# Patient Record
Sex: FEMALE | Race: ASIAN | NOT HISPANIC OR LATINO | Employment: FULL TIME | URBAN - METROPOLITAN AREA
[De-identification: names, ages, dates, MRNs, and addresses within clinical notes are randomized per-mention and may not be internally consistent; named-entity substitution may affect disease eponyms.]

---

## 2017-08-19 ENCOUNTER — HOSPITAL ENCOUNTER (EMERGENCY)
Facility: HOSPITAL | Age: 42
Discharge: HOME | End: 2017-08-19
Attending: EMERGENCY MEDICINE
Payer: COMMERCIAL

## 2017-08-19 VITALS
DIASTOLIC BLOOD PRESSURE: 66 MMHG | HEIGHT: 62 IN | BODY MASS INDEX: 19.32 KG/M2 | HEART RATE: 88 BPM | TEMPERATURE: 97 F | RESPIRATION RATE: 15 BRPM | WEIGHT: 105 LBS | OXYGEN SATURATION: 99 % | SYSTOLIC BLOOD PRESSURE: 106 MMHG

## 2017-08-19 DIAGNOSIS — R45.1 AGITATION: ICD-10-CM

## 2017-08-19 DIAGNOSIS — F10.929 ALCOHOL INTOXICATION, WITH UNSPECIFIED COMPLICATION: Primary | ICD-10-CM

## 2017-08-19 PROCEDURE — 99283 EMERGENCY DEPT VISIT LOW MDM: CPT

## 2017-08-19 RX ORDER — ROSUVASTATIN CALCIUM 10 MG/1
10 TABLET, COATED ORAL DAILY
Status: ON HOLD | COMMUNITY

## 2017-08-20 NOTE — ED PROVIDER NOTES
"Encounter Date: 8/19/2017       History     Chief Complaint   Patient presents with    Fall     EMS reports that was sitting on a bar stool and fell off hitting face on floor. has hematoma to forehead and cut to nose. pt currently has c-collar applied and on spine board      Patient is a 42-year-old female brought in by [place] [facility] after she reportedly fell off of a bar stool striking her face on the floor.  She had a brief loss of consciousness according to bystanders.  She admits to EtOH consumption tonight.  She presents to the ED belligerent and cursing at staff, refusing to give any further pertinent information.      The history is provided by the patient and the EMS personnel.     Review of patient's allergies indicates:  No Known Allergies  No past medical history on file.  No past surgical history on file.  No family history on file.  Social History   Substance Use Topics    Smoking status: Not on file    Smokeless tobacco: Not on file    Alcohol use Not on file     Review of Systems   Unable to perform ROS: Other (Patient refuses to give me any information.)       Physical Exam     Initial Vitals [08/19/17 2151]   BP Pulse Resp Temp SpO2   106/66 88 15 96.9 °F (36.1 °C) 99 %      MAP       79.33         Physical Exam    Nursing note and vitals reviewed.  Constitutional:   PATIENT NOT COOPERATIVE WITH PHYSICAL EXAM.  (PATIENT SCREAMS "DO NOT TOUCH ME".)         ED Course   Procedures  Labs Reviewed - No data to display          Medical Decision Making:   ED Management:  42-year-old female brought in from [place] after she fell from a barstool fall drinking tonight.  Patient was belligerent with EMS personnel as well as the emergency department nurses and myself.  She would not let me examine her and refused all treatment here in the ED.  She called her  to pick her up and left the ED ambulatory in stable condition.                   ED Course     Clinical Impression:   The primary " encounter diagnosis was Alcohol intoxication, with unspecified complication. A diagnosis of Agitation was also pertinent to this visit.                           Christoph Landeros MD  08/20/17 0020

## 2017-08-20 NOTE — ED NOTES
Pt presents to ED via EMS. EMS reports that pt was drinking at a bar, fell off of a barstool and hit head. Pt had hematoma to left forehead and cut to nose. Dry blood noted to nose. Pt requests to leave. Pt is unable to steadily walk.  called to  pt. Pt refuses to give any further information or have a physical assessment complete.

## 2018-03-19 ENCOUNTER — HOSPITAL ENCOUNTER (EMERGENCY)
Facility: HOSPITAL | Age: 43
Discharge: PSYCHIATRIC OR SUBSTANCE USE FACILITY | End: 2018-03-20
Attending: EMERGENCY MEDICINE
Payer: COMMERCIAL

## 2018-03-19 DIAGNOSIS — Z00.8 MEDICAL CLEARANCE FOR PSYCHIATRIC ADMISSION: ICD-10-CM

## 2018-03-19 DIAGNOSIS — R07.81 RIB PAIN ON LEFT SIDE: ICD-10-CM

## 2018-03-19 PROBLEM — F10.20 ALCOHOL USE DISORDER, SEVERE, DEPENDENCE: Status: ACTIVE | Noted: 2018-03-19

## 2018-03-19 PROBLEM — F33.2 MAJOR DEPRESSIVE DISORDER, RECURRENT SEVERE WITHOUT PSYCHOTIC FEATURES: Status: ACTIVE | Noted: 2018-03-19

## 2018-03-19 LAB
ALBUMIN SERPL BCP-MCNC: 3.8 G/DL
ALP SERPL-CCNC: 76 U/L
ALT SERPL W/O P-5'-P-CCNC: 15 U/L
AMPHET+METHAMPHET UR QL: NEGATIVE
ANION GAP SERPL CALC-SCNC: 13 MMOL/L
AST SERPL-CCNC: 36 U/L
B-HCG UR QL: NEGATIVE
BACTERIA #/AREA URNS HPF: NORMAL /HPF
BARBITURATES UR QL SCN>200 NG/ML: NEGATIVE
BASOPHILS # BLD AUTO: 0.02 K/UL
BASOPHILS NFR BLD: 0.4 %
BENZODIAZ UR QL SCN>200 NG/ML: NORMAL
BILIRUB SERPL-MCNC: 0.3 MG/DL
BILIRUB UR QL STRIP: NEGATIVE
BUN SERPL-MCNC: 13 MG/DL
BZE UR QL SCN: NEGATIVE
CALCIUM SERPL-MCNC: 9 MG/DL
CANNABINOIDS UR QL SCN: NEGATIVE
CHLORIDE SERPL-SCNC: 106 MMOL/L
CLARITY UR: CLEAR
CO2 SERPL-SCNC: 26 MMOL/L
COLOR UR: YELLOW
CREAT SERPL-MCNC: 0.7 MG/DL
CREAT UR-MCNC: 46.4 MG/DL
CTP QC/QA: YES
DIFFERENTIAL METHOD: ABNORMAL
EOSINOPHIL # BLD AUTO: 0 K/UL
EOSINOPHIL NFR BLD: 0.4 %
ERYTHROCYTE [DISTWIDTH] IN BLOOD BY AUTOMATED COUNT: 13.6 %
EST. GFR  (AFRICAN AMERICAN): >60 ML/MIN/1.73 M^2
EST. GFR  (NON AFRICAN AMERICAN): >60 ML/MIN/1.73 M^2
ETHANOL SERPL-MCNC: 120 MG/DL
ETHANOL SERPL-MCNC: 333 MG/DL
GLUCOSE SERPL-MCNC: 96 MG/DL
GLUCOSE UR QL STRIP: NEGATIVE
HCT VFR BLD AUTO: 39.3 %
HGB BLD-MCNC: 14 G/DL
HGB UR QL STRIP: ABNORMAL
KETONES UR QL STRIP: NEGATIVE
LEUKOCYTE ESTERASE UR QL STRIP: NEGATIVE
LYMPHOCYTES # BLD AUTO: 3 K/UL
LYMPHOCYTES NFR BLD: 53.3 %
MCH RBC QN AUTO: 34.1 PG
MCHC RBC AUTO-ENTMCNC: 35.6 G/DL
MCV RBC AUTO: 96 FL
METHADONE UR QL SCN>300 NG/ML: NEGATIVE
MICROSCOPIC COMMENT: NORMAL
MONOCYTES # BLD AUTO: 0.3 K/UL
MONOCYTES NFR BLD: 5.9 %
NEUTROPHILS # BLD AUTO: 2.3 K/UL
NEUTROPHILS NFR BLD: 40 %
NITRITE UR QL STRIP: NEGATIVE
OPIATES UR QL SCN: NEGATIVE
PCP UR QL SCN>25 NG/ML: NEGATIVE
PH UR STRIP: 7 [PH] (ref 5–8)
PLATELET # BLD AUTO: 225 K/UL
PMV BLD AUTO: 10 FL
POTASSIUM SERPL-SCNC: 4.1 MMOL/L
PROT SERPL-MCNC: 7.7 G/DL
PROT UR QL STRIP: NEGATIVE
RBC # BLD AUTO: 4.11 M/UL
RBC #/AREA URNS HPF: 1 /HPF (ref 0–4)
SODIUM SERPL-SCNC: 145 MMOL/L
SP GR UR STRIP: 1.01 (ref 1–1.03)
TOXICOLOGY INFORMATION: NORMAL
TSH SERPL DL<=0.005 MIU/L-ACNC: 0.54 UIU/ML
URN SPEC COLLECT METH UR: ABNORMAL
UROBILINOGEN UR STRIP-ACNC: NEGATIVE EU/DL
WBC # BLD AUTO: 5.61 K/UL

## 2018-03-19 PROCEDURE — 96376 TX/PRO/DX INJ SAME DRUG ADON: CPT

## 2018-03-19 PROCEDURE — 96374 THER/PROPH/DIAG INJ IV PUSH: CPT

## 2018-03-19 PROCEDURE — 81025 URINE PREGNANCY TEST: CPT | Performed by: EMERGENCY MEDICINE

## 2018-03-19 PROCEDURE — 90792 PSYCH DIAG EVAL W/MED SRVCS: CPT | Mod: ,,, | Performed by: PSYCHIATRY & NEUROLOGY

## 2018-03-19 PROCEDURE — 63600175 PHARM REV CODE 636 W HCPCS: Performed by: EMERGENCY MEDICINE

## 2018-03-19 PROCEDURE — 93005 ELECTROCARDIOGRAM TRACING: CPT

## 2018-03-19 PROCEDURE — 80307 DRUG TEST PRSMV CHEM ANLYZR: CPT

## 2018-03-19 PROCEDURE — 81000 URINALYSIS NONAUTO W/SCOPE: CPT | Mod: 59

## 2018-03-19 PROCEDURE — 96375 TX/PRO/DX INJ NEW DRUG ADDON: CPT

## 2018-03-19 PROCEDURE — 25000003 PHARM REV CODE 250: Performed by: EMERGENCY MEDICINE

## 2018-03-19 PROCEDURE — 99285 EMERGENCY DEPT VISIT HI MDM: CPT | Mod: 25

## 2018-03-19 PROCEDURE — 80320 DRUG SCREEN QUANTALCOHOLS: CPT

## 2018-03-19 PROCEDURE — 96361 HYDRATE IV INFUSION ADD-ON: CPT

## 2018-03-19 PROCEDURE — 84443 ASSAY THYROID STIM HORMONE: CPT

## 2018-03-19 PROCEDURE — 82962 GLUCOSE BLOOD TEST: CPT

## 2018-03-19 PROCEDURE — 93010 ELECTROCARDIOGRAM REPORT: CPT | Mod: ,,, | Performed by: INTERNAL MEDICINE

## 2018-03-19 PROCEDURE — 85025 COMPLETE CBC W/AUTO DIFF WBC: CPT

## 2018-03-19 PROCEDURE — 80053 COMPREHEN METABOLIC PANEL: CPT

## 2018-03-19 RX ORDER — CITALOPRAM 20 MG/1
40 TABLET, FILM COATED ORAL DAILY
Status: DISCONTINUED | OUTPATIENT
Start: 2018-03-19 | End: 2018-03-20 | Stop reason: HOSPADM

## 2018-03-19 RX ORDER — ROSUVASTATIN CALCIUM 10 MG/1
10 TABLET, COATED ORAL NIGHTLY
Status: DISCONTINUED | OUTPATIENT
Start: 2018-03-19 | End: 2018-03-20 | Stop reason: HOSPADM

## 2018-03-19 RX ORDER — ONDANSETRON 2 MG/ML
8 INJECTION INTRAMUSCULAR; INTRAVENOUS
Status: COMPLETED | OUTPATIENT
Start: 2018-03-19 | End: 2018-03-19

## 2018-03-19 RX ORDER — DIAZEPAM 5 MG/1
10 TABLET ORAL
Status: COMPLETED | OUTPATIENT
Start: 2018-03-19 | End: 2018-03-19

## 2018-03-19 RX ORDER — KETOROLAC TROMETHAMINE 30 MG/ML
10 INJECTION, SOLUTION INTRAMUSCULAR; INTRAVENOUS
Status: COMPLETED | OUTPATIENT
Start: 2018-03-19 | End: 2018-03-19

## 2018-03-19 RX ORDER — LORAZEPAM 2 MG/ML
2 INJECTION INTRAMUSCULAR EVERY 4 HOURS PRN
Status: DISCONTINUED | OUTPATIENT
Start: 2018-03-19 | End: 2018-03-20 | Stop reason: HOSPADM

## 2018-03-19 RX ORDER — DIAZEPAM 10 MG/1
10 TABLET ORAL
COMMUNITY
End: 2018-05-17

## 2018-03-19 RX ORDER — MORPHINE SULFATE 4 MG/ML
4 INJECTION, SOLUTION INTRAMUSCULAR; INTRAVENOUS
Status: COMPLETED | OUTPATIENT
Start: 2018-03-19 | End: 2018-03-19

## 2018-03-19 RX ORDER — CITALOPRAM 40 MG/1
40 TABLET, FILM COATED ORAL DAILY
COMMUNITY
End: 2018-05-01

## 2018-03-19 RX ADMIN — KETOROLAC TROMETHAMINE 10 MG: 30 INJECTION, SOLUTION INTRAMUSCULAR at 09:03

## 2018-03-19 RX ADMIN — ROSUVASTATIN CALCIUM 10 MG: 10 TABLET, FILM COATED ORAL at 09:03

## 2018-03-19 RX ADMIN — DIAZEPAM 10 MG: 5 TABLET ORAL at 07:03

## 2018-03-19 RX ADMIN — CITALOPRAM HYDROBROMIDE 40 MG: 20 TABLET ORAL at 09:03

## 2018-03-19 RX ADMIN — ONDANSETRON HYDROCHLORIDE 8 MG: 2 INJECTION INTRAMUSCULAR; INTRAVENOUS at 10:03

## 2018-03-19 RX ADMIN — SODIUM CHLORIDE 1000 ML: 0.9 INJECTION, SOLUTION INTRAVENOUS at 09:03

## 2018-03-19 RX ADMIN — LORAZEPAM 2 MG: 2 INJECTION INTRAMUSCULAR; INTRAVENOUS at 07:03

## 2018-03-19 RX ADMIN — SODIUM CHLORIDE 1000 ML: 0.9 INJECTION, SOLUTION INTRAVENOUS at 07:03

## 2018-03-19 RX ADMIN — MORPHINE SULFATE 4 MG: 4 INJECTION INTRAVENOUS at 10:03

## 2018-03-19 NOTE — ASSESSMENT & PLAN NOTE
Patient has a severe alcohol problem with some insight.  A lot of time is spent with education and counseling to help patient but she is currently intoxicated.  Monitor for alcohol withdrawals and utilize lorazepam PRN.  No history of withdrawals.  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  She has a poor diet because of drinking.  May also benefit from replenishing MVI, thiamine, folic acid.

## 2018-03-19 NOTE — ED PROVIDER NOTES
"Encounter Date: 3/19/2018       History     Chief Complaint   Patient presents with    Psychiatric Evaluation     via ems pt intoxicated admitted to drinking "whole bottle of fireball" , Pt's  called ems because she was threatening to kill herself, had not been taking her meds for depression     42-year-old female with a history of hyperlipidemia as well as depression presents the emergency department by EMS.  Patient states that she has been drinking excessively since the death of her mother last year.  Her  called EMS as the patient was threatening to kill herself with alcohol intoxication.  Patient states she feels "like no one cares about me".  She denies past suicide attempts.  She denies homicidal ideation.  She states that she does not feel unsafe at home.  She admits to drinking over a bottle of Fireball every day.  She has been missing work frequently.  She is unsure when she last shower or ate a meal.          Review of patient's allergies indicates:  No Known Allergies  No past medical history on file.  No past surgical history on file.  No family history on file.  Social History   Substance Use Topics    Smoking status: Not on file    Smokeless tobacco: Not on file    Alcohol use Not on file     Review of Systems   Constitutional: Negative for activity change, appetite change, chills and fever.   HENT: Negative for congestion and drooling.    Eyes: Negative for pain.   Respiratory: Negative for cough, chest tightness, shortness of breath and stridor.    Cardiovascular: Negative for leg swelling.   Gastrointestinal: Negative for abdominal pain, nausea and vomiting.   Genitourinary: Negative for dysuria.   Musculoskeletal: Negative for neck pain and neck stiffness.   Skin: Negative for color change and wound.   Neurological: Negative for dizziness and weakness.   Hematological: Negative for adenopathy.   Psychiatric/Behavioral: Positive for behavioral problems, sleep disturbance and " suicidal ideas. Negative for agitation, confusion, decreased concentration, dysphoric mood, hallucinations and self-injury. The patient is not nervous/anxious.        Physical Exam     Initial Vitals [18 1420]   BP Pulse Resp Temp SpO2   114/62 72 18 98.7 °F (37.1 °C) 98 %      MAP       79.33         Physical Exam    Nursing note and vitals reviewed.  Constitutional: She appears well-developed and well-nourished. She is not diaphoretic. No distress.   HENT:   Head: Normocephalic and atraumatic.   Right Ear: External ear normal.   Left Ear: External ear normal.   Nose: Nose normal.   Mouth/Throat: Oropharynx is clear and moist.   Eyes: Conjunctivae and EOM are normal. Pupils are equal, round, and reactive to light. Right eye exhibits no discharge. Left eye exhibits no discharge.   Neck: Normal range of motion. Neck supple. No tracheal deviation present.   Cardiovascular: Normal rate, regular rhythm, normal heart sounds and intact distal pulses. Exam reveals no friction rub.    No murmur heard.  Pulmonary/Chest: Breath sounds normal. No respiratory distress. She has no rales. She exhibits no tenderness.   Abdominal: Soft. Bowel sounds are normal. She exhibits no distension and no mass. There is no tenderness. There is no rebound.   Musculoskeletal: Normal range of motion. She exhibits no edema or tenderness.   Neurological: She is alert and oriented to person, place, and time. She has normal strength. No cranial nerve deficit.   Skin: Skin is warm and dry. Capillary refill takes less than 2 seconds.   Psychiatric:   Tearful.  Depressed.  Expresses passive suicidality         ED Course   Procedures  Labs Reviewed   CBC W/ AUTO DIFFERENTIAL   COMPREHENSIVE METABOLIC PANEL   TSH   URINALYSIS   ALCOHOL,MEDICAL (ETHANOL)   DRUG SCREEN PANEL, URINE EMERGENCY   POCT URINE PREGNANCY             Medical Decision Makin-year-old female with a history of depression as well as hyperlipidemia presents the emergency  department brought in by EMS secondary to depression and suicidal thoughts.  On exam she is afebrile with stable vital signs.  She is nontoxic appearing.  She does appear depressed, tearful and expresses passive suicidal thoughts.  She admits drinking alcohol daily for months to deal with the death of her mother.  At this time, the patient is suicidal as well as gravely disabled.  Will PEC and seek inpatient psychiatric mental health care.  Screening labs are pending at this time.  The patient is aware of the PEC agrees with the plan.     Dr. Caro, psychiatrist, at bedside and has evaluated the patient. Agrees with PEC. PRN benzos for agitation.     Patient and her  were counseled were instructed to contact [place] [employer] to report her substance abuse. As a treating physician, I am not under mandate to report the patient but have urged her to self-report.     Given home medications of valium, celexa, statin as well as IVF. CBC and CMP are unremarkable. Ethanol is elevated. Urine studies are pending. Once medically clear, will seek inpatient hospitalizaton. She is c/o pain to R upper arm where she had blood drawn. On exam there is mild erythema to R biceps without warmth, induration, fluctuance or drainage. Compartments are soft. There is no bleeding. I suspect this is related to the tourniquet that was recently placed and blood drawn.    No signs of alcohol withdrawal at this time. Ativan PRN for agitation and seizures. Patient calm and cooperative at this time.     Patient reassessed and c/o atraumatic L flank pain and L lower abdominal pain for undetermined amount of time. Denies n/v/d/constipation/urinary symptoms, rash, cough, fall. On exam she has mild L CVA tenderness. Abd soft NTND +BS no r/g. No midline ttp/step off or deformity to neck or back. Will give morphine, zofran for pain control. Obtain CT to r/o nephrolithiasis or diverticulitis. I doubt acute appendicitis,  colitis, perforated viscous, sepsis, epidural abscess at this time. Care turned over to Dr. Asencio at shift change.  Hoda Wagoner M.D.  10:37 PM 3/19/2018                        Clinical Impression:   The encounter diagnosis was Medical clearance for psychiatric admission.                           Hoda Wagoner MD  03/19/18 2133       Hoda Wagoner MD  03/19/18 3921

## 2018-03-19 NOTE — ED TRIAGE NOTES
42 y.o female presents to the ED via EMS for psychiatric evaluation. Per EMS her  called to report that the patient had been drinking today and reported SI. The patient denies SI/HI but reports she has been drinking today. The patient reports drinking daily since her mom passed away last year. She denies any medical hx or use of daily medications. There is a sitter at the bedside for 1:1 observation.

## 2018-03-19 NOTE — CONSULTS
"[facility]  Psychiatry  Consult Note    Patient Name: Brooke Schwartz  MRN: [id number]   Code Status: No Order  Admission Date: 3/19/2018  Hospital Length of Stay: 0 days  Attending Physician: Hoda Wagoner MD  Primary Care Provider: Lefty Solis MD    Current Legal Status: [facility]    Patient information was obtained from patient, spouse/SO and ER records.   Inpatient consult to Psychiatry  Consult performed by: MORENITA GONZALEZ  Consult ordered by: HODA WAGONER        Subjective:     Principal Problem:<principal problem not specified>    Chief Complaint:  Depression, alcohol use     HPI: Patient Brooke Schwartz was brought to the hospital by her  Turner after he has had concerns about her self care and alcohol use.  She asks that he leave the room so that we can speak privately at first.  Of note, she is clinically intoxicated and her alcohol level is 333mg/dL.  She is a limited historian but able to get most information.  She tells me that she has been very depressed for years since the death of her father and then her mother  last year.  She says that she starting to increase her drinking around that time and has been drinking a half pint to a pint of Fireball whiskey or vodka daily.  She denies any DWI or withdrawal history but reluctantly admits that her drinking is affecting her life.  She is skipping work and has poor hygiene.  She is not sure of the last time she has showered or bathed.  She has no energy, no motivation, loss of interest in things, and losing weight.  She flores not eat much.  Says that she sleeps more than usual.  Tearful "every day", including during our interview.  Denies any active suicidal thoughts but admits that she is drinking to "harm" herself.  "I am causing self harm because I am depressed."  Admits to being an anxious person and worries about things that she should not worry about.  Denies any panic attacks, manic history, or psychosis.  Denies " access to guns at home.    She admits to being depressed in her 20s and getting started on Celexa which helped.  She had stopped it for years but was recently prescribed this again and feeling that it is not helping.  Says that she wants help for her drinking and her depression.  When educated about psychiatric hospitalization, she is not wanting at first but with education and counseling, she is agreeable.    She asks that  come back for discussion.  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  He states that he will assist her with this after she hoa up and completes psychiatric hospitalization.    Chart review shows that she was seen in the ED in 08/2017 for falling off a stool while intoxicated and landing on her face.    Hospital Course: No notes on file         Patient History           Medical as of 3/19/2018     Past Medical History     Diagnosis Date Comments Source    Hx of psychiatric care -- Celexa in her 20s for depression Provider    Psychiatric problem -- -- Provider    Therapy -- -- Provider          Pertinent Negatives     Diagnosis Date Noted Comments Source    History of psychiatric hospitalization 3/19/2018 -- Provider    Suicide attempt 3/19/2018 -- Provider                  Surgical as of 3/19/2018    Past Surgical History: Patient provided no pertinent surgical history.           Family as of 3/19/2018    **None**           Tobacco Use as of 3/19/2018     Smoking Status Smoking Start Date Smoking Quit Date Packs/day Years Used    Never Smoker -- -- -- --    Types Comments Smokeless Tobacco Status Smokeless Tobacco Quit Date Source    -- -- Never Used -- Provider            Alcohol Use as of 3/19/2018     Alcohol Use Drinks/Week Alcohol/Week Comments Source    Yes -- -- drinking half pint to a pint of Fireball whiskey or vodka daily  "Provider            Drug Use as of 3/19/2018     Drug Use Types Frequency Comments Source    No -- -- -- Provider            Sexual Activity as of 3/19/2018     Sexually Active Birth Control Partners Comments Source    -- -- -- -- Provider            Activities of Daily Living as of 3/19/2018     Activities of Daily Living Question Response Comments Source    Patient feels they ought to cut down on drinking/drug use Not Asked -- Provider    Patient annoyed by others criticizing their drinking/drug use Not Asked -- Provider    Patient has felt bad or guilty about drinking/drug use Not Asked -- Provider    Patient has had a drink/used drugs as an eye opener in the AM Not Asked -- Provider            Social Documentation as of 3/19/2018    **None**           Occupational as of 3/19/2018    **None**           Socioeconomic as of 3/19/2018     Marital Status Spouse Name Number of Children Years Education Preferred Language Ethnicity Race Source     -- 0 -- English /Frisian  Provider         Pertinent History Q A Comments    as of 3/19/2018 Lives with spouse     Place in Birth Order      Lives in home     Number of Siblings      Raised by biological parents     Legal Involvement none     Childhood Trauma      Criminal History of none     Financial Status employed ED physician at the [employer]    Highest Level of Education Master's, PhD DO    Does patient have access to a firearm? No      Service No     Primary Leisure Activity other "work"    Spirituality non-practicing      Past Medical History:   Diagnosis Date    Hx of psychiatric care     Selena in her 20s for depression    Psychiatric problem     Therapy      No past surgical history on file.  Family History     None        Social History Main Topics    Smoking status: Never Smoker    Smokeless tobacco: Never Used    Alcohol use Yes      Comment: drinking half pint to a pint of Fireball whiskey or vodka daily    Drug use: No    " "Sexual activity: Not on file     Review of patient's allergies indicates:  No Known Allergies    No current facility-administered medications on file prior to encounter.      Current Outpatient Prescriptions on File Prior to Encounter   Medication Sig    rosuvastatin (CRESTOR) 10 MG tablet Take 10 mg by mouth once daily.     Psychotherapeutics     None        Review of Systems   Constitutional: Positive for activity change, appetite change, fatigue and unexpected weight change.   Respiratory: Negative for shortness of breath.    Cardiovascular: Negative for chest pain.   Musculoskeletal: Negative for myalgias.   Psychiatric/Behavioral: Positive for dysphoric mood. Negative for sleep disturbance and suicidal ideas. The patient is nervous/anxious.      Strengths and Liabilities: Strength: Patient is intelligent., Liability: Patient has poor judgment, Liability: Patient lacks coping skills.    Objective:     Vital Signs (Most Recent):  Temp: 98.7 °F (37.1 °C) (03/19/18 1420)  Pulse: 72 (03/19/18 1420)  Resp: 18 (03/19/18 1420)  BP: 114/62 (03/19/18 1420)  SpO2: 98 % (03/19/18 1420) Vital Signs (24h Range):  Temp:  [98.7 °F (37.1 °C)] 98.7 °F (37.1 °C)  Pulse:  [72] 72  Resp:  [18] 18  SpO2:  [98 %] 98 %  BP: (114)/(62) 114/62           There is no height or weight on file to calculate BMI.    No intake or output data in the 24 hours ending 03/19/18 1748    Physical Exam   Psychiatric:   EXAMINATION    CONSTITUTIONAL  General Appearance: blue scrubs, sitting in bed    MUSCULOSKELETAL  Muscle Strength and Tone: normal  Abnormal Involuntary Movements: none noted  Gait and Station: not observed    PSYCHIATRIC MENTAL STATUS EXAM   Level of Consciousness: awake and alert  Orientation: name, place, date, situation  Grooming: poor hygiene, dirty nails and hair, slightly malodorous  Psychomotor Behavior: normal  Speech: slurred, normal volume  Language: no abnormalities   Mood: "sad"  Affect: blunted and intoxicated  Thought " Process: linear  Associations: intact  Thought Content: denies suicidal/homicidal/psychosis  Memory: intact to recent and remote  Attention: decreased  Fund of Knowledge: intact for conversation  Insight: poor into alcohol use but fair into depression  Judgment: limited towards help for alcohol use and depression         Significant Labs:   Last 24 Hours:   Recent Lab Results       03/19/18  1520      Albumin 3.8     Alcohol, Medical, Serum 333  Comment:  MEDICAL ALCOHOL critical result(s) called and verbal readback   obtained from DAYANARA NICOLE. , 03/19/2018 16:20  (HH)     Alkaline Phosphatase 76     ALT 15     Anion Gap 13     AST 36     Baso # 0.02     Basophil% 0.4     Total Bilirubin 0.3  Comment:  For infants and newborns, interpretation of results should be based  on gestational age, weight and in agreement with clinical  observations.  Premature Infant recommended reference ranges:  Up to 24 hours.............<8.0 mg/dL  Up to 48 hours............<12.0 mg/dL  3-5 days..................<15.0 mg/dL  6-29 days.................<15.0 mg/dL       BUN, Bld 13     Calcium 9.0     Chloride 106     CO2 26     Creatinine 0.7     Differential Method Automated     eGFR if  >60     eGFR if non  >60  Comment:  Calculation used to obtain the estimated glomerular filtration  rate (eGFR) is the CKD-EPI equation.        Eos # 0.0     Eosinophil% 0.4     Glucose 96     Gran # (ANC) 2.3     Gran% 40.0     Hematocrit 39.3     Hemoglobin 14.0     Lymph # 3.0     Lymph% 53.3(H)     MCH 34.1(H)     MCHC 35.6     MCV 96     Mono # 0.3     Mono% 5.9     MPV 10.0     Platelets 225     Potassium 4.1     Total Protein 7.7     RBC 4.11     RDW 13.6     Sodium 145     TSH 0.539     WBC 5.61         All pertinent labs within the past 24 hours have been reviewed.    Significant Imaging: I have reviewed all pertinent imaging results/findings within the past 24 hours.    Assessment/Plan:     Major depressive  disorder, recurrent severe without psychotic features    Patient has a long history of depression dating back to her 20s.  Because of recent stressors of loss of parents, patient's depression has worsened significantly.  She is currently gravely disabled and self medicating with the alcohol.  Agree with PEC and 1:1 sitter for grave disability.  Notify  of commitment process and seek acute inpatient psychiatric treatment for further monitoring and care.  Once sober, reassessment of her condition is warranted.  Likely that she can be maximized on her dose of citalopram and augmented with bupropion.  She will need to get established with outpatient care with a psychiatrist and therapist.        Alcohol use disorder, severe, dependence    Patient has a severe alcohol problem with some insight.  A lot of time is spent with education and counseling to help patient but she is currently intoxicated.  Monitor for alcohol withdrawals and utilize lorazepam PRN.  No history of withdrawals.  As a treating physician, I am not a mandatory  but patient and her  are educated that it would be in her best interest to notify the Healthcare Clinician's [facility] in [place] to get some help.  She has a poor diet because of drinking.  May also benefit from replenishing MVI, thiamine, folic acid.             Total Time:  60 minutes      Renato Davenport MD   Psychiatry  [facility] [place], [place]

## 2018-03-19 NOTE — ASSESSMENT & PLAN NOTE
Patient has a long history of depression dating back to her 20s.  Because of recent stressors of loss of parents, patient's depression has worsened significantly.  She is currently gravely disabled and self medicating with the alcohol.  Agree with PEC and 1:1 sitter for grave disability.  Notify  of commitment process and seek acute inpatient psychiatric treatment for further monitoring and care.  Once sober, reassessment of her condition is warranted.  Likely that she can be maximized on her dose of citalopram and augmented with bupropion.  She will need to get established with outpatient care with a psychiatrist and therapist.

## 2018-03-19 NOTE — HPI
"Patient Brooke Schwartz was brought to the hospital by her  Turner after he has had concerns about her self care and alcohol use.  She asks that he leave the room so that we can speak privately at first.  Of note, she is clinically intoxicated and her alcohol level is 333mg/dL.  She is a limited historian but able to get most information.  She tells me that she has been very depressed for years since the death of her father and then her mother  last year.  She says that she starting to increase her drinking around that time and has been drinking a half pint to a pint of Fireball whiskey or vodka daily.  She denies any DWI or withdrawal history but reluctantly admits that her drinking is affecting her life.  She is skipping work and has poor hygiene.  She is not sure of the last time she has showered or bathed.  She has no energy, no motivation, loss of interest in things, and losing weight.  She flores not eat much.  Says that she sleeps more than usual.  Tearful "every day", including during our interview.  Denies any active suicidal thoughts but admits that she is drinking to "harm" herself.  "I am causing self harm because I am depressed."  Admits to being an anxious person and worries about things that she should not worry about.  Denies any panic attacks, manic history, or psychosis.  Denies access to guns at home.    She admits to being depressed in her 20s and getting started on Celexa which helped.  She had stopped it for years but was recently prescribed this again and feeling that it is not helping.  Says that she wants help for her drinking and her depression.  When educated about psychiatric hospitalization, she is not wanting at first but with education and counseling, she is agreeable.    She asks that  come back for discussion.  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  He states that he will assist her with this after she hoa up and completes psychiatric hospitalization.    Chart review shows that she was seen in the ED in 08/2017 for falling off a stool while intoxicated and landing on her face.

## 2018-03-19 NOTE — SUBJECTIVE & OBJECTIVE
Patient History           Medical as of 3/19/2018     Past Medical History     Diagnosis Date Comments Source    Hx of psychiatric care -- Celexa in her 20s for depression Provider    Psychiatric problem -- -- Provider    Therapy -- -- Provider          Pertinent Negatives     Diagnosis Date Noted Comments Source    History of psychiatric hospitalization 3/19/2018 -- Provider    Suicide attempt 3/19/2018 -- Provider                  Surgical as of 3/19/2018    Past Surgical History: Patient provided no pertinent surgical history.           Family as of 3/19/2018    **None**           Tobacco Use as of 3/19/2018     Smoking Status Smoking Start Date Smoking Quit Date Packs/day Years Used    Never Smoker -- -- -- --    Types Comments Smokeless Tobacco Status Smokeless Tobacco Quit Date Source    -- -- Never Used -- Provider            Alcohol Use as of 3/19/2018     Alcohol Use Drinks/Week Alcohol/Week Comments Source    Yes -- -- drinking half pint to a pint of Fireball whiskey or vodka daily Provider            Drug Use as of 3/19/2018     Drug Use Types Frequency Comments Source    No -- -- -- Provider            Sexual Activity as of 3/19/2018     Sexually Active Birth Control Partners Comments Source    -- -- -- -- Provider            Activities of Daily Living as of 3/19/2018     Activities of Daily Living Question Response Comments Source    Patient feels they ought to cut down on drinking/drug use Not Asked -- Provider    Patient annoyed by others criticizing their drinking/drug use Not Asked -- Provider    Patient has felt bad or guilty about drinking/drug use Not Asked -- Provider    Patient has had a drink/used drugs as an eye opener in the AM Not Asked -- Provider            Social Documentation as of 3/19/2018    **None**           Occupational as of 3/19/2018    **None**           Socioeconomic as of 3/19/2018     Marital Status Spouse Name Number of Children Years Education Preferred Language  "Ethnicity Race Source     -- 0 -- English /Slovenian  Provider         Pertinent History Q A Comments    as of 3/19/2018 Lives with spouse     Place in Birth Order      Lives in home     Number of Siblings      Raised by biological parents     Legal Involvement none     Childhood Trauma      Criminal History of none     Financial Status employed ED physician at the [employer]    Highest Level of Education Master's, PhD DO    Does patient have access to a firearm? No      Service No     Primary Leisure Activity other "work"    Spirituality non-practicing      Past Medical History:   Diagnosis Date    Hx of psychiatric care     Selena in her 20s for depression    Psychiatric problem     Therapy      No past surgical history on file.  Family History     None        Social History Main Topics    Smoking status: Never Smoker    Smokeless tobacco: Never Used    Alcohol use Yes      Comment: drinking half pint to a pint of Fireball whiskey or vodka daily    Drug use: No    Sexual activity: Not on file     Review of patient's allergies indicates:  No Known Allergies    No current facility-administered medications on file prior to encounter.      Current Outpatient Prescriptions on File Prior to Encounter   Medication Sig    rosuvastatin (CRESTOR) 10 MG tablet Take 10 mg by mouth once daily.     Psychotherapeutics     None        Review of Systems   Constitutional: Positive for activity change, appetite change, fatigue and unexpected weight change.   Respiratory: Negative for shortness of breath.    Cardiovascular: Negative for chest pain.   Musculoskeletal: Negative for myalgias.   Psychiatric/Behavioral: Positive for dysphoric mood. Negative for sleep disturbance and suicidal ideas. The patient is nervous/anxious.      Strengths and Liabilities: Strength: Patient is intelligent., Liability: Patient has poor judgment, Liability: Patient lacks coping skills.    Objective:     Vital Signs (Most " "Recent):  Temp: 98.7 °F (37.1 °C) (03/19/18 1420)  Pulse: 72 (03/19/18 1420)  Resp: 18 (03/19/18 1420)  BP: 114/62 (03/19/18 1420)  SpO2: 98 % (03/19/18 1420) Vital Signs (24h Range):  Temp:  [98.7 °F (37.1 °C)] 98.7 °F (37.1 °C)  Pulse:  [72] 72  Resp:  [18] 18  SpO2:  [98 %] 98 %  BP: (114)/(62) 114/62           There is no height or weight on file to calculate BMI.    No intake or output data in the 24 hours ending 03/19/18 1748    Physical Exam   Psychiatric:   EXAMINATION    CONSTITUTIONAL  General Appearance: blue scrubs, sitting in bed    MUSCULOSKELETAL  Muscle Strength and Tone: normal  Abnormal Involuntary Movements: none noted  Gait and Station: not observed    PSYCHIATRIC MENTAL STATUS EXAM   Level of Consciousness: awake and alert  Orientation: name, place, date, situation  Grooming: poor hygiene, dirty nails and hair, slightly malodorous  Psychomotor Behavior: normal  Speech: slurred, normal volume  Language: no abnormalities   Mood: "sad"  Affect: blunted and intoxicated  Thought Process: linear  Associations: intact  Thought Content: denies suicidal/homicidal/psychosis  Memory: intact to recent and remote  Attention: decreased  Fund of Knowledge: intact for conversation  Insight: poor into alcohol use but fair into depression  Judgment: limited towards help for alcohol use and depression         Significant Labs:   Last 24 Hours:   Recent Lab Results       03/19/18  1520      Albumin 3.8     Alcohol, Medical, Serum 333  Comment:  MEDICAL ALCOHOL critical result(s) called and verbal readback   obtained from DAYANARA NICOLE. , 03/19/2018 16:20  (HH)     Alkaline Phosphatase 76     ALT 15     Anion Gap 13     AST 36     Baso # 0.02     Basophil% 0.4     Total Bilirubin 0.3  Comment:  For infants and newborns, interpretation of results should be based  on gestational age, weight and in agreement with clinical  observations.  Premature Infant recommended reference ranges:  Up to 24 hours.............<8.0 " mg/dL  Up to 48 hours............<12.0 mg/dL  3-5 days..................<15.0 mg/dL  6-29 days.................<15.0 mg/dL       BUN, Bld 13     Calcium 9.0     Chloride 106     CO2 26     Creatinine 0.7     Differential Method Automated     eGFR if  >60     eGFR if non  >60  Comment:  Calculation used to obtain the estimated glomerular filtration  rate (eGFR) is the CKD-EPI equation.        Eos # 0.0     Eosinophil% 0.4     Glucose 96     Gran # (ANC) 2.3     Gran% 40.0     Hematocrit 39.3     Hemoglobin 14.0     Lymph # 3.0     Lymph% 53.3(H)     MCH 34.1(H)     MCHC 35.6     MCV 96     Mono # 0.3     Mono% 5.9     MPV 10.0     Platelets 225     Potassium 4.1     Total Protein 7.7     RBC 4.11     RDW 13.6     Sodium 145     TSH 0.539     WBC 5.61         All pertinent labs within the past 24 hours have been reviewed.    Significant Imaging: I have reviewed all pertinent imaging results/findings within the past 24 hours.

## 2018-03-20 VITALS
OXYGEN SATURATION: 97 % | SYSTOLIC BLOOD PRESSURE: 103 MMHG | HEART RATE: 71 BPM | DIASTOLIC BLOOD PRESSURE: 65 MMHG | TEMPERATURE: 99 F | RESPIRATION RATE: 18 BRPM

## 2018-03-20 LAB
ETHANOL SERPL-MCNC: 23 MG/DL
LIPASE SERPL-CCNC: 102 U/L
POCT GLUCOSE: 82 MG/DL (ref 70–110)

## 2018-03-20 PROCEDURE — S0028 INJECTION, FAMOTIDINE, 20 MG: HCPCS | Performed by: EMERGENCY MEDICINE

## 2018-03-20 PROCEDURE — 25000003 PHARM REV CODE 250: Performed by: EMERGENCY MEDICINE

## 2018-03-20 PROCEDURE — 63600175 PHARM REV CODE 636 W HCPCS: Performed by: EMERGENCY MEDICINE

## 2018-03-20 PROCEDURE — 83690 ASSAY OF LIPASE: CPT

## 2018-03-20 PROCEDURE — 80320 DRUG SCREEN QUANTALCOHOLS: CPT

## 2018-03-20 RX ORDER — DIAZEPAM 5 MG/1
10 TABLET ORAL
Status: COMPLETED | OUTPATIENT
Start: 2018-03-20 | End: 2018-03-20

## 2018-03-20 RX ORDER — OXYCODONE AND ACETAMINOPHEN 5; 325 MG/1; MG/1
1 TABLET ORAL
Status: COMPLETED | OUTPATIENT
Start: 2018-03-20 | End: 2018-03-20

## 2018-03-20 RX ORDER — FAMOTIDINE 10 MG/ML
20 INJECTION INTRAVENOUS
Status: COMPLETED | OUTPATIENT
Start: 2018-03-20 | End: 2018-03-20

## 2018-03-20 RX ORDER — MORPHINE SULFATE 4 MG/ML
4 INJECTION, SOLUTION INTRAMUSCULAR; INTRAVENOUS
Status: COMPLETED | OUTPATIENT
Start: 2018-03-20 | End: 2018-03-20

## 2018-03-20 RX ADMIN — FAMOTIDINE 20 MG: 10 INJECTION, SOLUTION INTRAVENOUS at 08:03

## 2018-03-20 RX ADMIN — OXYCODONE HYDROCHLORIDE AND ACETAMINOPHEN 1 TABLET: 5; 325 TABLET ORAL at 04:03

## 2018-03-20 RX ADMIN — LIDOCAINE HYDROCHLORIDE: 20 SOLUTION ORAL; TOPICAL at 08:03

## 2018-03-20 RX ADMIN — LIDOCAINE HYDROCHLORIDE: 20 SOLUTION ORAL; TOPICAL at 10:03

## 2018-03-20 RX ADMIN — MORPHINE SULFATE 4 MG: 4 INJECTION INTRAVENOUS at 02:03

## 2018-03-20 RX ADMIN — SODIUM CHLORIDE, PRESERVATIVE FREE 1000 ML: 5 INJECTION INTRAVENOUS at 02:03

## 2018-03-20 RX ADMIN — DIAZEPAM 10 MG: 5 TABLET ORAL at 08:03

## 2018-03-20 RX ADMIN — OXYCODONE HYDROCHLORIDE AND ACETAMINOPHEN 1 TABLET: 5; 325 TABLET ORAL at 08:03

## 2018-03-26 ENCOUNTER — DOCUMENTATION ONLY (OUTPATIENT)
Dept: PSYCHIATRY | Facility: HOSPITAL | Age: 43
End: 2018-03-26

## 2018-03-26 NOTE — PSYCH
SW reviewed benefits and program details for ABU program w pt. Pt expressed understanding and scheduled to start ABU for 3/28/2018

## 2018-03-28 ENCOUNTER — HOSPITAL ENCOUNTER (OUTPATIENT)
Dept: PSYCHIATRY | Facility: HOSPITAL | Age: 43
Discharge: HOME | End: 2018-03-28
Attending: PSYCHIATRY & NEUROLOGY

## 2018-03-28 NOTE — PSYCH
Pt called ABU program at 11:55am reporting that she overslept. Pt requested to reschedule ABU start date.

## 2018-03-28 NOTE — PSYCH
MISTY contacted pt x 2 regarding absence from appt for 8:30am. No answer. Sw left message. On second call, pt's mail box was full.

## 2018-04-05 ENCOUNTER — HOSPITAL ENCOUNTER (OUTPATIENT)
Dept: PSYCHIATRY | Facility: HOSPITAL | Age: 43
Discharge: HOME | End: 2018-04-05
Attending: PSYCHIATRY & NEUROLOGY
Payer: COMMERCIAL

## 2018-04-05 VITALS
TEMPERATURE: 99 F | DIASTOLIC BLOOD PRESSURE: 71 MMHG | HEART RATE: 80 BPM | SYSTOLIC BLOOD PRESSURE: 96 MMHG | WEIGHT: 106.25 LBS | BODY MASS INDEX: 19.55 KG/M2 | RESPIRATION RATE: 16 BRPM | HEIGHT: 62 IN

## 2018-04-05 DIAGNOSIS — F33.2 MAJOR DEPRESSIVE DISORDER, RECURRENT SEVERE WITHOUT PSYCHOTIC FEATURES: ICD-10-CM

## 2018-04-05 DIAGNOSIS — F10.20 ALCOHOL USE DISORDER, SEVERE, DEPENDENCE: Primary | ICD-10-CM

## 2018-04-05 LAB
AMPHET+METHAMPHET UR QL: NEGATIVE
B-HCG UR QL: NEGATIVE
BARBITURATES UR QL SCN>200 NG/ML: NEGATIVE
BENZODIAZ UR QL SCN>200 NG/ML: NORMAL
BREATH ALCOHOL: 0
BZE UR QL SCN: NEGATIVE
CANNABINOIDS UR QL SCN: NEGATIVE
CREAT UR-MCNC: 39 MG/DL
ETHANOL UR-MCNC: <10 MG/DL
METHADONE UR QL SCN>300 NG/ML: NEGATIVE
OPIATES UR QL SCN: NEGATIVE
PCP UR QL SCN>25 NG/ML: NEGATIVE
TOXICOLOGY INFORMATION: NORMAL

## 2018-04-05 PROCEDURE — 99222 1ST HOSP IP/OBS MODERATE 55: CPT | Mod: ,,, | Performed by: PSYCHIATRY & NEUROLOGY

## 2018-04-05 PROCEDURE — 90853 GROUP PSYCHOTHERAPY: CPT | Mod: ,,, | Performed by: PSYCHOLOGIST

## 2018-04-05 PROCEDURE — 90853 GROUP PSYCHOTHERAPY: CPT

## 2018-04-05 PROCEDURE — 81025 URINE PREGNANCY TEST: CPT

## 2018-04-05 PROCEDURE — 80307 DRUG TEST PRSMV CHEM ANLYZR: CPT

## 2018-04-05 RX ORDER — VENLAFAXINE HYDROCHLORIDE 37.5 MG/1
37.5 CAPSULE, EXTENDED RELEASE ORAL DAILY
Qty: 30 CAPSULE | Refills: 0 | Status: SHIPPED | OUTPATIENT
Start: 2018-04-05 | End: 2018-05-01

## 2018-04-05 RX ORDER — GABAPENTIN 300 MG/1
300 CAPSULE ORAL 3 TIMES DAILY
Qty: 90 CAPSULE | Refills: 0 | Status: SHIPPED | OUTPATIENT
Start: 2018-04-05 | End: 2018-05-01

## 2018-04-05 NOTE — PROGRESS NOTES
"Group Psychotherapy (PhD/LCSW)    Site: [facility]    Clinical status of patient: Intensive Outpatient Program (IOP)    Date: 4/5/2018    Group Focus: Strength Training      Length of service: 89274 - 45-50 minutes    Number of patients in attendance: 12    Referred by: Addictive Behavior Unit Treatment Team    Target symptoms: Alcohol Abuse and Depression    Patient's response to treatment: Active Listening    Progress toward goals: Progressing slowly    Interval History: Discussed the "negativity bias of the brain" and strategies for counteracting the same by cultivating awareness of positive experiences, tuning into the accompanying affect, and daily rituals of awareness (eg gratitude lists).     Diagnosis: Alcohol use disorder, severe, dependence    Plan: Continue treatment on ABU        "

## 2018-04-05 NOTE — PROGRESS NOTES
Group Psychotherapy (PhD/LCSW)    Site: [facility]    Clinical status of patient: Intensive Outpatient Program (IOP)    Date: 4/5/2018    Group Focus: CBT Group Psychotherapy    Length of service: 06779 - 45-50 minutes    Number of patients in attendance: 12    Referred by: Addictive Behavior Unit Treatment Team    Target symptoms: Alcohol Abuse and Depression    Patient's response to treatment: Active Listening    Progress toward goals: Progressing slowly    Interval History: Session focus was Cognitive Restructuring:  Using the ABCD model to help identify helpful thoughts in situations.  Patients were encouraged to identify antecedents (triggers), beliefs, consequences/feelings, and different (helpful) thoughts.    Diagnosis: Alcohol use disorder, severe, dependence    Plan: Continue treatment on ABU
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
## 2018-04-05 NOTE — PROGRESS NOTES
Group Psychotherapy (PhD/LCSW)    Site: [facility]    Clinical status of patient: Intensive Outpatient Program (IOP)    Date: 4/5/2018    Group Focus: Psychodynamic Group Psychotherapy    Length of service: 14289 - 45-50 minutes    Number of patients in attendance: 6    Referred by: Addictive Behavior Unit Treatment Team    Target symptoms: Alcohol Abuse    Patient's response to treatment: Active Listening and Self-disclosure    Progress toward goals: Progressing adequately    Interval History: Shared her story with the group.    Diagnosis: alcohol use disorder, severe, dependence    Plan: Continue treatment on ABU

## 2018-04-06 ENCOUNTER — HOSPITAL ENCOUNTER (OUTPATIENT)
Dept: PSYCHIATRY | Facility: HOSPITAL | Age: 43
Discharge: HOME | End: 2018-04-06
Attending: PSYCHIATRY & NEUROLOGY
Payer: COMMERCIAL

## 2018-04-06 VITALS — DIASTOLIC BLOOD PRESSURE: 69 MMHG | RESPIRATION RATE: 16 BRPM | SYSTOLIC BLOOD PRESSURE: 102 MMHG | HEART RATE: 76 BPM

## 2018-04-06 DIAGNOSIS — F33.2 MAJOR DEPRESSIVE DISORDER, RECURRENT SEVERE WITHOUT PSYCHOTIC FEATURES: ICD-10-CM

## 2018-04-06 DIAGNOSIS — F10.20 ALCOHOL USE DISORDER, SEVERE, DEPENDENCE: Primary | ICD-10-CM

## 2018-04-06 LAB
AMPHET+METHAMPHET UR QL: NEGATIVE
BARBITURATES UR QL SCN>200 NG/ML: NEGATIVE
BENZODIAZ UR QL SCN>200 NG/ML: NORMAL
BREATH ALCOHOL: 0
BZE UR QL SCN: NEGATIVE
CANNABINOIDS UR QL SCN: NEGATIVE
CREAT UR-MCNC: 99 MG/DL
ETHANOL UR-MCNC: <10 MG/DL
METHADONE UR QL SCN>300 NG/ML: NEGATIVE
OPIATES UR QL SCN: NEGATIVE
PCP UR QL SCN>25 NG/ML: NEGATIVE
TOXICOLOGY INFORMATION: NORMAL

## 2018-04-06 PROCEDURE — 90853 GROUP PSYCHOTHERAPY: CPT

## 2018-04-06 PROCEDURE — 90853 GROUP PSYCHOTHERAPY: CPT | Mod: ,,, | Performed by: PSYCHOLOGIST

## 2018-04-06 PROCEDURE — 80307 DRUG TEST PRSMV CHEM ANLYZR: CPT

## 2018-04-06 PROCEDURE — 99232 SBSQ HOSP IP/OBS MODERATE 35: CPT | Mod: ,,, | Performed by: PSYCHIATRY & NEUROLOGY

## 2018-04-06 PROCEDURE — 90853 GROUP PSYCHOTHERAPY: CPT | Mod: 59,,, | Performed by: PSYCHOLOGIST

## 2018-04-06 NOTE — PROGRESS NOTES
Group Psychotherapy (PhD/LCSW)    Site: [facility]    Clinical status of patient: Intensive Outpatient Program (IOP)    Date: 4/6/2018    Group Focus: Stress Management    Length of service: 58607 - 45-50 minutes    Number of patients in attendance: 12    Referred by: Addictive Behavior Unit Treatment Team    Target symptoms: Alcohol Abuse    Patient's response to treatment: Active Listening    Progress toward goals: Progressing adequately    Interval History: Group learned mindfulness techniques (senses and breath) to improve present-moment awareness, impulsive behavior tendencies, and tolerance of various emotional states.    Diagnosis: Alcohol Use Disorder    Plan: Continue treatment on ABU

## 2018-04-06 NOTE — PROGRESS NOTES
"Addiction Psychiatry Progress Note    ENCOUNTER DATE: 4/6/2018  SITE: [facility]    CHIEF COMPLAINT   Patient is a 42 y.o. female, admitted on 4/5/2018 with principal problem of alcohol use disorder    SUBJECTIVE  Lives in [place]; she attended an AA meeting last night.    Patient feels "fine." Endorses cravings that are associated with food, like a helen in Mexican restaurant. No cravings at home. Day 19 without a drink.    Has not filled the prescriptions for Effexor or Gabapentin because she was too tired. Overall, she feels fatigued and just went directly to bed yesterday. Plans to fill these two prescriptions this evening. Denies any negative side effects from her prior medication regimen.    Anxious to get back to work. She discusses being the main financial provider in her home. Again, we discuss the benefits of     Endorses symptoms of depression including boredom, fatigue, anhedonia. Eating well for her, mostly a grazer throughout the day. Denies SI/HI/AH/VH.    ROS  Denies any medical complaints    EXAMINATION    VITALS   Vitals:    04/06/18 1026   BP: 102/69   Pulse: 76   Resp: 16       CONSTITUTIONAL  General Appearance: age appropriate, neatly groomed, thin & gaunt looking    PSYCHIATRIC   Mental Status Exam:  Behavior/Cooperation: normal, cooperative  Speech: normal tone, normal rate, normal pitch, normal volume  Mood: depressed  Affect: blunted  Thought Process: normal and logical  Thought Content: normal, no suicidality, no homicidality, delusions, or paranoia  Orientation: grossly intact  Memory: Grossly intact  Attention Span/Concentration: Normal  Insight: fair  Judgment: poor    MEDICAL DECISION MAKING    Diagnosis:  Alcohol use disorder-severe  Substance-induced mood disorder     Plan:  1. Continue patient on ABU protocol.  2.  Breathalyzer and Urine toxicology three times a week.  3.  VS daily x 3 days.  4.  Patient counseled on continuing cessation from " alcohol.  5. Patient counseled again on reporting to the physician health program in each state of licensure. Also counseled that each PHP may have different requirements which may include more rigorous treatment including residential rehab to continue licensure.    6. Decreased to Celexa 20mg daily for 1 week on 4/5/18, Celexa 10mg daily for 1 week, then discontinue  7. Discontinue Valium on 4/5/18  8. Started gabapentin 300mg TID on 4/5/18  9. Started Effexor 37.5mg daily    Macarena Sandra MD  [facility] Psychiatry, PGY-2    Attending Attestation:    I have independently evaluated the patient and discussed the case with the resident. I have reviewed this note, edited it where appropriate, and agree with its current contents, including the assessment and plan.     Clarice Bethea MD

## 2018-04-07 NOTE — PROGRESS NOTES
Group Psychotherapy (PhD/LCSW)    Site: [facility]    Clinical status of patient: Intensive Outpatient Program (IOP)    Date: 4/6/2018    Group Focus: Psychodynamic Group Psychotherapy    Length of service: 84323 - 45-50 minutes    Number of patients in attendance: 6    Referred by: Addictive Behavior Unit Treatment Team    Target symptoms: Alcohol Abuse    Patient's response to treatment: Active Listening and Self-disclosure    Progress toward goals: Progressing adequately    Interval History: Pt shared her story with a new group member. .    Diagnosis: alcohol use disorder, severe, dependence    Plan: Continue treatment on ABU

## 2018-04-07 NOTE — PROGRESS NOTES
Group Psychotherapy (PhD/LCSW)    Site: [facility]    Clinical status of patient: Intensive Outpatient Program (IOP)    Date: 4/6/2018    Group Focus:  The Dynamics of Relationship       Length of service: 97015 - 45-50 minutes    Number of patients in attendance: 12    Referred by: Addictive Behavior Unit Treatment Team    Target symptoms: Alcohol Abuse    Patient's response to treatment: Active Listening    Progress toward goals: Progressing adequately    Interval History: Discussed strategies for restoring trust in relationships where it has been broken. Emphasized the value of empathy. Modeled how to use I-messages and Reflective Listening skills to communicate effectively in relation to trust issues.    Diagnosis: Alcohol Use Disorder    Plan: Continue treatment on ABU

## 2018-04-09 ENCOUNTER — HOSPITAL ENCOUNTER (OUTPATIENT)
Dept: PSYCHIATRY | Facility: HOSPITAL | Age: 43
Discharge: HOME | End: 2018-04-09
Attending: PSYCHIATRY & NEUROLOGY
Payer: COMMERCIAL

## 2018-04-09 VITALS — DIASTOLIC BLOOD PRESSURE: 73 MMHG | HEART RATE: 78 BPM | SYSTOLIC BLOOD PRESSURE: 111 MMHG | RESPIRATION RATE: 16 BRPM

## 2018-04-09 DIAGNOSIS — F10.20 ALCOHOL USE DISORDER, SEVERE, DEPENDENCE: Primary | ICD-10-CM

## 2018-04-09 DIAGNOSIS — F33.2 MAJOR DEPRESSIVE DISORDER, RECURRENT SEVERE WITHOUT PSYCHOTIC FEATURES: ICD-10-CM

## 2018-04-09 LAB — BREATH ALCOHOL: 0

## 2018-04-09 PROCEDURE — 90853 GROUP PSYCHOTHERAPY: CPT

## 2018-04-09 PROCEDURE — 90853 GROUP PSYCHOTHERAPY: CPT | Mod: 59,,, | Performed by: PSYCHOLOGIST

## 2018-04-09 PROCEDURE — 90853 GROUP PSYCHOTHERAPY: CPT | Performed by: SOCIAL WORKER

## 2018-04-09 PROCEDURE — 80307 DRUG TEST PRSMV CHEM ANLYZR: CPT

## 2018-04-09 PROCEDURE — 99232 SBSQ HOSP IP/OBS MODERATE 35: CPT | Mod: ,,, | Performed by: PSYCHIATRY & NEUROLOGY

## 2018-04-09 RX ORDER — GABAPENTIN 300 MG/1
300 CAPSULE ORAL 3 TIMES DAILY
Qty: 90 CAPSULE | Refills: 0 | Status: SHIPPED | OUTPATIENT
Start: 2018-04-09 | End: 2018-05-01

## 2018-04-09 RX ORDER — VENLAFAXINE HYDROCHLORIDE 37.5 MG/1
37.5 CAPSULE, EXTENDED RELEASE ORAL DAILY
Qty: 30 CAPSULE | Refills: 0 | Status: SHIPPED | OUTPATIENT
Start: 2018-04-09 | End: 2018-05-01

## 2018-04-09 NOTE — PROGRESS NOTES
Group Psychotherapy (PhD/LCSW)    Site: [facility]    Clinical status of patient: Intensive Outpatient Program (IOP)    Date: 4/9/2018    Group Focus: Psychodynamic Group Psychotherapy    Length of service: 90130 - 45-50 minutes    Number of patients in attendance: 7    Referred by: Addictive Behavior Unit Treatment Team    Target symptoms: Alcohol Abuse    Patient's response to treatment: Active Listening and Self-disclosure    Progress toward goals: Progressing adequately    Interval History: Pt shared her story with a new group member. Pt noted what a relief it has been for her to be in the ABU and AA knowing that she is not alone. She also noted how hard she expects it to be when her mother passes away (probably in the near future).     Diagnosis: alcohol use disorder, severe, dependence    Plan: Continue treatment on ABU

## 2018-04-09 NOTE — PLAN OF CARE
04/09/18 1000   Activity/Group Therapy Checklist   Group Relapse Prevention   Attendance Attended   Follows Direction Followed directions   Group Interactions/Observations Interacted appropriately   Affect/Mood Range Normal range   Affect/Mood Display Appropriate   Goal Progression Progressing

## 2018-04-09 NOTE — PROGRESS NOTES
Group Psychotherapy (PhD/LCSW)    Site: [facility]    Clinical status of patient: Intensive Outpatient Program (IOP)    Date: 4/9/2018    Group Focus:  The Dynamics of Relationship       Length of service: 52492 - 45-50 minutes    Number of patients in attendance: 14    Referred by: Addictive Behavior Unit Treatment Team    Target symptoms: Alcohol Abuse    Patient's response to treatment: Active Listening    Progress toward goals: Progressing adequately    Interval History: Discussed application of basic communication skills (choice of medium and I-messages) to typical problems in dysfunctional fx dynamics.    Diagnosis: Alcohol Use Disorder    Plan: Continue treatment on ABU

## 2018-04-09 NOTE — PROGRESS NOTES
"Addiction Psychiatry Progress Note    ENCOUNTER DATE: 4/9/2018  SITE: [facility], [facility]    CHIEF COMPLAINT : alcohol use disorder  Patient is a 42 y.o. female, admitted on 4/5/2018 with principal problem of alcohol use disorder    SUBJECTIVE  Patient had a good weekend. Endorses having trouble sleeping, especially falling asleep. Endorses more anxiety since stopping the Valium but says that anxiety does not impede her function, but she worries about her mother, work and finances. Mood is "fine." Denies SI/HI/AH/VH.    Patient could not find her prescriptions so re-printed gabapentin and Effexor XR prescriptions; patient plans to fill these prescriptions and start taking them tonight. Discussed that these medications will help with her anxiety as well.    Patient did not get to see her mother over the weekend. Plans to call work during lunch today and will email paperwork later tonight.    No cravings, no thoughts of alcohol. No alcohol in the house.    ROS  Denies any medical complaints    EXAMINATION    VITALS   There were no vitals filed for this visit.    CONSTITUTIONAL  General Appearance: age appropriate, neatly groomed, thin & gaunt looking    PSYCHIATRIC   Mental Status Exam:  Behavior/Cooperation: normal, cooperative  Speech: normal tone, normal rate, normal pitch, normal volume  Mood: depressed  Affect: blunted  Thought Process: normal and logical  Thought Content: normal, no suicidality, no homicidality, delusions, or paranoia  Orientation: grossly intact  Memory: Grossly intact  Attention Span/Concentration: Normal  Insight: fair  Judgment: poor    MEDICAL DECISION MAKING    Diagnosis:  Alcohol use disorder-severe  Substance-induced mood disorder     Plan:  1.  Start patient on ABU protocol.  2.  Breathalyzer and Urine toxicology three times daily.  3.  VS daily x 3 days.  4.  Patient counseled on continuing cessation from alcohol.  5. Patient previously counseled multiple times on " reporting to the physician health program in each state of licensure. Also counseled that each PHP may have different requirements which may include more rigorous treatment including residential rehab to continue licensure.    6. Decrease to Celexa 20mg daily for 1 week on 4/9/18, Celexa 10mg daily for 1 week, then discontinue  7. Discontinued Valium on 4/5/18  8. Start gabapentin 300mg TID on 4  9. Start Effexor ER 37.5mg daily    Macarena Sandra MD  [facility], PGY-2

## 2018-04-09 NOTE — PLAN OF CARE
04/09/18 1400   Activity/Group Therapy Checklist   Group Relapse Prevention   Attendance Attended   Follows Direction Followed directions   Group Interactions/Observations Interacted appropriately;Sharing;Supportive   Affect/Mood Range Normal range   Affect/Mood Display Appropriate   Goal Progression Progressing

## 2018-04-09 NOTE — TREATMENT PLAN
[facility]  ADDICTIVE BEHAVIOR UNIT  INTERDISCIPLINARY TREATMENT PLAN  INTENSIVE OUTPATIENT PROGRAM    INTERDISCIPLINARY  TREATMENT TEAM:    Pieter Bro M.D., Psychiatrist     Clarice Bethea M.D., Psychiatrist     Elizaebth Simons, Ph.D., Clinical Psychologist    Devora King R.N., Registered Nurse    Vinay Reyez, [facility],     Arvind John, [facility],     José Miguel Brennan, [facility],     Resident: Dr. Duarte               Signatures scanned into record separately.      ESTIMATED LOS:  4-6 weeks        The patient has reviewed the treatment plan with staff and has signed the Patient Responsibilities form.  Patient signature scanned into record separately        Dr. Pieter Bro certifies that the patient would require inpatient psychiatric care if the Partial Hospitalization services were not provided, and services will be furnished under the care of a physician, and under a written Plan of Treatment.    Pieter Bro M.D., Psychiatrist - Signature scanned into record separately.    TREATMENT PLAN    DIAGNOSIS: Alcohol Use Disorder, severe; SIMD       Patient/Family Education Needs/Barriers to Learning (i.e., Language, Reading, Comprehension): None       Support/Advocacy Services/Needs (i.e., Financial, Transportation, Medications): None       Community Resources (i.e., Alcoholics Anonymous, Al Anon, Cocaine Anonymous, Narcotics Anonymous): None           Strengths:  1. Helpful to others   2. Hard working    3. Committed to loved ones   4. Willing for treatment         Limitations:  1. Coping with grief/loss   2. Depression    3. Anxiety   4. Risk for relapse           Goals and Objectives:  1. Goal:  Abstain from alcohol and illicit drugs   Objective measure: Negative breathalyzer, negative urine screens   Time frame to reach goal: By discharge    2  Goal: Attend daily 12-step meetings   Objective measure: Signed attendance sheet daily   Time frame to reach goal:  Each day    3. Goal: Participate in group sessions    Objective measure: Progress notes indicating active listening, self-disclosure,   feedback   Time frame to reach goal: Each day    4. Goal: Obtain a 12-step sponsor   Objective measure: self-report   Time frame to reach goal: By discharge    5. Goal: Complete Life Story   Objective measure: Share story with group   Time frame to reach goal: Within first two weeks of treatment    6. Goal: Complete First Step   Objective measure: Share 1st step with group   Time frame to reach goal:  By discharge    7. Goal: Complete Relapse Prevention Plan   Objective measure: Share plan with group   Time frame to reach goal: By discharge    8.  Goal: Family involvement/participation   Objective measure: Family session documented in progress notes   Time frame to reach goal: By discharge    9. Goal:  Reduce depression   Objective measure: Physician progress note indicating depression is improved   Time frame to reach goal: By discharge    10.  Goal: Reduce anxiety   Objective measure: Physician progress note indicating anxiety is improved   Time frame to reach goal: By discharge            Group Interventions:  Psychodynamic Group Psychotherapy  1 hour, five times per week  Goals: 1. Utilize group empathy and support for problem solving; 2. Apply stress management, communication, and assertive skills to personal issues; 3. Discuss negative consequences of addictive behavior; 4. Discuss ways to change lifestyle to support sobriety; 5. Discuss addiction history    Addiction Education Group  1 hour, 2 times per week  Goals:  1. Verbalize increased knowledge of the process of recovery; 2. Understand basic concepts of addiction (denial, powerlessness, unmanageabiltiy, etc.); 3. Develop a consistent, positive image of self    Steps to Recovery Group  1 hour, 1 time per week  Goals:  1. Learn 12 steps; 2. Identify ways to incorporate 12 step principles into daily life; 3. Complete first  step; 4. Verbalize knowledge and understanding of the concept of a higher power    Living Sober Group  1 hour, 2 times per week  Goals:  1.  Reflect upon events of day/weekend, focusing on positive change; 2.  Discuss dynamics of 12 step meetings attended; 3. Discuss topics from book Living Sober     Stress Management Skills Group  1 hour, 3 times per week  Goals: 1. Identify types and levels of stress; 2. Identify and change maladaptive beliefs and behaviors; 3. Identify and practice techniques of stress management    Disease Concept Group  1 hour, 1 time per week  Goals: 1. Verbalize an understanding of the disease concept of addiction; 2. Increase familys understanding of the disease concept of addiction    Communication Skills Group  1 hour, 2 times per week  Goals: 1. Learn rules of effective communication; 2. Improve listening skills; 3. Practice clear communication    Promoting Healthy Lifestyles Group  1 hour, 1 time per week  Goals:  1. Understand the biopsychosocial model of health; 2. Develop insight into how substance abuse/dependency can impact dimensions of health; 3. Develop appropriate health promotion strategies    Relationship Dynamics Group  1 hour, 1 time per week  Goals:  1. Learn about factors that shape relationships; 2. Understand the central role of relationships in personal well-being; 3. Learn how to improve all relationships    Medical Complications Group  1 hour, 1 time per week  Goals:  1.  Increase knowledge of how addiction negatively affects the body; 2. Increase awareness of how abstinence can positively impact health

## 2018-04-10 ENCOUNTER — HOSPITAL ENCOUNTER (OUTPATIENT)
Dept: PSYCHIATRY | Facility: HOSPITAL | Age: 43
Discharge: HOME | End: 2018-04-10
Attending: PSYCHIATRY & NEUROLOGY
Payer: COMMERCIAL

## 2018-04-10 DIAGNOSIS — F10.20 ALCOHOL USE DISORDER, SEVERE, DEPENDENCE: Primary | ICD-10-CM

## 2018-04-10 DIAGNOSIS — F33.2 MAJOR DEPRESSIVE DISORDER, RECURRENT SEVERE WITHOUT PSYCHOTIC FEATURES: ICD-10-CM

## 2018-04-10 LAB
AMPHET+METHAMPHET UR QL: NEGATIVE
BARBITURATES UR QL SCN>200 NG/ML: NEGATIVE
BENZODIAZ UR QL SCN>200 NG/ML: NORMAL
BREATH ALCOHOL: 0
BZE UR QL SCN: NEGATIVE
CANNABINOIDS UR QL SCN: NEGATIVE
CREAT UR-MCNC: 22 MG/DL
ETHANOL UR-MCNC: <10 MG/DL
METHADONE UR QL SCN>300 NG/ML: NEGATIVE
OPIATES UR QL SCN: NEGATIVE
PCP UR QL SCN>25 NG/ML: NEGATIVE
TOXICOLOGY INFORMATION: NORMAL

## 2018-04-10 PROCEDURE — 90853 GROUP PSYCHOTHERAPY: CPT | Performed by: SOCIAL WORKER

## 2018-04-10 PROCEDURE — 90853 GROUP PSYCHOTHERAPY: CPT | Mod: ,,, | Performed by: PSYCHOLOGIST

## 2018-04-10 PROCEDURE — 90853 GROUP PSYCHOTHERAPY: CPT

## 2018-04-10 NOTE — PROGRESS NOTES
Group Psychotherapy (PhD/LCSW)    Site: [facility]    Clinical status of patient: Intensive Outpatient Program (IOP)    Date: 4/10/2018    Group Focus: Psychodynamic Group Psychotherapy    Length of service: 82725 - 45-50 minutes    Number of patients in attendance: 6    Referred by: Addictive Behavior Unit Treatment Team    Target symptoms: Alcohol Abuse    Patient's response to treatment: Active Listening and Self-disclosure    Progress toward goals: Progressing adequately    Interval History: Discussed how she responded when  was drunk when she arrived home from . Did not criticize.    Diagnosis: alcohol use disorder, severe, dependence    Plan: Continue treatment on ABU

## 2018-04-10 NOTE — PROGRESS NOTES
"Group Psychotherapy (PhD/LCSW)    Site: [facility]    Clinical status of patient: Intensive Outpatient Program (IOP)    Date: 4/10/2018    Group Focus: Disease Model of Addiction      Length of service: 66657 - 45-50 minutes    Number of patients in attendance: 7    Referred by: Addictive Behavior Unit Treatment Team    Target symptoms: Alcohol Abuse    Patient's response to treatment: Active Listening      Progress toward goals: Progressing adequately    Interval History: Discussed the basic neuropsychological concepts of the Disease Model of Addiction and the way they relate to the subjective phenomena of addiction and recovery (eg, powerlessness; using thoughts and cravings; euphoric recall"; relapse and relapse prevention; tolerance). Noted the way the Disease Model comports with 12-step principles and activities and the value of understanding the Disease Model for sustaining long-term recovery.      Diagnosis: alcohol use disorder, severe, dependence    Plan: Continue treatment on ABU        "

## 2018-04-10 NOTE — PLAN OF CARE
04/10/18 1400   Activity/Group Therapy Checklist   Group Educational  (defenses )   Attendance Attended   Follows Direction Followed directions   Group Interactions/Observations Interacted appropriately   Affect/Mood Range Normal range   Affect/Mood Display Appropriate   Goal Progression Progressing

## 2018-04-11 ENCOUNTER — HOSPITAL ENCOUNTER (OUTPATIENT)
Dept: PSYCHIATRY | Facility: HOSPITAL | Age: 43
Discharge: HOME | End: 2018-04-11
Attending: PSYCHIATRY & NEUROLOGY
Payer: COMMERCIAL

## 2018-04-11 VITALS — HEART RATE: 83 BPM | DIASTOLIC BLOOD PRESSURE: 83 MMHG | SYSTOLIC BLOOD PRESSURE: 101 MMHG | RESPIRATION RATE: 16 BRPM

## 2018-04-11 DIAGNOSIS — F10.20 ALCOHOL USE DISORDER, SEVERE, DEPENDENCE: Primary | ICD-10-CM

## 2018-04-11 DIAGNOSIS — F33.2 MAJOR DEPRESSIVE DISORDER, RECURRENT SEVERE WITHOUT PSYCHOTIC FEATURES: ICD-10-CM

## 2018-04-11 LAB
AMPHET+METHAMPHET UR QL: NEGATIVE
BARBITURATES UR QL SCN>200 NG/ML: NEGATIVE
BENZODIAZ UR QL SCN>200 NG/ML: NORMAL
BREATH ALCOHOL: 0
BZE UR QL SCN: NEGATIVE
CANNABINOIDS UR QL SCN: NEGATIVE
CREAT UR-MCNC: 23 MG/DL
ETHANOL UR-MCNC: <10 MG/DL
METHADONE UR QL SCN>300 NG/ML: NEGATIVE
OPIATES UR QL SCN: NEGATIVE
PCP UR QL SCN>25 NG/ML: NEGATIVE
TOXICOLOGY INFORMATION: NORMAL

## 2018-04-11 PROCEDURE — 90853 GROUP PSYCHOTHERAPY: CPT

## 2018-04-11 PROCEDURE — 90853 GROUP PSYCHOTHERAPY: CPT | Mod: ,,, | Performed by: PSYCHOLOGIST

## 2018-04-11 PROCEDURE — 90853 GROUP PSYCHOTHERAPY: CPT | Mod: ,,, | Performed by: PSYCHIATRY & NEUROLOGY

## 2018-04-11 PROCEDURE — 90853 GROUP PSYCHOTHERAPY: CPT | Performed by: SOCIAL WORKER

## 2018-04-11 PROCEDURE — 80346 BENZODIAZEPINES1-12: CPT

## 2018-04-11 PROCEDURE — 80307 DRUG TEST PRSMV CHEM ANLYZR: CPT

## 2018-04-11 PROCEDURE — 99232 SBSQ HOSP IP/OBS MODERATE 35: CPT | Mod: 25,,, | Performed by: PSYCHIATRY & NEUROLOGY

## 2018-04-11 NOTE — PROGRESS NOTES
Group Psychotherapy (PhD/LCSW)    Site: [facility]    Clinical status of patient: Intensive Outpatient Program (IOP)    Date: 4/11/2018    Group Focus: DBT-Based Group Psychotherapy    Length of service: 23627 - 45-50 minutes    Number of patients in attendance: 12    Referred by: Addictive Behavior Unit Treatment Team    Target symptoms: Alcohol Abuse    Patient's response to treatment: Active Listening and Self-disclosure    Progress toward goals: Progressing adequately    Interval History: Session focus was Emotion Regulation:  Check the Facts.  Patients were encouraged to understand what their emotions do for them (motivate them to action, communicate to themselves and others).  They were encouraged to check the facts to ensure their emotion intensity fits the situation.    Diagnosis: alcohol use disorder, severe, dependence    Plan: Continue treatment on ABU

## 2018-04-11 NOTE — PATIENT CARE CONFERENCE
Alcohol use disorder, severe  Substance-induced mood disorder     1. Pt is attending all groups    2. Pt is attending all meetings  3. Pt 's has minimally supportive family  4. Pt has completed spiritual assessment    5. Pt will present life story    6. Pt will present Step One assignment    7. Pt is exploring issues related to relapse  prevention; spirituality; stress management; improved communication skills; assertiveness training; poor self-esteem; disease concepts; cross addictions; and, work related issues    8. D/C date: TBD     Staff discussed pt warming up to group, pt's strange affect and pt appearing anxious and superficial. Staff discussed pt psychosocial hx related to pt's career, pt identifying as a mean drunk, and pt's strained relationship with mother and sister. Staff discussed pt's recent incident with  related to  being intoxicated when pt returned home from AA. Staff discussed pt report of having no feelings towards incident. Staff discussed possibly having  come in for family day and/or sister. Staff discussed pt trigger of receiving bad news. Staff discussed pt high risk of relapse and creating a relapse plan. Staff discussed medication management related to possibly starting antabuse or naltrexone. Staff discussed ordering a blood test, alcohol biomarker, and quantitative on benzos for pt.    Problem: Alcohol use Disorder, Severe  Goal: Address in 12 step meetings and group and individual sessions    Objective Measure: participation in groups, self report, length of sobriety, and relapse prevention plan  Time: Prior to discharge    Progress: Pt is attending groups and sessions     Problem: Substance-induced mood disorder   Goal: Address in 12 step meetings and group and individual sessions    Objective Measure: participation in groups, self report, length of sobriety, and relapse prevention plan  Time: Prior to discharge    Progress: Pt is attending groups and sessions         Staff members present:    MD Dr. Gerald Madrigal MD, Resident  Dr. Macarena Sandra MD, Resident  Dr. Simons, Ph.D.  José Miguel Brennan, GISELLAW  iVnay Reyez, GISELLAW  Devora King RN

## 2018-04-11 NOTE — PROGRESS NOTES
Group Psychotherapy (PhD/LCSW)    Site: [facility]    Clinical status of patient: Intensive Outpatient Program (IOP)    Date: 4/11/2018    Group Focus: Psychodynamic Group Psychotherapy    Length of service: 81641 - 45-50 minutes    Number of patients in attendance: 8    Referred by: Addictive Behavior Unit Treatment Team    Target symptoms: Alcohol Abuse    Patient's response to treatment: Active Listening and Self-disclosure    Progress toward goals: Progressing adequately    Interval History: Shared her story with new group member.    Diagnosis: alcohol use disorder, severe, dependence    Plan: Continue treatment on ABU

## 2018-04-11 NOTE — PLAN OF CARE
04/11/18 1400   Activity/Group Therapy Checklist   Group Educational  (life balance )   Attendance Attended   Follows Direction Followed directions   Group Interactions/Observations Interacted appropriately   Affect/Mood Range Normal range   Affect/Mood Display Appropriate   Goal Progression Progressing

## 2018-04-11 NOTE — PSYCH
PRESENTING PROBLEM:    Date:  2018                  Time: 10:12 AM  Name: Brooke Schwartz  Age: 42 y.o.             : 1975           Race:  American     Precipitating Event: 42 year old female admitted to the [facility] with principal problem of alcohol use disorder.  Patient reports that she has been drinking since college.  Alcohol has been an issue for the past 4-5 years.  Patient reports that she does not drink every day, only a couple of days per week.  She reports that she drinks 4-5 vodka drinks when she drinks.  About 3 weeks ago, patient spent five days in bed.  She missed work for five days (did not call in), as she stayed at home and drank.  Patient's spouse subsequently took her to the ED, due to concerns related to her alcohol use.  She was placed on a 72 hour hold and admitted to an inpatient psychiatric unit.  Subsequently, she was discharged.  Reports that she last drank on 3/20/18.  She reports history of grief/loss.  Her father  unexpectedly.  Her mother has been diagnosed with stage 4 metastatic cancer.  No prior treatment for alcohol use disorder.      Consequences of Use (Explain):Family and Occupational  Biomedical complication of use: none   Motivation for Change (Explain): yes   Needed Skills to Achieve Goals: maintain abstinence from the use of all mood altering chemicals; develop understanding of the disease concept of addiction; develop a recovery support group; develop an individualized relapse prevention plan     CHILDHOOD and FAMILY HISTORY    Issue or Concerns Related to Childhood: none   Childhood/Adolescent Behavior Problems: none   Family History:   - Father (name and age): patient's father is ; he was a physician    - Paternal History of Substance Abuse/Mental Health: none    - Mother (name and age): patient's mother resides in the [facility] area    - Maternal History of Substance Abuse/Mental Health: history of depression-formerly prescribed  Celexa    - Siblings (name[s] and age[s]): One brother (41), one sister (28)    -Siblings Substance Abuse/Mental Health: patient's sister has a history of depression-formerly prescribed Celexa     Relationship with family members: strained; patient can only visit her mother when her sister allows her to do so        Marital Status:   Relationship History: patient is    Children: none    -Substance Abuse/Mental Health Concerns: n/a    -Relationship with children: n/a   Living Situation: resides with spouse   Support System: spouse   Psychosocial Stressors related to interpersonal relationships: none     EDUCATION and OCCUPATIONAL    Education Level: MD   Occupation Status: employed   Previous/Current Occupation: ER physician at the [employer]   Financial Status: upper middle economy; patient's spouse is not currently employed   Psychosocial Stressors related to work or finances: none     MENTAL HEALTH AND SUBSTANCE ABUSE    Psychiatric History/Previous Substance Abuse: Therapy Outpatient saw a  in [facility]  and Past Psych Hospitalizations [facility]   Substance Abuse History: Alcohollast drank 3/20/18; typically, patient would drink 4-5 vodka drinks, couple times per week, Caffeinecoffee, soda , Nicotine1 ppd  and BenzodiazepinesWas being prescribed Valium 10mg TID   Other Addictive Behaviors: none   History of Detoxification Treatment/ Residential Treatment Facility: no   History of Inpatient Psychiatric Care: yes   HIV/AIDS/Sexually Transmitted Disease Risk (unsafe sex/needle sharing): none   Suicidal/Homicidal Ideation and/or Plan (Explain): patient denies    Current Risk: low   Psychosocial Stressors related to mental health or substance abuse: none      OTHER RELATED HISTORY    Current Health Concerns: none   Physical/Emotional/Sexual Abuse: patient denies   Trauma History: none    History: No  Holiness/Spiritual Orientation: none   Spiritual impact on treatment: minimal    Current/Past Legal History: patient denies    -Probation/: N/A  Access To Guns: No   -Secured: N/A  Psychosocial Stressors related to other health history: none     Past Medical History:   Diagnosis Date    Hx of psychiatric care     Selena in her 20s for depression    Psychiatric problem     Therapy        No past surgical history on file.    No family history on file.    Social History     Social History    Marital status:      Spouse name: N/A    Number of children: 0    Years of education: N/A     Social History Main Topics    Smoking status: Never Smoker    Smokeless tobacco: Never Used    Alcohol use Yes      Comment: drinking half pint to a pint of Fireball whiskey or vodka daily    Drug use: No    Sexual activity: Not on file     Other Topics Concern    Not on file     Social History Narrative    No narrative on file       Current Outpatient Prescriptions   Medication Sig Dispense Refill    citalopram (CELEXA) 40 MG tablet Take 40 mg by mouth once daily.      diazePAM (VALIUM) 10 MG Tab Take 10 mg by mouth.      gabapentin (NEURONTIN) 300 MG capsule Take 1 capsule (300 mg total) by mouth 3 (three) times daily. 90 capsule 0    gabapentin (NEURONTIN) 300 MG capsule Take 1 capsule (300 mg total) by mouth 3 (three) times daily. 90 capsule 0    ranitidine (ZANTAC) 150 MG capsule Take 1 capsule (150 mg total) by mouth 2 (two) times daily. 60 capsule 2    rosuvastatin (CRESTOR) 10 MG tablet Take 10 mg by mouth once daily.      venlafaxine (EFFEXOR-XR) 37.5 MG 24 hr capsule Take 1 capsule (37.5 mg total) by mouth once daily. 30 capsule 0    venlafaxine (EFFEXOR-XR) 37.5 MG 24 hr capsule Take 1 capsule (37.5 mg total) by mouth once daily. 30 capsule 0     No current facility-administered medications for this encounter.        Review of patient's allergies indicates:  No Known Allergies    DIAGNOSIS AND IMPRESSIONS    Diagnosis: Alcohol Use Disorder, severe; SIMD   Baseline:  verbalize coping skills and healthy lifestyle changes   Impressions: Patient is cooperative with Sw during assessment.  Appears anxious intermittently.  Incongruent affect.  She verbalizes motivation for treatment.

## 2018-04-11 NOTE — PROGRESS NOTES
"Addiction Psychiatry Progress Note    ENCOUNTER DATE: 4/11/2018  SITE: [facility]    CHIEF COMPLAINT : alcohol use disorder  Patient is a 42 y.o. female, admitted on 4/5/2018 with principal problem of alcohol use disorder    SUBJECTIVE    Patient says "Maybe, I'm happy." Had a lot of pressure on her but spoke with her work today and got some paperwork figured out. Also, she found out that she will likely get paid for the entirety of the program which is also relieving.    Continues to endorse some difficutly falling asleep, but this is improving. Endorses a reduction in her anxiety level from a 100 to 20.    Started the Effexor XR, reduced the Celexa and started the gabapentin yesterday. Thus far, no side effects. Does not feel better herself but has been told repeatedly that she is looking brighter; overall, she knows this improvement is likely not from taking the new medications one day. Denies SI/HI/AH/VH.    Had one thought of alcohol while watching a movie; however, it was a fleeting thought, and otherwise, she has not had any alcohol cravings.    Making plans to see her mother; hopes to visit her with just her  and without the company of her sister or brother-in-law.    ROS  Denies any medical complaints    EXAMINATION    VITALS   Vitals:    04/11/18 1137   BP: 101/83   Pulse: 83   Resp: 16       CONSTITUTIONAL  General Appearance: age appropriate, neatly groomed, thin & gaunt looking    PSYCHIATRIC   Mental Status Exam:  Behavior/Cooperation: normal, cooperative  Speech: normal tone, normal rate, normal pitch, normal volume  Mood: "maybe I'm happy"  Affect: improving, slightly brighter  Thought Process: normal and logical  Thought Content: normal, no suicidality, no homicidality, delusions, or paranoia  Orientation: grossly intact  Memory: Grossly intact  Attention Span/Concentration: Normal  Insight: fair  Judgment: fair    MEDICAL DECISION MAKING    Diagnosis:  Alcohol use " disorder-severe  Substance-induced mood disorder     Plan:  1.  Start patient on ABU protocol.  2.  Breathalyzer and Urine toxicology three times daily.  3.  VS daily x 3 days.  4.  Patient counseled on continuing cessation from alcohol.  5. Patient previously counseled multiple times on reporting to the physician health program in each state of licensure. Also counseled that each PHP may have different requirements which may include more rigorous treatment including residential rehab to continue licensure.    6. Decrease to Celexa 20mg daily for 1 week on 4/9/18, Celexa 10mg daily for 1 week, then discontinue  7. Discontinued Valium on 4/5/18  8. Continue gabapentin 300mg TID  9. Continue Effexor ER 37.5mg daily    Macarena Sandra MD  [facility], PGY-2

## 2018-04-12 ENCOUNTER — HOSPITAL ENCOUNTER (OUTPATIENT)
Dept: PSYCHIATRY | Facility: HOSPITAL | Age: 43
Discharge: HOME | End: 2018-04-12
Attending: PSYCHIATRY & NEUROLOGY
Payer: COMMERCIAL

## 2018-04-12 DIAGNOSIS — F10.20 ALCOHOL USE DISORDER, SEVERE, DEPENDENCE: Primary | ICD-10-CM

## 2018-04-12 DIAGNOSIS — F33.2 MAJOR DEPRESSIVE DISORDER, RECURRENT SEVERE WITHOUT PSYCHOTIC FEATURES: ICD-10-CM

## 2018-04-12 LAB — BREATH ALCOHOL: NORMAL

## 2018-04-12 PROCEDURE — 90853 GROUP PSYCHOTHERAPY: CPT | Mod: ,,, | Performed by: PSYCHOLOGIST

## 2018-04-12 PROCEDURE — 90853 GROUP PSYCHOTHERAPY: CPT | Performed by: SOCIAL WORKER

## 2018-04-12 PROCEDURE — 90853 GROUP PSYCHOTHERAPY: CPT

## 2018-04-12 NOTE — PROGRESS NOTES
Group Psychotherapy (PhD/LCSW)    Site: [facility]    Clinical status of patient: Intensive Outpatient Program (IOP)    Date: 4/12/2018    Group Focus: Psychodynamic Group Psychotherapy    Length of service: 13462 - 45-50 minutes    Number of patients in attendance: 8    Referred by: Addictive Behavior Unit Treatment Team    Target symptoms: Alcohol Abuse    Patient's response to treatment: Active Listening and Self-disclosure    Progress toward goals: Progressing adequately    Interval History: Discussed concerns about 's drinking and how he might change as she stays sober.    Diagnosis: alcohol use disorder, severe, dependence    Plan: Continue treatment on ABU

## 2018-04-12 NOTE — PROGRESS NOTES
"Addiction Psychiatry Progress Note    ENCOUNTER DATE: 4/12/2018  SITE: [facility], [facility]    CHIEF COMPLAINT : alcohol use disorder  Patient is a 42 y.o. female, admitted on 4/5/2018 with principal problem of alcohol use disorder    SUBJECTIVE  Fell asleep at 2 am; had taken the gabapentin at 9pm. Thinks that the gabapentin is making her sleepy overall. She plans to reduce to twice daily dosing.    Irritable when she got home last night; she was able to note her heightened level of anxiety. She is attempting to avoid catastrophizing.    Mood is "ok." Denies SI/HI/AH/VH. Eating ok, had crawfish yesterday.    Continues to make plans to see mother, hopefully on Monday night.    ROS  Denies any medical complaints    EXAMINATION    VITALS   There were no vitals filed for this visit.    CONSTITUTIONAL  General Appearance: age appropriate, neatly groomed, thin & gaunt looking    PSYCHIATRIC   Mental Status Exam:  Behavior/Cooperation: normal, cooperative  Speech: normal tone, normal rate, normal pitch, normal volume  Mood: "ok"  Affect: slightly brighter  Thought Process: normal and logical  Thought Content: normal, no suicidality, no homicidality, delusions, or paranoia  Orientation: grossly intact  Memory: Grossly intact  Attention Span/Concentration: Normal  Insight: fair  Judgment: fair    MEDICAL DECISION MAKING    Diagnosis:  Alcohol use disorder-severe  Substance-induced mood disorder     Plan:  1.  Start patient on ABU protocol.  2.  Breathalyzer and Urine toxicology three times daily.  3.  VS daily x 3 days.  4.  Patient counseled on continuing cessation from alcohol.  5. Patient previously counseled multiple times on reporting to the physician health program in each state of licensure. Also counseled that each PHP may have different requirements which may include more rigorous treatment including residential rehab to continue licensure.    6. Decrease to Celexa 20mg daily for 1 week on 4/9/18, " Celexa 10mg daily for 1 week, then discontinue  7. Discontinued Valium on 4/5/18  8. Decrease gabapentin 300mg BID  9. Continue Effexor ER 37.5mg daily    10. will order alcohol biomarkers - urinary ETG and ETS    Macarena Sandra MD  [facility], PGY-2

## 2018-04-12 NOTE — PLAN OF CARE
04/12/18 1400   Activity/Group Therapy Checklist   Group Relapse Prevention   Attendance Attended   Follows Direction Followed directions   Group Interactions/Observations Interacted appropriately;Sharing;Supportive  (assisted peer in understanding preoccupation with alcohol and listed examples)   Affect/Mood Range Normal range   Affect/Mood Display Appropriate   Goal Progression Progressing

## 2018-04-12 NOTE — PLAN OF CARE
04/12/18 1000   Activity/Group Therapy Checklist   Group Educational  (codependency )   Attendance Attended   Follows Direction Followed directions   Group Interactions/Observations Interacted appropriately   Affect/Mood Range Normal range   Affect/Mood Display Appropriate   Goal Progression Progressing

## 2018-04-12 NOTE — PROGRESS NOTES
Group Psychotherapy (PhD/LCSW)    Site: [facility]    Clinical status of patient: Intensive Outpatient Program (IOP)    Date: 4/12/2018    Group Focus: DBT-Based Group Psychotherapy    Length of service: 82607 - 45-50 minutes    Number of patients in attendance: 14    Referred by: Addictive Behavior Unit Treatment Team    Target symptoms: Alcohol Abuse    Patient's response to treatment: Active Listening and Self-disclosure    Progress toward goals: Progressing adequately    Interval History: Session focus was Emotion Regulation:  Opposite Action.  Patients identified action urges for each emotion and learned how to engage in opposite action when the emotions are not justified or unhelpful.    Diagnosis: alcohol use disorder, severe, dependence    Plan: Continue treatment on ABU

## 2018-04-13 ENCOUNTER — HOSPITAL ENCOUNTER (OUTPATIENT)
Dept: PSYCHIATRY | Facility: HOSPITAL | Age: 43
Discharge: HOME | End: 2018-04-13
Attending: PSYCHIATRY & NEUROLOGY
Payer: COMMERCIAL

## 2018-04-13 VITALS — RESPIRATION RATE: 16 BRPM | HEART RATE: 89 BPM | SYSTOLIC BLOOD PRESSURE: 109 MMHG | DIASTOLIC BLOOD PRESSURE: 79 MMHG

## 2018-04-13 DIAGNOSIS — F10.20 ALCOHOL USE DISORDER, SEVERE, DEPENDENCE: ICD-10-CM

## 2018-04-13 DIAGNOSIS — F33.2 MAJOR DEPRESSIVE DISORDER, RECURRENT SEVERE WITHOUT PSYCHOTIC FEATURES: ICD-10-CM

## 2018-04-13 LAB
AMPHET+METHAMPHET UR QL: NEGATIVE
BARBITURATES UR QL SCN>200 NG/ML: NEGATIVE
BENZODIAZ UR QL SCN>200 NG/ML: ABNORMAL
BREATH ALCOHOL: 0
BZE UR QL SCN: NEGATIVE
CANNABINOIDS UR QL SCN: NEGATIVE
CREAT UR-MCNC: 11 MG/DL
ETHANOL UR-MCNC: <10 MG/DL
ETHYL GLUCURONIDE: NORMAL
METHADONE UR QL SCN>300 NG/ML: NEGATIVE
OPIATES UR QL SCN: NEGATIVE
PCP UR QL SCN>25 NG/ML: NEGATIVE
TOXICOLOGY INFORMATION: ABNORMAL

## 2018-04-13 PROCEDURE — 80307 DRUG TEST PRSMV CHEM ANLYZR: CPT

## 2018-04-13 PROCEDURE — 90853 GROUP PSYCHOTHERAPY: CPT | Mod: ,,, | Performed by: PSYCHOLOGIST

## 2018-04-13 PROCEDURE — 90853 GROUP PSYCHOTHERAPY: CPT | Performed by: SOCIAL WORKER

## 2018-04-13 PROCEDURE — 90853 GROUP PSYCHOTHERAPY: CPT

## 2018-04-13 NOTE — PROGRESS NOTES
Group Psychotherapy (PhD/LCSW)    Site: [facility]    Clinical status of patient: Intensive Outpatient Program (IOP)    Date: 4/13/2018    Group Focus: DBT-Based Group Psychotherapy    Length of service: 77542 - 45-50 minutes    Number of patients in attendance: 13    Referred by: Addictive Behavior Unit Treatment Team    Target symptoms: Alcohol Abuse    Patient's response to treatment: Active Listening and Self-disclosure    Progress toward goals: Progressing adequately    Interval History: Session focus was Mindfulness.  Patients were introduced to the goals of mindfulness practice and provided with several skills to formally and informally practice mindfulness.    Diagnosis: alcohol use disorder, severe, dependence    Plan: Continue treatment on ABU

## 2018-04-13 NOTE — PLAN OF CARE
04/13/18 1300   Activity/Group Therapy Checklist   Group Relapse Prevention   Attendance Attended   Follows Direction Followed directions   Group Interactions/Observations Interacted appropriately;Sharing;Supportive  (assisted peer in understanding first step)   Affect/Mood Range Normal range   Affect/Mood Display Appropriate   Goal Progression Progressing

## 2018-04-13 NOTE — PLAN OF CARE
04/13/18 1400   Activity/Group Therapy Checklist   Group Goals/Reflection  (process )   Attendance Attended   Follows Direction Followed directions   Group Interactions/Observations Interacted appropriately   Affect/Mood Range Normal range   Affect/Mood Display Appropriate   Goal Progression Progressing

## 2018-04-14 NOTE — PROGRESS NOTES
Group Psychotherapy (PhD/LCSW)    Site: [facility]    Clinical status of patient: Intensive Outpatient Program (IOP)    Date: 4/13/2018    Group Focus:The Dynamics of Relationship       Length of service: 28142 - 45-50 minutes    Number of patients in attendance: 13    Referred by: Addictive Behavior Unit Treatment Team    Target symptoms: Alcohol Abuse    Patient's response to treatment: Active Listening      Progress toward goals: Progressing adequately    Interval History: Discussed  the way differences in gender, fx of origin background, and ethnicity can present obstacles in the communication process when they are not taken into account. Examples provided of how such differences create problems and how these problems may be overcome    Diagnosis: alcohol use disorder, severe, dependence    Plan: Continue treatment on ABU

## 2018-04-16 ENCOUNTER — HOSPITAL ENCOUNTER (OUTPATIENT)
Dept: PSYCHIATRY | Facility: HOSPITAL | Age: 43
Discharge: HOME | End: 2018-04-16
Attending: PSYCHIATRY & NEUROLOGY
Payer: COMMERCIAL

## 2018-04-16 VITALS — HEART RATE: 74 BPM | RESPIRATION RATE: 16 BRPM | DIASTOLIC BLOOD PRESSURE: 77 MMHG | SYSTOLIC BLOOD PRESSURE: 108 MMHG

## 2018-04-16 DIAGNOSIS — F10.20 ALCOHOL USE DISORDER, SEVERE, DEPENDENCE: Primary | ICD-10-CM

## 2018-04-16 DIAGNOSIS — F33.2 MAJOR DEPRESSIVE DISORDER, RECURRENT SEVERE WITHOUT PSYCHOTIC FEATURES: ICD-10-CM

## 2018-04-16 LAB
AMPHET+METHAMPHET UR QL: NEGATIVE
BARBITURATES UR QL SCN>200 NG/ML: NEGATIVE
BENZODIAZ UR QL SCN>200 NG/ML: NORMAL
BREATH ALCOHOL: 0
BZE UR QL SCN: NEGATIVE
CANNABINOIDS UR QL SCN: NEGATIVE
CREAT UR-MCNC: 16 MG/DL
ETHANOL UR-MCNC: <10 MG/DL
METHADONE UR QL SCN>300 NG/ML: NEGATIVE
OPIATES UR QL SCN: NEGATIVE
PCP UR QL SCN>25 NG/ML: NEGATIVE
TOXICOLOGY INFORMATION: NORMAL

## 2018-04-16 PROCEDURE — 90853 GROUP PSYCHOTHERAPY: CPT | Performed by: SOCIAL WORKER

## 2018-04-16 PROCEDURE — 90853 GROUP PSYCHOTHERAPY: CPT

## 2018-04-16 PROCEDURE — 80307 DRUG TEST PRSMV CHEM ANLYZR: CPT

## 2018-04-16 PROCEDURE — 90853 GROUP PSYCHOTHERAPY: CPT | Mod: 59,,, | Performed by: PSYCHOLOGIST

## 2018-04-16 PROCEDURE — 99232 SBSQ HOSP IP/OBS MODERATE 35: CPT | Mod: ,,, | Performed by: PSYCHIATRY & NEUROLOGY

## 2018-04-16 NOTE — PROGRESS NOTES
"Addiction Psychiatry Progress Note    ENCOUNTER DATE: 4/16/2018  SITE: [facility], [facility]    CHIEF COMPLAINT : alcohol use disorder  Patient is a 42 y.o. female, admitted on 4/5/2018 with principal problem of alcohol use disorder    SUBJECTIVE  Patient has been sleeping well. Appetite good.    Endorses attending meetings and maintaining sobriety.    Sister responded that patient's mom did not want her to visit. She was upset about this and worried about it all weekend. Will continue to pursue visiting her mother without playing games with her sister. Plans to text her friend to try and meet up today.    Mood is "ok." Denies SI/HI/AH/VH.    ROS  Denies any medical complaints    EXAMINATION    VITALS   Vitals:    04/16/18 1225   BP: 108/77   Pulse: 74   Resp: 16     Labs  4/16/18: Urine drug test positive for benzos only  4/16/18: BAL negative.  Ethyl glucuronide 4/11 negative.        CONSTITUTIONAL  General Appearance: age appropriate, neatly groomed, thin & gaunt looking    PSYCHIATRIC   Mental Status Exam:  Behavior/Cooperation: normal, cooperative  Speech: normal tone, normal rate, normal pitch, normal volume  Mood: "ok"  Affect: more constricted again today  Thought Process: normal and logical  Thought Content: normal, no suicidality, no homicidality, delusions, or paranoia  Orientation: grossly intact  Memory: Grossly intact  Attention Span/Concentration: Normal  Insight: fair  Judgment: fair    MEDICAL DECISION MAKING    Diagnosis:  Alcohol use disorder-severe  Substance-induced mood disorder     Plan:  1. Continue patient on ABU protocol.  2.  Breathalyzer daily and Urine toxicology three times a week.  3.  VS daily 3 days a week  4.  Patient counseled on continuing cessation from alcohol.  5. Patient previously counseled multiple times on reporting to the physician health program in each state of licensure. Also counseled that each PHP may have different requirements which may include more " rigorous treatment including residential rehab to continue licensure.    6. Discontiued Celexa  7. Discontinued Valium on 4/5/18  8. Increased to gabapentin 300mg TID  9. Continue Effexor ER 37.5mg daily    10. will order alcohol biomarkers - urinary ETG and ETS; Alcohol biomarkers from last week negative.    Macarena Sandra MD  [facility], PGY-2    Attending Attestation:    I have independently evaluated the patient and discussed the case with the resident. I have reviewed this note, edited it where appropriate, and agree with its current contents, including the assessment and plan.     Patient requires continued [facility] level of care due to severity of alcohol use disorder, unsafe home environment for recovery ( drinking heavily-to be addressed in family meetings), toxic relationship with sister who is keeping patient from seeing dying mother (a trigger for her drinking binges).      Clarice Bethea MD

## 2018-04-16 NOTE — PLAN OF CARE
04/16/18 1400   Activity/Group Therapy Checklist   Group Relapse Prevention   Attendance Attended   Follows Direction Followed directions   Group Interactions/Observations Interacted appropriately;Sharing;Supportive   Affect/Mood Range Normal range   Affect/Mood Display Appropriate   Goal Progression Progressing

## 2018-04-16 NOTE — PROGRESS NOTES
Group Psychotherapy (PhD/LCSW)    Site: [facility]    Clinical status of patient: Intensive Outpatient Program (IOP)    Date: 4/16/2018    Group Focus: Psychodynamic Group Psychotherapy    Length of service: 06969 - 45-50 minutes    Number of patients in attendance: 9    Referred by: Addictive Behavior Unit Treatment Team    Target symptoms: Alcohol Abuse    Patient's response to treatment: Active Listening and Self-disclosure    Progress toward goals: Progressing adequately    Interval History: Pt shared her story with a new group member. She discussed the way skills she is learning on the ABU are helping her cope with dysfunctional fx dynamics around her mother's illness.     Diagnosis: alcohol use disorder, severe, dependence    Plan: Continue treatment on ABU

## 2018-04-16 NOTE — PROGRESS NOTES
Group Psychotherapy (PhD/LCSW)    Site: [facility]    Clinical status of patient: Intensive Outpatient Program (IOP)    Date: 4/16/2018    Group Focus: Communication Skills       Length of service: 94348 - 45-50 minutes    Number of patients in attendance: 16    Referred by: Addictive Behavior Unit Treatment Team    Target symptoms: Alcohol Abuse    Patient's response to treatment: Active Listening      Progress toward goals: Progressing adequately    Interval History: Discussed basic communication skills: I-messages; Reflective Listening; Contextual considerations. Demonstrated their value and limitations as applied to communication issues currently affecting group members. Modeled and role played their use.    Diagnosis: alcohol use disorder, severe, dependence    Plan: Continue treatment on ABU

## 2018-04-16 NOTE — PLAN OF CARE
04/16/18 1000   Activity/Group Therapy Checklist   Group Relapse Prevention   Attendance Attended   Follows Direction Followed directions   Group Interactions/Observations Interacted appropriately   Affect/Mood Range Normal range   Affect/Mood Display Appropriate   Goal Progression Progressing

## 2018-04-17 ENCOUNTER — HOSPITAL ENCOUNTER (OUTPATIENT)
Dept: PSYCHIATRY | Facility: HOSPITAL | Age: 43
Discharge: HOME | End: 2018-04-17
Attending: PSYCHIATRY & NEUROLOGY
Payer: COMMERCIAL

## 2018-04-17 DIAGNOSIS — F10.20 ALCOHOL USE DISORDER, SEVERE, DEPENDENCE: Primary | ICD-10-CM

## 2018-04-17 DIAGNOSIS — F33.2 MAJOR DEPRESSIVE DISORDER, RECURRENT SEVERE WITHOUT PSYCHOTIC FEATURES: ICD-10-CM

## 2018-04-17 LAB
7AMINOCLONAZEPAM UR CFM-MCNC: NEGATIVE NG/ML
7AMINOFLUNITRAZEPAM UR CFM-MCNC: NEGATIVE NG/ML
A-OH ALPRAZ UR CFM-MCNC: NEGATIVE NG/ML
A-OH-TRIAZOLAM UR CFM-MCNC: NEGATIVE NG/ML
BENZODIAZEPINE CONF CHAIN OF CUSTODY: NORMAL
BREATH ALCOHOL: 0
LORAZEPAM UR CFM-MCNC: NEGATIVE NG/ML
NORDIAZEPAM UR CFM-MCNC: 106 NG/ML
OH-ETHYLFLURAZ UR CFM-MCNC: NEGATIVE NG/ML
OXAZEPAM UR CFM-MCNC: 1132 NG/ML
TEMAZEPAM UR CFM-MCNC: 333 NG/ML
TOXICOLOGIST REVIEW: NORMAL

## 2018-04-17 PROCEDURE — 90853 GROUP PSYCHOTHERAPY: CPT

## 2018-04-17 PROCEDURE — 90853 GROUP PSYCHOTHERAPY: CPT | Mod: ,,, | Performed by: PSYCHOLOGIST

## 2018-04-17 PROCEDURE — 90853 GROUP PSYCHOTHERAPY: CPT | Performed by: SOCIAL WORKER

## 2018-04-17 NOTE — PROGRESS NOTES
Group Psychotherapy (PhD/LCSW)    Site: [facility]    Clinical status of patient: Intensive Outpatient Program (IOP)    Date: 4/17/2018    Group Focus: Psychodynamic Group Psychotherapy    Length of service: 19123 - 45-50 minutes    Number of patients in attendance: 8    Referred by: Addictive Behavior Unit Treatment Team    Target symptoms: Alcohol Abuse    Patient's response to treatment: Active Listening and Self-disclosure    Progress toward goals: Progressing adequately    Interval History: Discussed needing to find things to do in spare time.    Diagnosis: alcohol use disorder, severe, dependence    Plan: Continue treatment on ABU

## 2018-04-17 NOTE — PROGRESS NOTES
Group Psychotherapy (MD)     Site: [facility]     Clinical status of patient: Intensive Outpatient Program (IOP)     Date: 4/11/17     Group Focus: Medical and Neuropsychiatric Bases of Psychiatric Disease and Addiction: pain     Length of service: 27802 - 45-50 minutes     Number of patients in attendance: 9     Referred by: Addictive Behavioral Unit Treatment Team     Target symptoms: Pain     Patient's response to treatment: Active Listening       Progress toward goals: Reviewed medical model of mood and anxiety disorders and substance abuse, encouraged view of psychiatric disease through this model to alleviate stigma and to identify helpful behaviors. Discussed pain in detail, including neurological, medical and psychological aspects. Discussed subjective nature of pain as well as multiple factors that play into pain. Utilized this information to brainstorm ways pain can be treated outside of taking pain medications. Highlighted risk of opioid use.      Diagnosis: Alcohol Use disorder, severe, dependence     Plan: Continue treatment on ABU

## 2018-04-17 NOTE — PROGRESS NOTES
Group Psychotherapy (PhD/LCSW)    Site: [facility]    Clinical status of patient: Intensive Outpatient Program (IOP)    Date: 4/17/2018    Group Focus: Disease Model of Addiction    Length of service: 09900 - 45-50 minutes    Number of patients in attendance: 8    Referred by: Addictive Behavior Unit Treatment Team    Target symptoms: Alcohol Abuse    Patient's response to treatment: Active Listening and Self-disclosure    Progress toward goals: Progressing adequately    Interval History: Discussed the basic neuropsychological concepts of the Disease Model of Addiction and their relationship to the phenomena of addiction and recovery (eg, powerlessness; cravings; relapse; etc). Noted the way the Disease concept comports with AA and  value of the Disease Model for sustaining long-term sobriety.     Diagnosis: alcohol use disorder, severe, dependence    Plan: Continue treatment on ABU

## 2018-04-17 NOTE — PLAN OF CARE
04/17/18 1400   Activity/Group Therapy Checklist   Group Goals/Reflection  (life story )   Attendance Attended   Follows Direction Followed directions   Group Interactions/Observations Interacted appropriately   Affect/Mood Range Normal range   Affect/Mood Display Appropriate   Goal Progression Progressing

## 2018-04-17 NOTE — PROGRESS NOTES
Group Psychotherapy (PhD/LCSW)    Site: [facility]    Clinical status of patient: Intensive Outpatient Program (IOP)    Date: 4/17/2018    Group Focus: DBT-Based Group Psychotherapy    Length of service: 79553 - 45-50 minutes    Number of patients in attendance: 13    Referred by: Addictive Behavior Unit Treatment Team    Target symptoms: Alcohol Abuse    Patient's response to treatment: Active Listening and Self-disclosure    Progress toward goals: Progressing adequately    Interval History: Session focus was Emotion Regulation:  ABC PLEASE.  Patients were encouraged to reduce vulnerability to distress by accumulating positive emotions, building mastery, coping ahead, and by attending to physical well-being.  Each patient identified a way to accumulate positive emotions.     Diagnosis: alcohol use disorder, severe, dependence    Plan: Continue treatment on ABU

## 2018-04-18 ENCOUNTER — HOSPITAL ENCOUNTER (OUTPATIENT)
Dept: PSYCHIATRY | Facility: HOSPITAL | Age: 43
Discharge: HOME | End: 2018-04-18
Attending: PSYCHIATRY & NEUROLOGY
Payer: COMMERCIAL

## 2018-04-18 VITALS — HEART RATE: 74 BPM | DIASTOLIC BLOOD PRESSURE: 75 MMHG | RESPIRATION RATE: 16 BRPM | SYSTOLIC BLOOD PRESSURE: 101 MMHG

## 2018-04-18 DIAGNOSIS — F10.20 ALCOHOL USE DISORDER, SEVERE, DEPENDENCE: Primary | ICD-10-CM

## 2018-04-18 DIAGNOSIS — F33.2 MAJOR DEPRESSIVE DISORDER, RECURRENT SEVERE WITHOUT PSYCHOTIC FEATURES: ICD-10-CM

## 2018-04-18 LAB
AMPHET+METHAMPHET UR QL: NEGATIVE
BARBITURATES UR QL SCN>200 NG/ML: NEGATIVE
BENZODIAZ UR QL SCN>200 NG/ML: NORMAL
BREATH ALCOHOL: 0
BZE UR QL SCN: NEGATIVE
CANNABINOIDS UR QL SCN: NEGATIVE
CREAT UR-MCNC: 18 MG/DL
ETHANOL UR-MCNC: <10 MG/DL
ETHYL GLUCURONIDE: NEGATIVE
METHADONE UR QL SCN>300 NG/ML: NEGATIVE
OPIATES UR QL SCN: NEGATIVE
PCP UR QL SCN>25 NG/ML: NEGATIVE
TOXICOLOGY INFORMATION: NORMAL

## 2018-04-18 PROCEDURE — 80307 DRUG TEST PRSMV CHEM ANLYZR: CPT

## 2018-04-18 PROCEDURE — 90853 GROUP PSYCHOTHERAPY: CPT | Mod: ,,, | Performed by: PSYCHOLOGIST

## 2018-04-18 PROCEDURE — 90853 GROUP PSYCHOTHERAPY: CPT | Performed by: SOCIAL WORKER

## 2018-04-18 PROCEDURE — 99232 SBSQ HOSP IP/OBS MODERATE 35: CPT | Mod: ,,, | Performed by: PSYCHIATRY & NEUROLOGY

## 2018-04-18 PROCEDURE — 90853 GROUP PSYCHOTHERAPY: CPT | Mod: ,,, | Performed by: PSYCHIATRY & NEUROLOGY

## 2018-04-18 PROCEDURE — 90853 GROUP PSYCHOTHERAPY: CPT

## 2018-04-18 RX ORDER — VENLAFAXINE HYDROCHLORIDE 75 MG/1
75 CAPSULE, EXTENDED RELEASE ORAL DAILY
Qty: 30 CAPSULE | Refills: 0 | Status: SHIPPED | OUTPATIENT
Start: 2018-04-18 | End: 2018-05-01

## 2018-04-18 NOTE — PROGRESS NOTES
Group Psychotherapy (PhD/LCSW)    Site: [facility]    Clinical status of patient: Intensive Outpatient Program (IOP)    Date: 4/18/2018    Group Focus: Psychodynamic Group Psychotherapy    Length of service: 23615 - 45-50 minutes    Number of patients in attendance: 8    Referred by: Addictive Behavior Unit Treatment Team    Target symptoms: Alcohol Abuse    Patient's response to treatment: Active Listening and Self-disclosure    Progress toward goals: Progressing adequately    Interval History: Discussed  not coming for family day yesterday. She handled this relatively well. He will come next week.    Diagnosis: alcohol use disorder, severe, dependence    Plan: Continue treatment on ABU

## 2018-04-18 NOTE — PROGRESS NOTES
Group Psychotherapy (PhD/LCSW)    Site: [facility]    Clinical status of patient: Intensive Outpatient Program (IOP)    Date: 4/18/2018    Group Focus: CBT-Based Group Psychotherapy    Length of service: 62828 - 45-50 minutes    Number of patients in attendance: 13    Referred by: Addictive Behavior Unit Treatment Team    Target symptoms: Alcohol Abuse    Patient's response to treatment: Active Listening and Self-disclosure    Progress toward goals: Progressing adequately    Interval History: Session focus was Sleep Hygiene.  Patients were provided with sleep hygiene strategies and instructions for calm breathing, setting up a bedtime routine, and having a thinking/worry hour in order to facilitate efficient and restful sleep.    Diagnosis: alcohol use disorder, severe, dependence    Plan: Continue treatment on ABU

## 2018-04-18 NOTE — PLAN OF CARE
04/18/18 1400   Activity/Group Therapy Checklist   Group Goals/Reflection  (perception of self )   Attendance Attended   Follows Direction Followed directions   Group Interactions/Observations Interacted appropriately   Affect/Mood Range Normal range   Affect/Mood Display Appropriate   Goal Progression Progressing

## 2018-04-18 NOTE — PATIENT CARE CONFERENCE
Alcohol use disorder, severe  Substance-induced mood disorder     1. Pt is attending all groups    2. Pt is attending all meetings  3. Pt 's has minimally supportive family  4. Pt has completed spiritual assessment    5. Pt will present life story    6. Pt will present Step One assignment    7. Pt is exploring issues related to relapse  prevention; spirituality; stress management; improved communication skills; assertiveness training; poor self-esteem; disease concepts; cross addictions; and, work related issues    8. D/C date: TBD     Staff discussed pt's improved affect and concern with pt's emotional well being. Staff discussed pt's strained relationship with sister and increased stress related to not being able to see dying mother. Staff discussed pt possibly still grieving father's death. Staff discussed psychosocial hx related to pt's career and relationship with . Staff discussed pt 's absence at family day this week and possible avoidance in attending. Staff discussed pt needing to obtain a sponsor and complete first step and life story.  Staff discussed pt possible dual dx regarding anxiety and substance use. Staff discussed pt need to add structure in life during pt's free time and develop coping skills to handle emotions. Staff discussed pt's high risk for relapse and benefit in tx for 30 days.    Problem: Alcohol use Disorder, Severe  Goal: Address in 12 step meetings and group and individual sessions    Objective Measure: participation in groups, self report, length of sobriety, and relapse prevention plan  Time: Prior to discharge    Progress: Pt is attending groups and sessions     Problem: Substance-induced mood disorder   Goal: Address in 12 step meetings and group and individual sessions    Objective Measure: participation in groups, self report, length of sobriety, and relapse prevention plan  Time: Prior to discharge    Progress: Pt is attending groups and sessions        Staff  members present:    MD Dr. Gerald Madrigal MD, Resident  Dr. Macarena Sandra MD, Resident  Dr. Simons, Ph.D.  José Miguel Brennan, GISELLAW  GISELLA RajanW  Devora King RN

## 2018-04-18 NOTE — PROGRESS NOTES
"Addiction Psychiatry Progress Note    ENCOUNTER DATE: 4/18/2018  SITE: [facility], [facility]    CHIEF COMPLAINT : alcohol use disorder  Patient is a 42 y.o. female, admitted on 4/5/2018 with principal problem of alcohol use disorder    SUBJECTIVE  The patient notes that she slept well last night and did not struggle with vivid dreams as she had in the past few nights. She notes that she has been having trouble filling the time between the end f group at [facility] and attending the 8 PM AA meeting. She notes that she started meditating yesterday and adds that she plans to go to the park with her dogs. She notes that she is also interested in doing kickboxing and starting an exercise regimen. We plan for her to make a list of potential hobbies. She notes that she has emailed her work regarding her leave. We plan to increase her Effexor ER to 75 mg PO daily for mood and anxiety.     Patient has been sleeping well. Appetite good.    Mood is "good." Denies SI/HI/AH/VH.    ROS  Denies any medical complaints    EXAMINATION    VITALS   Vitals:    04/18/18 1029   BP: 101/75   Pulse: 74   Resp: 16     Labs  4/16/18: Urine drug test positive for benzos only  4/16/18: BAL negative.  Ethyl glucuronide 4/11 negative.        CONSTITUTIONAL  General Appearance: age appropriate, neatly groomed, thin & gaunt looking    PSYCHIATRIC   Mental Status Exam:  Behavior/Cooperation: normal, cooperative  Speech: normal tone, normal rate, normal pitch, normal volume  Mood: "ok"  Affect: more constricted again today  Thought Process: normal and logical  Thought Content: normal, no suicidality, no homicidality, delusions, or paranoia  Orientation: grossly intact  Memory: Grossly intact  Attention Span/Concentration: Normal  Insight: fair  Judgment: fair    MEDICAL DECISION MAKING    Diagnosis:  Alcohol use disorder-severe  Substance-induced mood disorder     Plan:  1. Continue patient on ABU protocol.  2.  Breathalyzer daily and " Urine toxicology three times a week.  3.  VS daily 3 days a week  4.  Patient counseled on continuing cessation from alcohol.  5. Patient previously counseled multiple times on reporting to the physician health program in each state of licensure. Also counseled that each PHP may have different requirements which may include more rigorous treatment including residential rehab to continue licensure.  6. Discontinued Valium on 4/5/18  7. Increased to gabapentin 300mg TID  8. Increase Effexor ER 75 mg daily for mood and anxiety  9. will order alcohol biomarkers - urinary ETG and ETS; Alcohol biomarkers from last week negative.    Garrick Klaybor, MD [facility] Resident

## 2018-04-18 NOTE — PROGRESS NOTES
Group Psychotherapy (MD)     Site: [facility]     Clinical status of patient: Intensive Outpatient Program (IOP)     Date: 4/18/18     Group Focus: Medical and Neuropsychiatric Bases of Psychiatric Disease and   Addiction: Self medication     Length of service: 75889 - 45-50 minutes     Number of patients in attendance: 11     Referred by: Addictive Behavioral Unit Treatment Team     Target symptoms: Anxiety, depression, cravings and other negative symptoms     Patient's response to treatment: Active Listening       Progress toward goals: progressing adequately    content of Therapy: Provided education about addictive circuits in the brain and   relationship with other mental health issues. Explained concept of self medication   and contrasted it with self care, discussed hazards of short acting quick fixes   for psychiatric issues and benefits of long term sustainable solutions. Prompted   examples of addictive, poor coping skills and brainstormed alternative more   helpful behaviors, such as mindfulness, therapy, exercise.           Diagnosis: Alcohol use disorder, severe     Plan: Continue treatment on ABU

## 2018-04-19 ENCOUNTER — HOSPITAL ENCOUNTER (OUTPATIENT)
Dept: PSYCHIATRY | Facility: HOSPITAL | Age: 43
Discharge: HOME | End: 2018-04-19
Attending: PSYCHIATRY & NEUROLOGY
Payer: COMMERCIAL

## 2018-04-19 DIAGNOSIS — F33.2 MAJOR DEPRESSIVE DISORDER, RECURRENT SEVERE WITHOUT PSYCHOTIC FEATURES: ICD-10-CM

## 2018-04-19 DIAGNOSIS — F10.20 ALCOHOL USE DISORDER, SEVERE, DEPENDENCE: Primary | ICD-10-CM

## 2018-04-19 LAB — BREATH ALCOHOL: 0

## 2018-04-19 PROCEDURE — 90853 GROUP PSYCHOTHERAPY: CPT | Mod: ,,, | Performed by: PSYCHOLOGIST

## 2018-04-19 PROCEDURE — 90853 GROUP PSYCHOTHERAPY: CPT | Performed by: SOCIAL WORKER

## 2018-04-19 PROCEDURE — 90853 GROUP PSYCHOTHERAPY: CPT

## 2018-04-19 NOTE — PROGRESS NOTES
Group Psychotherapy (PhD/LCSW)    Site: [facility]    Clinical status of patient: Intensive Outpatient Program (IOP)    Date: 4/19/2018    Group Focus: Psychodynamic Group Psychotherapy    Length of service: 79200 - 45-50 minutes    Number of patients in attendance: 7    Referred by: Addictive Behavior Unit Treatment Team    Target symptoms: Alcohol Abuse    Patient's response to treatment: Active Listening and Self-disclosure    Progress toward goals: Progressing adequately    Interval History: Reported things are going well for today.    Diagnosis: alcohol use disorder, severe, dependence    Plan: Continue treatment on ABU

## 2018-04-19 NOTE — PLAN OF CARE
04/19/18 1000   Activity/Group Therapy Checklist   Group Educational  (healthy anger )   Attendance Attended   Follows Direction Followed directions   Group Interactions/Observations Interacted appropriately   Affect/Mood Range Normal range   Affect/Mood Display Appropriate   Goal Progression Progressing

## 2018-04-19 NOTE — PROGRESS NOTES
Group Psychotherapy (PhD/LCSW)    Site: [facility]    Clinical status of patient: Intensive Outpatient Program (IOP)    Date: 4/19/2018    Group Focus: DBT-Based Group Psychotherapy    Length of service: 97984 - 45-50 minutes    Number of patients in attendance: 12    Referred by: Addictive Behavior Unit Treatment Team    Target symptoms: Alcohol Abuse    Patient's response to treatment: Active Listening and Self-disclosure    Progress toward goals: Progressing adequately    Interval History: Session focus was Distress Tolerance:  STOP and Self-Soothe.  Patients were encouraged to use crisis survival skills to reduce intensity of distress.  Each patient identified something soothing for all five senses.    Diagnosis: alcohol use disorder, severe, dependence    Plan: Continue treatment on ABU

## 2018-04-20 ENCOUNTER — HOSPITAL ENCOUNTER (OUTPATIENT)
Dept: PSYCHIATRY | Facility: HOSPITAL | Age: 43
Discharge: HOME | End: 2018-04-20
Attending: PSYCHIATRY & NEUROLOGY
Payer: COMMERCIAL

## 2018-04-20 VITALS — RESPIRATION RATE: 16 BRPM | HEART RATE: 90 BPM | DIASTOLIC BLOOD PRESSURE: 72 MMHG | SYSTOLIC BLOOD PRESSURE: 100 MMHG

## 2018-04-20 DIAGNOSIS — F10.20 ALCOHOL USE DISORDER, SEVERE, DEPENDENCE: Primary | ICD-10-CM

## 2018-04-20 DIAGNOSIS — F33.2 MAJOR DEPRESSIVE DISORDER, RECURRENT SEVERE WITHOUT PSYCHOTIC FEATURES: ICD-10-CM

## 2018-04-20 LAB
AMPHET+METHAMPHET UR QL: NEGATIVE
BARBITURATES UR QL SCN>200 NG/ML: NEGATIVE
BENZODIAZ UR QL SCN>200 NG/ML: NORMAL
BREATH ALCOHOL: 0
BZE UR QL SCN: NEGATIVE
CANNABINOIDS UR QL SCN: NEGATIVE
CREAT UR-MCNC: 33 MG/DL
ETHANOL UR-MCNC: <10 MG/DL
METHADONE UR QL SCN>300 NG/ML: NEGATIVE
OPIATES UR QL SCN: NEGATIVE
PCP UR QL SCN>25 NG/ML: NEGATIVE
TOXICOLOGY INFORMATION: NORMAL

## 2018-04-20 PROCEDURE — 99232 SBSQ HOSP IP/OBS MODERATE 35: CPT | Mod: ,,, | Performed by: PSYCHIATRY & NEUROLOGY

## 2018-04-20 PROCEDURE — 90853 GROUP PSYCHOTHERAPY: CPT | Mod: ,,, | Performed by: PSYCHOLOGIST

## 2018-04-20 PROCEDURE — 90853 GROUP PSYCHOTHERAPY: CPT | Mod: 59,,, | Performed by: PSYCHOLOGIST

## 2018-04-20 PROCEDURE — 90853 GROUP PSYCHOTHERAPY: CPT | Performed by: SOCIAL WORKER

## 2018-04-20 PROCEDURE — 80307 DRUG TEST PRSMV CHEM ANLYZR: CPT

## 2018-04-20 PROCEDURE — 90853 GROUP PSYCHOTHERAPY: CPT

## 2018-04-20 NOTE — PROGRESS NOTES
Group Psychotherapy (PhD/LCSW)    Site: [facility]    Clinical status of patient: Intensive Outpatient Program (IOP)    Date: 4/20/2018    Group Focus: Stress Management    Length of service: 39899 - 45-50 minutes    Number of patients in attendance: 12    Referred by: Addictive Behavior Unit Treatment Team    Target symptoms: Alcohol Abuse    Patient's response to treatment: Active Listening    Progress toward goals: Progressing adequately    Interval History: Group learned mindfulness techniques (breath, sound) to improve present-moment awareness, impulsive behavior tendencies, and tolerance of various emotional states.    Diagnosis: Alcohol Use Disorder    Plan: Continue treatment on ABU

## 2018-04-20 NOTE — PROGRESS NOTES
Group Psychotherapy (PhD/LCSW)    Site: [facility]    Clinical status of patient: Intensive Outpatient Program (IOP)    Date: 4/20/2018    Group Focus: The Dynamics of Relationship       Length of service: 37784 - 45-50 minutes    Number of patients in attendance: 12    Referred by: Addictive Behavior Unit Treatment Team    Target symptoms: Alcohol Abuse    Patient's response to treatment: Active Listening    Progress toward goals: Progressing adequately    Interval History: Discussed the way unresolved fx of origin issues from the past can create problems in current relationship dynamics. Gave examples of same and demonstrated how use this insight to enhance current relationship dynamics.     Diagnosis: Alcohol Use Disorder    Plan: Continue treatment on ABU

## 2018-04-20 NOTE — PROGRESS NOTES
Group Psychotherapy (PhD/LCSW)    Site: [facility]    Clinical status of patient: Intensive Outpatient Program (IOP)    Date: 4/20/2018    Group Focus: Psychodynamic Group Psychotherapy    Length of service: 91193 - 45-50 minutes    Number of patients in attendance: 7    Referred by: Addictive Behavior Unit Treatment Team    Target symptoms: Alcohol Abuse    Patient's response to treatment: Active Listening and Self-disclosure    Progress toward goals: Progressing adequately    Interval History:  Discussed her discomfort in talking with spouse about the effects of his drinking on her recovery. Noted that he tends to hide it when he drinks which is more of a problem for her than the drinking itself.     Diagnosis: alcohol use disorder, severe, dependence    Plan: Continue treatment on ABU

## 2018-04-20 NOTE — PLAN OF CARE
04/20/18 1400   Activity/Group Therapy Checklist   Group Relapse Prevention   Attendance Attended   Follows Direction Followed directions   Group Interactions/Observations Interacted appropriately   Affect/Mood Range Normal range   Affect/Mood Display Appropriate   Goal Progression Progressing

## 2018-04-20 NOTE — PROGRESS NOTES
"Addiction Psychiatry Progress Note    ENCOUNTER DATE: 4/20/2018  SITE: [facility]    CHIEF COMPLAINT : alcohol use disorder  Patient is a 42 y.o. female, admitted on 4/5/2018 with principal problem of alcohol use disorder    SUBJECTIVE  Patient has been sleeping well. Appetite good-went through sugar craving for about a week, but it has subsided.    Mood is "good." Denies SI/HI/AH/VH.    Does not note any differences since increasing the Effexor; denies any side effects of the medications.    Has not reached out to her sister about meeting with her mom again. Says she is at peace with the state that she last saw her mother in.    Endorses continued sobriety. Denies any alcohol cravings. Attends daily AA meetings.    ROS  Denies any medical complaints    EXAMINATION    VITALS   Vitals:    04/20/18 1054   BP: 100/72   Pulse: 90   Resp: 16     Labs  4/16/18: Urine drug test positive for benzos only  4/16/18: BAL negative.  Ethyl glucuronide 4/11 negative.        CONSTITUTIONAL  General Appearance: age appropriate, neatly groomed, thin & gaunt looking    PSYCHIATRIC   Mental Status Exam:  Behavior/Cooperation: normal, cooperative  Speech: normal tone, normal rate, normal pitch, normal volume  Mood: "good"  Affect: blunted  Thought Process: normal and logical  Thought Content: normal, no suicidality, no homicidality, delusions, or paranoia  Orientation: grossly intact  Memory: Grossly intact  Attention Span/Concentration: Normal  Insight: fair  Judgment: fair    MEDICAL DECISION MAKING    Diagnosis:  Alcohol use disorder-severe  Substance-induced mood disorder     Plan:  1. Continue patient on ABU protocol.  2.  Breathalyzer daily and Urine toxicology three times a week.  3.  VS daily 3 days a week  4.  Patient counseled on continuing cessation from alcohol.  5. Patient previously counseled multiple times on reporting to the physician health program in each state of licensure. Also counseled " that each PHP may have different requirements which may include more rigorous treatment including residential rehab to continue licensure.  6. Discontinued Valium on 4/5/18  7. Continue gabapentin 300mg TID  8. Continue Effexor ER 75 mg daily for mood and anxiety    Allyson Darga, MD [facility] Psychiatry Resident

## 2018-04-24 ENCOUNTER — HOSPITAL ENCOUNTER (OUTPATIENT)
Dept: PSYCHIATRY | Facility: HOSPITAL | Age: 43
Discharge: HOME | End: 2018-04-24
Attending: PSYCHIATRY & NEUROLOGY
Payer: COMMERCIAL

## 2018-04-24 DIAGNOSIS — F10.20 ALCOHOL USE DISORDER, SEVERE, DEPENDENCE: Primary | ICD-10-CM

## 2018-04-24 DIAGNOSIS — F33.2 MAJOR DEPRESSIVE DISORDER, RECURRENT SEVERE WITHOUT PSYCHOTIC FEATURES: ICD-10-CM

## 2018-04-24 PROCEDURE — 99232 SBSQ HOSP IP/OBS MODERATE 35: CPT | Mod: ,,, | Performed by: PSYCHIATRY & NEUROLOGY

## 2018-04-24 PROCEDURE — 90853 GROUP PSYCHOTHERAPY: CPT | Mod: ,,, | Performed by: PSYCHOLOGIST

## 2018-04-24 PROCEDURE — 90853 GROUP PSYCHOTHERAPY: CPT | Performed by: SOCIAL WORKER

## 2018-04-24 PROCEDURE — 90853 GROUP PSYCHOTHERAPY: CPT

## 2018-04-24 NOTE — PLAN OF CARE
04/24/18 1400   Activity/Group Therapy Checklist   Group Goals/Reflection  (incomplete prompts )   Attendance Attended   Follows Direction Followed directions   Group Interactions/Observations Interacted appropriately   Affect/Mood Range Normal range   Affect/Mood Display Appropriate   Goal Progression Progressing

## 2018-04-24 NOTE — PROGRESS NOTES
Group Psychotherapy (PhD/LCSW)    Site: [facility]    Clinical status of patient: Intensive Outpatient Program (IOP)    Date: 4/24/2018    Group Focus: Psychodynamic Group Psychotherapy    Length of service: 30380 - 45-50 minutes    Number of patients in attendance: 6    Referred by: Addictive Behavior Unit Treatment Team    Target symptoms: Alcohol Abuse    Patient's response to treatment: Active Listening and Self-disclosure    Progress toward goals: Progressing adequately    Interval History: Shared her story with new group member.    Diagnosis: alcohol use disorder, severe, dependence    Plan: Continue treatment on ABU

## 2018-04-24 NOTE — PROGRESS NOTES
"Addiction Psychiatry Progress Note    ENCOUNTER DATE: 4/24/2018  SITE: [facility]    CHIEF COMPLAINT : alcohol use disorder  Patient is a 42 y.o. female, admitted on 4/5/2018 with principal problem of alcohol use disorder    SUBJECTIVE  The patient notes increased anxiety today. She is able to attribute this anxiety to her  being present and some issues with her work that seem vague. The patient's  describes himself as an "alcohol and drug abuser." Also, during the interview the pt's  displays a strange affect which appears as if he is under the influence of a sedative/hypnotic. He notes that he is prescribed benzodiazepines, but it's unclear if he took one of these medications this morning. The patient notes that this is the first day that she notices high anxiety without having her prescription of benzodiazepines readily available. The patient notes that she is maintaining her sobriety, but it's unclear the level of health support she has outside of the treatment environment. The patient's  doesn't speak much during the interview and has very limited questions.     Overall, the patient denies SI/HI/AH/VH. Endorses continued sobriety. Denies any alcohol cravings. Attends daily AA meetings.    ROS  Denies any medical complaints    EXAMINATION    VITALS   There were no vitals filed for this visit.  Labs  4/16/18: Urine drug test positive for benzos only  4/16/18: BAL negative.  Ethyl glucuronide 4/11 negative.        CONSTITUTIONAL  General Appearance: age appropriate, neatly groomed, thin & gaunt looking    PSYCHIATRIC   Mental Status Exam:  Behavior/Cooperation: normal, cooperative  Speech: normal tone, normal rate, normal pitch, normal volume  Mood: "good"  Affect: blunted  Thought Process: normal and logical  Thought Content: normal, no suicidality, no homicidality, delusions, or paranoia  Orientation: grossly intact  Memory: Grossly intact  Attention " Span/Concentration: Normal  Insight: fair  Judgment: fair    MEDICAL DECISION MAKING    Diagnosis:  Alcohol use disorder-severe  Substance-induced mood disorder     Plan:  1. Continue patient on ABU protocol.  2.  Breathalyzer daily and Urine toxicology three times a week.  3.  VS daily 3 days a week  4.  Patient counseled on continuing cessation from alcohol.  5. Patient previously counseled multiple times on reporting to the physician health program in each state of licensure. Also counseled that each PHP may have different requirements which may include more rigorous treatment including residential rehab to continue licensure.  6. Discontinued Valium on 4/5/18  7. Continue gabapentin 300mg TID  8. Continue Effexor ER 75 mg daily for mood and anxiety    Garrick Klaybor, MD [facility] Psychiatry Resident

## 2018-04-24 NOTE — PROGRESS NOTES
Group Psychotherapy (PhD/LCSW)    Site: [facility]    Clinical status of patient: Intensive Outpatient Program (IOP)    Date: 4/24/2018    Group Focus: DBT-Based Group Psychotherapy    Length of service: 40710 - 45-50 minutes    Number of patients in attendance: 10    Referred by: Addictive Behavior Unit Treatment Team    Target symptoms: Alcohol Abuse    Patient's response to treatment: Active Listening and Self-disclosure    Progress toward goals: Progressing adequately    Interval History: Session focus was Distress Tolerance:  TIP skill.  Patients were encouraged to use temperature, intense exercise, paced breathing, or paired muscle relaxation to reduce intensity of distress.    Diagnosis: alcohol use disorder, severe, dependence    Plan: Continue treatment on ABU

## 2018-04-25 ENCOUNTER — HOSPITAL ENCOUNTER (OUTPATIENT)
Dept: PSYCHIATRY | Facility: HOSPITAL | Age: 43
Discharge: HOME | End: 2018-04-25
Attending: PSYCHIATRY & NEUROLOGY
Payer: COMMERCIAL

## 2018-04-25 VITALS — DIASTOLIC BLOOD PRESSURE: 81 MMHG | HEART RATE: 94 BPM | SYSTOLIC BLOOD PRESSURE: 109 MMHG | RESPIRATION RATE: 16 BRPM

## 2018-04-25 DIAGNOSIS — F10.20 ALCOHOL USE DISORDER, SEVERE, DEPENDENCE: Primary | ICD-10-CM

## 2018-04-25 DIAGNOSIS — F33.2 MAJOR DEPRESSIVE DISORDER, RECURRENT SEVERE WITHOUT PSYCHOTIC FEATURES: ICD-10-CM

## 2018-04-25 LAB
AMPHET+METHAMPHET UR QL: NEGATIVE
BARBITURATES UR QL SCN>200 NG/ML: NEGATIVE
BENZODIAZ UR QL SCN>200 NG/ML: NEGATIVE
BREATH ALCOHOL: 0
BZE UR QL SCN: NEGATIVE
CANNABINOIDS UR QL SCN: NEGATIVE
CREAT UR-MCNC: 37 MG/DL
ETHANOL UR-MCNC: <10 MG/DL
METHADONE UR QL SCN>300 NG/ML: NEGATIVE
OPIATES UR QL SCN: NEGATIVE
PCP UR QL SCN>25 NG/ML: NEGATIVE
TOXICOLOGY INFORMATION: NORMAL

## 2018-04-25 PROCEDURE — 90853 GROUP PSYCHOTHERAPY: CPT | Performed by: SOCIAL WORKER

## 2018-04-25 PROCEDURE — 99232 SBSQ HOSP IP/OBS MODERATE 35: CPT | Mod: 25,,, | Performed by: PSYCHIATRY & NEUROLOGY

## 2018-04-25 PROCEDURE — 90853 GROUP PSYCHOTHERAPY: CPT | Mod: ,,, | Performed by: PSYCHIATRY & NEUROLOGY

## 2018-04-25 PROCEDURE — 90853 GROUP PSYCHOTHERAPY: CPT | Mod: ,,, | Performed by: PSYCHOLOGIST

## 2018-04-25 PROCEDURE — 90853 GROUP PSYCHOTHERAPY: CPT

## 2018-04-25 PROCEDURE — 80307 DRUG TEST PRSMV CHEM ANLYZR: CPT

## 2018-04-25 NOTE — PROGRESS NOTES
Group Psychotherapy (PhD/LCSW)    Site: [facility]    Clinical status of patient: Intensive Outpatient Program (IOP)    Date: 4/25/2018    Group Focus: Psychodynamic Group Psychotherapy    Length of service: 77423 - 45-50 minutes    Number of patients in attendance: 7    Referred by: Addictive Behavior Unit Treatment Team    Target symptoms: Alcohol Abuse    Patient's response to treatment: Active Listening and Self-disclosure    Progress toward goals: Progressing adequately    Interval History: Reports she feels better than she has in years due to sobriety and medication for anxiety.    Diagnosis: alcohol use disorder, severe, dependence    Plan: Continue treatment on ABU

## 2018-04-25 NOTE — PROGRESS NOTES
"Addiction Psychiatry Progress Note    ENCOUNTER DATE: 2018  SITE: [facility]    CHIEF COMPLAINT : alcohol use disorder  Patient is a 42 y.o. female, admitted on 2018 with principal problem of alcohol use disorder    SUBJECTIVE  The patient notes that she had a lot of anxiety with her  coming to group yesterday. She notes that her entire routine was different and caused her to have more anxiety. She notes that last night her  was told that a close friend was started in hospice. She is worried about this friend dying in the near future and herself having to leave town to attend the . We discuss the concept in recovery of "future tripping" and she is urged to live life in the present. The patient agrees with this plan.     Overall, the patient denies SI/HI/AH/VH. Endorses continued sobriety. Denies any alcohol cravings. Attends daily AA meetings.    ROS  Denies any medical complaints    EXAMINATION    VITALS   There were no vitals filed for this visit.  Labs  18: Urine drug test positive for benzos only  18: BAL negative.  Ethyl glucuronide  negative.        CONSTITUTIONAL  General Appearance: age appropriate, neatly groomed, thin & gaunt looking    PSYCHIATRIC   Mental Status Exam:  Behavior/Cooperation: normal, cooperative  Speech: normal tone, normal rate, normal pitch, normal volume  Mood: "good"  Affect: blunted  Thought Process: normal and logical  Thought Content: normal, no suicidality, no homicidality, delusions, or paranoia  Orientation: grossly intact  Memory: Grossly intact  Attention Span/Concentration: Normal  Insight: fair  Judgment: fair    MEDICAL DECISION MAKING    Diagnosis:  Alcohol use disorder-severe  Substance-induced mood disorder     Plan:  1. Continue patient on ABU protocol.  2.  Breathalyzer daily and Urine toxicology three times a week.  3.  VS daily 3 days a week  4.  Patient counseled on continuing cessation from " alcohol.  5. Patient previously counseled multiple times on reporting to the physician health program in each state of licensure. Also counseled that each PHP may have different requirements which may include more rigorous treatment including residential rehab to continue licensure.  6. Discontinued Valium on 4/5/18  7. Continue gabapentin 300mg TID  8. Continue Effexor ER 75 mg daily for mood and anxiety    Jose Duarte MD  [facility] Psychiatry Resident

## 2018-04-25 NOTE — PLAN OF CARE
04/25/18 1400   Activity/Group Therapy Checklist   Group Educational  (rigorous honesty )   Attendance Attended   Follows Direction Followed directions   Group Interactions/Observations Interacted appropriately   Affect/Mood Range Normal range   Affect/Mood Display Appropriate   Goal Progression Progressing

## 2018-04-25 NOTE — PATIENT CARE CONFERENCE
Alcohol use disorder, severe  Substance-induced mood disorder     1. Pt is attending all groups    2. Pt is attending all meetings  3. Pt 's has minimally supportive family  4. Pt has completed spiritual assessment    5. Pt will present life story    6. Pt will present Step One assignment    7. Pt is exploring issues related to relapse  prevention; spirituality; stress management; improved communication skills; assertiveness training; poor self-esteem; disease concepts; cross addictions; and, work related issues    8. D/C date: TBD     Staff discussed pt's spouse attending family day. Staff discussed meeting with spouse and minimizing his hx/o substance and drug abuse. Staff discussed spouse's report of self identifying with having problems with drugs and alcohol and denial of having addiction. Staff discussed spouse's report of pt's stress with mother's health. Staff discussed pt's stress with recent meeting with  and director. Staff discussed pt licensure and requirements by board. Staff discussed pt minimal processing of mother's health. Staff discussed pt's anxiety and med management with Effexor and gabapentin. Staff discussed pt candidacy for antabuse and accountability.     Problem: Alcohol use Disorder, Severe  Goal: Address in 12 step meetings and group and individual sessions    Objective Measure: participation in groups, self report, length of sobriety, and relapse prevention plan  Time: Prior to discharge    Progress: Pt is attending groups and sessions     Problem: Substance-induced mood disorder   Goal: Address in 12 step meetings and group and individual sessions    Objective Measure: participation in groups, self report, length of sobriety, and relapse prevention plan  Time: Prior to discharge    Progress: Pt is attending groups and sessions        Staff members present:    MD Dr. Neema Brito MD Dr. Klaybor MD, Resident  Dr. Macarena Sandra MD, Resident  Dr. Simons,  Ph.D.  Margie John, LCSW  José Miguel Brennan, LCSW  Vinay Reyez, LCSW  Devora King RN

## 2018-04-25 NOTE — PROGRESS NOTES
Group Psychotherapy (PhD/LCSW)    Site: [facility]    Clinical status of patient: Intensive Outpatient Program (IOP)    Date: 4/25/2018    Group Focus: DBT-Based Group Psychotherapy    Length of service: 66648 - 45-50 minutes    Number of patients in attendance: 13    Referred by: Addictive Behavior Unit Treatment Team    Target symptoms: Alcohol Abuse    Patient's response to treatment: Active Listening and Self-disclosure    Progress toward goals: Progressing adequately    Interval History:  Session focus was Distress Tolerance:  Distract with ACCEPTS.  Patients were encouraged to use activities, contributing, comparisons, different emotions, pushing away, other thoughts, and sensations to reduce intensity of distress.  Each patient identified unique ways to use ACCEPTS.    Diagnosis: alcohol use disorder, severe, dependence    Plan: Continue treatment on ABU

## 2018-04-26 ENCOUNTER — HOSPITAL ENCOUNTER (OUTPATIENT)
Dept: PSYCHIATRY | Facility: HOSPITAL | Age: 43
Discharge: HOME | End: 2018-04-26
Attending: PSYCHIATRY & NEUROLOGY
Payer: COMMERCIAL

## 2018-04-26 DIAGNOSIS — F33.2 MAJOR DEPRESSIVE DISORDER, RECURRENT SEVERE WITHOUT PSYCHOTIC FEATURES: ICD-10-CM

## 2018-04-26 DIAGNOSIS — F10.20 ALCOHOL USE DISORDER, SEVERE, DEPENDENCE: Primary | ICD-10-CM

## 2018-04-26 LAB — BREATH ALCOHOL: 0

## 2018-04-26 PROCEDURE — 90853 GROUP PSYCHOTHERAPY: CPT | Mod: ,,, | Performed by: PSYCHOLOGIST

## 2018-04-26 PROCEDURE — 99232 SBSQ HOSP IP/OBS MODERATE 35: CPT | Mod: ,,, | Performed by: PSYCHIATRY & NEUROLOGY

## 2018-04-26 PROCEDURE — 90853 GROUP PSYCHOTHERAPY: CPT | Performed by: SOCIAL WORKER

## 2018-04-26 PROCEDURE — 90853 GROUP PSYCHOTHERAPY: CPT

## 2018-04-26 NOTE — PROGRESS NOTES
"Addiction Psychiatry Progress Note    ENCOUNTER DATE: 4/26/2018  SITE: [facility]    CHIEF COMPLAINT : alcohol use disorder  Patient is a 42 y.o. female, admitted on 4/5/2018 with principal problem of alcohol use disorder    SUBJECTIVE  The patient notes that she is doing well and able to maintain her sobriety. We discuss potentially increasing her dose of Effexor in the near future, but plan to wait until she has had more time at the dose of 75 mg. She notes that she is overall pleased with the ABU program. We plan to continue her current treatment course at this time.    Overall, the patient denies SI/HI/AH/VH. Endorses continued sobriety. Denies any alcohol cravings. Attends daily AA meetings.    ROS  Denies any medical complaints    EXAMINATION    VITALS   There were no vitals filed for this visit.  Labs  4/16/18: Urine drug test positive for benzos only  4/16/18: BAL negative.  Ethyl glucuronide 4/11 negative.        CONSTITUTIONAL  General Appearance: age appropriate, neatly groomed, thin & gaunt looking    PSYCHIATRIC   Mental Status Exam:  Behavior/Cooperation: normal, cooperative  Speech: normal tone, normal rate, normal pitch, normal volume  Mood: "good"  Affect: blunted  Thought Process: normal and logical  Thought Content: normal, no suicidality, no homicidality, delusions, or paranoia  Orientation: grossly intact  Memory: Grossly intact  Attention Span/Concentration: Normal  Insight: fair  Judgment: fair    MEDICAL DECISION MAKING    Diagnosis:  Alcohol use disorder-severe  Substance-induced mood disorder     Plan:  1. Continue patient on ABU protocol.  2.  Breathalyzer daily and Urine toxicology three times a week.  3.  VS daily 3 days a week  4.  Patient counseled on continuing cessation from alcohol.  5. Patient previously counseled multiple times on reporting to the physician health program in each state of licensure. Also counseled that each PHP may have different " requirements which may include more rigorous treatment including residential rehab to continue licensure.  6. Discontinued Valium on 4/5/18  7. Continue gabapentin 300mg TID  8. Continue Effexor ER 75 mg daily for mood and anxiety    Garrick Klaybor, MD [facility] Psychiatry Resident

## 2018-04-26 NOTE — PLAN OF CARE
04/26/18 1400   Activity/Group Therapy Checklist   Group Relapse Prevention  (Step Work )   Attendance Attended   Follows Direction Followed directions   Group Interactions/Observations Interacted appropriately   Affect/Mood Range Normal range   Affect/Mood Display Appropriate   Goal Progression Progressing

## 2018-04-26 NOTE — PLAN OF CARE
04/26/18 1000   Activity/Group Therapy Checklist   Group Goals/Reflection  (life story )   Attendance Attended   Follows Direction Followed directions   Group Interactions/Observations Interacted appropriately   Affect/Mood Range Normal range   Affect/Mood Display Appropriate   Goal Progression Progressing

## 2018-04-26 NOTE — PROGRESS NOTES
Group Psychotherapy (PhD/LCSW)    Site: [facility]    Clinical status of patient: Intensive Outpatient Program (IOP)    Date: 4/26/2018    Group Focus: Psychodynamic Group Psychotherapy    Length of service: 38783 - 45-50 minutes    Number of patients in attendance: 7    Referred by: Addictive Behavior Unit Treatment Team    Target symptoms: Alcohol Abuse    Patient's response to treatment: Active Listening and Self-disclosure    Progress toward goals: Progressing adequately    Interval History: Discussed how she white-knuckle it through urges for benzos.    Diagnosis: alcohol use disorder, severe, dependence    Plan: Continue treatment on ABU

## 2018-04-26 NOTE — PROGRESS NOTES
Group Psychotherapy (PhD/LCSW)    Site: [facility]    Clinical status of patient: Intensive Outpatient Program (IOP)    Date: 4/26/2018    Group Focus: DBT-Based Group Psychotherapy    Length of service: 92567 - 45-50 minutes    Number of patients in attendance: 11    Referred by: Addictive Behavior Unit Treatment Team    Target symptoms: Alcohol Abuse    Patient's response to treatment: Active Listening and Self-disclosure    Progress toward goals: Progressing adequately    Interval History:  Session focus was Interpersonal Effectiveness:  DEAR MAN.  Patients were encouraged to exercise skillfulness during interactions by using this framework.    Diagnosis: alcohol use disorder, severe, dependence    Plan: Continue treatment on ABU

## 2018-04-27 ENCOUNTER — HOSPITAL ENCOUNTER (OUTPATIENT)
Dept: PSYCHIATRY | Facility: HOSPITAL | Age: 43
Discharge: HOME | End: 2018-04-27
Attending: PSYCHIATRY & NEUROLOGY
Payer: COMMERCIAL

## 2018-04-27 VITALS — RESPIRATION RATE: 16 BRPM | HEART RATE: 85 BPM | SYSTOLIC BLOOD PRESSURE: 105 MMHG | DIASTOLIC BLOOD PRESSURE: 80 MMHG

## 2018-04-27 DIAGNOSIS — F10.20 ALCOHOL USE DISORDER, SEVERE, DEPENDENCE: Primary | ICD-10-CM

## 2018-04-27 DIAGNOSIS — F33.2 MAJOR DEPRESSIVE DISORDER, RECURRENT SEVERE WITHOUT PSYCHOTIC FEATURES: ICD-10-CM

## 2018-04-27 LAB
AMPHET+METHAMPHET UR QL: NEGATIVE
BARBITURATES UR QL SCN>200 NG/ML: NEGATIVE
BENZODIAZ UR QL SCN>200 NG/ML: NEGATIVE
BREATH ALCOHOL: 0
BZE UR QL SCN: NEGATIVE
CANNABINOIDS UR QL SCN: NEGATIVE
CREAT UR-MCNC: 26 MG/DL
ETHANOL UR-MCNC: <10 MG/DL
METHADONE UR QL SCN>300 NG/ML: NEGATIVE
OPIATES UR QL SCN: NEGATIVE
PCP UR QL SCN>25 NG/ML: NEGATIVE
TOXICOLOGY INFORMATION: NORMAL

## 2018-04-27 PROCEDURE — 90853 GROUP PSYCHOTHERAPY: CPT | Mod: ,,, | Performed by: PSYCHOLOGIST

## 2018-04-27 PROCEDURE — 90853 GROUP PSYCHOTHERAPY: CPT | Performed by: SOCIAL WORKER

## 2018-04-27 PROCEDURE — 90853 GROUP PSYCHOTHERAPY: CPT

## 2018-04-27 PROCEDURE — 80307 DRUG TEST PRSMV CHEM ANLYZR: CPT

## 2018-04-27 NOTE — PLAN OF CARE
04/27/18 1112   Activity/Group Therapy Checklist   Group Life Skills  (Life balance)   Attendance Attended   Follows Direction Followed directions   Group Interactions/Observations Interacted appropriately   Affect/Mood Range Normal range   Affect/Mood Display Appropriate   Goal Progression Progressing

## 2018-04-27 NOTE — PROGRESS NOTES
"Group Psychotherapy (MD)     Site: [facility]     Clinical status of patient: Intensive Outpatient Program (IOP)     Date: 4/25/18     Group Focus: Medical and Neuropsychiatric Bases of Psychiatric Disease and Addiction: anxiety     Length of service: 72558 - 45-50 minutes     Number of patients in attendance: 12     Referred by: Addictive Behavioral Unit Treatment Team     Target symptoms: Anxiety     Patient's response to treatment: Active Listening       Interval history: Discussed psychiatric symptoms of anxiety and neurochemical basis of anxiety, relating it to addiction and other psychiatric disorders. Provided rationale for use of various anxiety treatments, introduced concept of avoidance and discussed ways to gradually expose self to sources of anxiety. Explored responses to anxiety and helped patient separate those responses into either 'self care" or "self medication".       Progress towards goals: progressing adequately       Diagnosis: Alcohol use disorder, severe, dependence     Plan: Continue treatment on ABU  "

## 2018-04-27 NOTE — PROGRESS NOTES
Group Psychotherapy (PhD/LCSW)    Site: [facility]    Clinical status of patient: Intensive Outpatient Program (IOP)    Date: 4/27/2018    Group Focus: Stress Management    Length of service: 34496 - 45-50 minutes    Number of patients in attendance: 10    Referred by: Addictive Behavior Unit Treatment Team    Target symptoms: Alcohol Abuse    Patient's response to treatment: Active Listening    Progress toward goals: Progressing adequately    Interval History: Group learned mindfulness techniques (breath, muscles, defusion imagery) to improve present-moment awareness, impulsive behavior tendencies, and tolerance of various emotional states.    Diagnosis: Alcohol Use Disorder    Plan: Continue treatment on ABU

## 2018-04-28 NOTE — PROGRESS NOTES
Group Psychotherapy (PhD/LCSW)    Site: [facility]    Clinical status of patient: Intensive Outpatient Program (IOP)    Date: 4/27/2018    Group Focus: Psychodynamic Group Psychotherapy    Length of service: 92423 - 45-50 minutes    Number of patients in attendance: 5    Referred by: Addictive Behavior Unit Treatment Team    Target symptoms: Alcohol Abuse    Patient's response to treatment: Active Listening and Self-disclosure    Progress toward goals: Progressing adequately    Interval History: Discussed strategies for maintaining sobriety in social situations where others are drinking such as [employer]. .    Diagnosis: alcohol use disorder, severe, dependence    Plan: Continue treatment on ABU

## 2018-04-30 ENCOUNTER — HOSPITAL ENCOUNTER (OUTPATIENT)
Dept: PSYCHIATRY | Facility: HOSPITAL | Age: 43
Discharge: HOME | End: 2018-04-30
Attending: PSYCHIATRY & NEUROLOGY
Payer: COMMERCIAL

## 2018-04-30 VITALS — HEART RATE: 95 BPM | DIASTOLIC BLOOD PRESSURE: 61 MMHG | SYSTOLIC BLOOD PRESSURE: 90 MMHG | RESPIRATION RATE: 16 BRPM

## 2018-04-30 DIAGNOSIS — F33.2 MAJOR DEPRESSIVE DISORDER, RECURRENT SEVERE WITHOUT PSYCHOTIC FEATURES: ICD-10-CM

## 2018-04-30 DIAGNOSIS — F10.20 ALCOHOL USE DISORDER, SEVERE, DEPENDENCE: Primary | ICD-10-CM

## 2018-04-30 LAB
AMPHET+METHAMPHET UR QL: NEGATIVE
BARBITURATES UR QL SCN>200 NG/ML: NEGATIVE
BENZODIAZ UR QL SCN>200 NG/ML: NEGATIVE
BREATH ALCOHOL: 0
BZE UR QL SCN: NEGATIVE
CANNABINOIDS UR QL SCN: NEGATIVE
CREAT UR-MCNC: 29 MG/DL
ETHANOL UR-MCNC: <10 MG/DL
METHADONE UR QL SCN>300 NG/ML: NEGATIVE
OPIATES UR QL SCN: NEGATIVE
PCP UR QL SCN>25 NG/ML: NEGATIVE
TOXICOLOGY INFORMATION: NORMAL

## 2018-04-30 PROCEDURE — 90834 PSYTX W PT 45 MINUTES: CPT | Mod: 59,,, | Performed by: PSYCHOLOGIST

## 2018-04-30 PROCEDURE — 90853 GROUP PSYCHOTHERAPY: CPT | Mod: ,,, | Performed by: PSYCHOLOGIST

## 2018-04-30 PROCEDURE — 90853 GROUP PSYCHOTHERAPY: CPT

## 2018-04-30 PROCEDURE — 80307 DRUG TEST PRSMV CHEM ANLYZR: CPT

## 2018-04-30 NOTE — PROGRESS NOTES
Group Psychotherapy (PhD/LCSW)    Site: [facility]    Clinical status of patient: Intensive Outpatient Program (IOP)    Date: 4/30/2018    Group Focus: Psychodynamic Group Psychotherapy    Length of service: 42932 - 45-50 minutes    Number of patients in attendance: 7    Referred by: Addictive Behavior Unit Treatment Team    Target symptoms: Alcohol Abuse    Patient's response to treatment: Active Listening and Self-disclosure    Progress toward goals: Progressing adequately    Interval History: Pt shared her story with a new group member.     Diagnosis: alcohol use disorder, severe, dependence    Plan: Continue treatment on ABU

## 2018-04-30 NOTE — PROGRESS NOTES
"[facility]  Psychology  Progress Note  Individual Psychotherapy (PhD/LCSW)    Patient Name: Brooke Schwartz  MRN: [id number]    Patient Class: OP- Behavioral Recurring   Admission Date: 4/30/2018  Hospital Length of Stay: 0 days  Attending Physician: Clarice Bethea MD  Primary Care Provider: Lefty Solis MD    Therapeutic Intervention: Met with patient.  Outpatient - Insight oriented psychotherapy 45 min - CPT code 61859    Chief Complaint/Reason for Encounter: addictive disorder     Interval History and Content of Current Session: Pt reports she is doing well. She says she feels so much better since getting into recovery. She finds herself feeling amazed that she can function well without etoh or benzo's. Session focused on problems in communication with her spouse. Pt noted that she has never been very in touch with her feelings, much less being able to talk about them with her spouse. She also noted how she was aware of feeling disconnected from him even though she has discontinued etoh. Pt could see that being in recovery was bringing up feelings that were previously suppressed due her substance abuse. She could also understand that recovery can challenge couples to develop new ways to relate to each other as the whole dynamic system is challenged to adjust. We discussed how she could use I-messages and Reflective Listening to enrich their communication, enhance their relationship, and develop a sense of "connectness" that is currently missing for the pt .     Risk Parameters:  Patient reports no suicidal ideation  Patient reports no homicidal ideation  Patient reports no self-injurious behavior  Patient reports no violent behavior    Verbal Deficits: None    Patient's response to intervention:  The patient's response to intervention is accepting.    Progress toward goals and other mental status changes:  The patient's progress toward goals is fair .    Diagnostic Impression - Plan:      " Diagnosis:     Alcohol Use d/o    Treatment Plan:  · Target symptoms: alcohol abuse, depression, anxiety   · Why chosen therapy is appropriate versus another modality: relevant to diagnosis, patient responds to this modality  · Outcome monitoring methods: self-report, observation, Chart notes   · Therapeutic intervention type: insight oriented psychotherapy    Plan:  ABU    Return to Clinic: as scheduled    Length of Service (minutes): 45    Donaldo Anna, PhD  Psychology  [facility]

## 2018-04-30 NOTE — PROGRESS NOTES
Group Psychotherapy (PhD/LCSW)    Site: [facility]    Clinical status of patient: Intensive Outpatient Program (IOP)    Date: 4/30/2018    Group Focus: Communication Skills       Length of service: 56680 - 45-50 minutes    Number of patients in attendance: 13     Referred by: Addictive Behavior Unit Treatment Team    Target symptoms: Alcohol Abuse    Patient's response to treatment: Active Listening and Self-disclosure    Progress toward goals: Progressing adequately    Interval History: Discussed basic communication skills (I-messages; Reflective Listening; contextual considerations). Illustrated their value through vignettes and role play.     Diagnosis: alcohol use disorder, severe, dependence    Plan: Continue treatment on ABU

## 2018-05-01 ENCOUNTER — HOSPITAL ENCOUNTER (OUTPATIENT)
Dept: PSYCHIATRY | Facility: HOSPITAL | Age: 43
Discharge: HOME | End: 2018-05-01
Attending: PSYCHIATRY & NEUROLOGY
Payer: COMMERCIAL

## 2018-05-01 DIAGNOSIS — F10.20 ALCOHOL USE DISORDER, SEVERE, DEPENDENCE: Primary | ICD-10-CM

## 2018-05-01 DIAGNOSIS — F33.2 MAJOR DEPRESSIVE DISORDER, RECURRENT SEVERE WITHOUT PSYCHOTIC FEATURES: Primary | ICD-10-CM

## 2018-05-01 DIAGNOSIS — F33.2 MAJOR DEPRESSIVE DISORDER, RECURRENT SEVERE WITHOUT PSYCHOTIC FEATURES: ICD-10-CM

## 2018-05-01 LAB — BREATH ALCOHOL: 0

## 2018-05-01 PROCEDURE — 90853 GROUP PSYCHOTHERAPY: CPT | Performed by: SOCIAL WORKER

## 2018-05-01 PROCEDURE — 90853 GROUP PSYCHOTHERAPY: CPT

## 2018-05-01 PROCEDURE — 90853 GROUP PSYCHOTHERAPY: CPT | Mod: ,,, | Performed by: PSYCHOLOGIST

## 2018-05-01 PROCEDURE — 99232 SBSQ HOSP IP/OBS MODERATE 35: CPT | Mod: ,,, | Performed by: PSYCHIATRY & NEUROLOGY

## 2018-05-01 RX ORDER — VENLAFAXINE HYDROCHLORIDE 150 MG/1
CAPSULE, EXTENDED RELEASE ORAL
Qty: 90 CAPSULE | Refills: 0 | OUTPATIENT
Start: 2018-05-01

## 2018-05-01 RX ORDER — GABAPENTIN 300 MG/1
300 CAPSULE ORAL 3 TIMES DAILY
Qty: 90 CAPSULE | Refills: 1 | Status: SHIPPED | OUTPATIENT
Start: 2018-05-01 | End: 2019-05-01

## 2018-05-01 RX ORDER — VENLAFAXINE HYDROCHLORIDE 150 MG/1
150 CAPSULE, EXTENDED RELEASE ORAL DAILY
Qty: 30 CAPSULE | Refills: 0 | Status: SHIPPED | OUTPATIENT
Start: 2018-05-01 | End: 2018-05-11 | Stop reason: DRUGHIGH

## 2018-05-01 NOTE — PROGRESS NOTES
"Addiction Psychiatry Progress Note    ENCOUNTER DATE: 5/1/2018  SITE: [facility]    CHIEF COMPLAINT : alcohol use disorder  Patient is a 42 y.o. female, admitted on 4/5/2018 with principal problem of alcohol use disorder    SUBJECTIVE  Pt pleasant and cooperative with interview. Pt recounted progression of alcohol abuse as multiple psychosocial stressors progressed- death of father, moving from [place], mother's illness and strife with sister over mother's care. Pt discussed missing work as being her "bottom". Pt reports that she has not been having cravings most days but was "triggered" on her birthday. Pt described going out for dinner with , seeing that most patrons had wine with the meal and wanting a glass. Pt was able to process the craving and did not drink. Discussed developing the scenario more mentally- thinking about her  abandoning her at a restaurant because she chose to drink. Identifies well that this would "sad" for her and not worth having a drink. Also recognizing that if she drank that time, she would be more likely to relapse worse in the future.      Pt discussed feeling more anxious recently. Agreeable to increasing dose of effexor as previously suggested by Dr. Duarte.     ROS  Denies any medical complaints    EXAMINATION    VITALS   There were no vitals filed for this visit.  Labs  4/16/18: Urine drug test positive for benzos only  4/16/18: BAL negative.  Ethyl glucuronide 4/11 negative.        CONSTITUTIONAL  General Appearance: age appropriate, neatly groomed, thin & gaunt looking    PSYCHIATRIC   Mental Status Exam:  Behavior/Cooperation: pleasant, cooperative, good eye contact   Speech: spontaneous, rate and volume appropriate   Mood: "fine"  Affect: reactive, mood congruent   Thought Process: normal and logical  Thought Content: not suicidal or homicidal  Orientation: grossly intact  Memory: Grossly intact  Attention Span/Concentration: " Normal  Insight: intact   Judgment: intact     MEDICAL DECISION MAKING    Diagnosis:  Alcohol use disorder-severe  Substance-induced mood disorder     Plan:  1. Continue patient on ABU protocol.  2.  Breathalyzer daily and Urine toxicology three times a week.  3.  VS daily 3 days a week  4.  Patient counseled on continuing cessation from alcohol.  5. Discontinued Valium on 4/5/18  6. Continue gabapentin 300mg TID  7. Increase  Effexor ER to 150 mg daily for mood and anxiety  8.Will discuss need to self report to [place] [employer]     Maeghan Davis, MD [facility] Psychiatry Addiction Fellow

## 2018-05-01 NOTE — PROGRESS NOTES
Group Psychotherapy (PhD/LCSW)    Site: [facility]    Clinical status of patient: Intensive Outpatient Program (IOP)    Date: 5/1/2018    Group Focus: DBT-Based Group Psychotherapy    Length of service: 30109 - 45-50 minutes    Number of patients in attendance: 13    Referred by: Addictive Behavior Unit Treatment Team    Target symptoms: Alcohol Abuse    Patient's response to treatment: Active Listening and Self-disclosure    Progress toward goals: Progressing adequately    Interval History:  Session focus was Interpersonal Effectiveness:  GIVE and FAST.  Patients were introduced to skills to promote active listening, self-respect, and attendance to values.    Diagnosis: alcohol use disorder, severe, dependence    Plan: Continue treatment on ABU

## 2018-05-01 NOTE — PROGRESS NOTES
Group Psychotherapy (PhD/LCSW)    Site: [facility]    Clinical status of patient: Intensive Outpatient Program (IOP)    Date: 5/1/2018    Group Focus: Disease Model of Addiction      Length of service: 01614 - 45-50 minutes    Number of patients in attendance: 6    Referred by: Addictive Behavior Unit Treatment Team    Target symptoms: Alcohol Abuse    Patient's response to treatment: Active Listening and Self-disclosure    Progress toward goals: Progressing adequately    Interval History: Discussed basic neuropsychological concepts of the Disease Model of Addiction and their relationship to the phenomena of addiction and recovery. Emphasized the value of understanding of the Disease model for sustaining long-term sobriety.     Diagnosis: alcohol use disorder, severe, dependence    Plan: Continue treatment on ABU

## 2018-05-01 NOTE — PLAN OF CARE
05/01/18 1400   Activity/Group Therapy Checklist   Group Goals/Reflection  (life story )   Attendance Attended   Follows Direction Followed directions   Group Interactions/Observations Interacted appropriately   Affect/Mood Range Normal range   Affect/Mood Display Appropriate   Goal Progression Progressing

## 2018-05-02 ENCOUNTER — HOSPITAL ENCOUNTER (OUTPATIENT)
Dept: PSYCHIATRY | Facility: HOSPITAL | Age: 43
Discharge: HOME | End: 2018-05-02
Attending: PSYCHIATRY & NEUROLOGY
Payer: COMMERCIAL

## 2018-05-02 VITALS — DIASTOLIC BLOOD PRESSURE: 72 MMHG | SYSTOLIC BLOOD PRESSURE: 107 MMHG | HEART RATE: 80 BPM | RESPIRATION RATE: 16 BRPM

## 2018-05-02 DIAGNOSIS — F10.20 ALCOHOL USE DISORDER, SEVERE, DEPENDENCE: Primary | ICD-10-CM

## 2018-05-02 DIAGNOSIS — F33.2 MAJOR DEPRESSIVE DISORDER, RECURRENT SEVERE WITHOUT PSYCHOTIC FEATURES: ICD-10-CM

## 2018-05-02 LAB
AMPHET+METHAMPHET UR QL: NEGATIVE
BARBITURATES UR QL SCN>200 NG/ML: NEGATIVE
BENZODIAZ UR QL SCN>200 NG/ML: NEGATIVE
BREATH ALCOHOL: 0
BZE UR QL SCN: NEGATIVE
CANNABINOIDS UR QL SCN: NEGATIVE
CREAT UR-MCNC: 82 MG/DL
ETHANOL UR-MCNC: <10 MG/DL
METHADONE UR QL SCN>300 NG/ML: NEGATIVE
OPIATES UR QL SCN: NEGATIVE
PCP UR QL SCN>25 NG/ML: NEGATIVE
TOXICOLOGY INFORMATION: NORMAL

## 2018-05-02 PROCEDURE — 80307 DRUG TEST PRSMV CHEM ANLYZR: CPT

## 2018-05-02 PROCEDURE — 99232 SBSQ HOSP IP/OBS MODERATE 35: CPT | Mod: ,,, | Performed by: PSYCHIATRY & NEUROLOGY

## 2018-05-02 PROCEDURE — 90853 GROUP PSYCHOTHERAPY: CPT | Mod: ,,, | Performed by: PSYCHIATRY & NEUROLOGY

## 2018-05-02 PROCEDURE — 90853 GROUP PSYCHOTHERAPY: CPT

## 2018-05-02 PROCEDURE — 90853 GROUP PSYCHOTHERAPY: CPT | Performed by: SOCIAL WORKER

## 2018-05-02 PROCEDURE — 90853 GROUP PSYCHOTHERAPY: CPT | Mod: ,,, | Performed by: PSYCHOLOGIST

## 2018-05-02 NOTE — PLAN OF CARE
05/02/18 1400   Activity/Group Therapy Checklist   Group Relapse Prevention   Attendance Attended   Follows Direction Followed directions   Group Interactions/Observations Interacted appropriately   Affect/Mood Range Normal range   Affect/Mood Display Appropriate   Goal Progression Progressing

## 2018-05-02 NOTE — PATIENT CARE CONFERENCE
Alcohol use disorder, severe  Substance-induced mood disorder     1. Pt is attending all groups    2. Pt is attending all meetings  3. Pt 's has minimally supportive family  4. Pt has completed spiritual assessment    5. Pt will present life story    6. Pt will present Step One assignment    7. Pt is exploring issues related to relapse  prevention; spirituality; stress management; improved communication skills; assertiveness training; poor self-esteem; disease concepts; cross addictions; and, work related issues    8. D/C date: 5/17/18     Staff discussed pt's anxiety about work and lack of focus on tx due to work issues. Staffing discussed pt refusing to report ETOH tx to the medical board and possible consequences. Staffing discussed pt needing to focus on tx, complete assignments, and obtain a sponsor prior to d/c.    Problem: Alcohol use Disorder, Severe  Goal: Address in 12 step meetings and group and individual sessions    Objective Measure: participation in groups, self report, length of sobriety, and relapse prevention plan  Time: Prior to discharge    Progress: Pt is attending groups and sessions     Problem: Substance-induced mood disorder   Goal: Address in 12 step meetings and group and individual sessions    Objective Measure: participation in groups, self report, length of sobriety, and relapse prevention plan  Time: Prior to discharge    Progress: Pt is attending groups and sessions        Staff members present:    MD Dr. Jeovany Madrigal MD, Resident  Dr. Michael MD, Resident  Dr. Simons, Ph.D.  Margie John, [facility]  José Miguel Brennan, [facility]  Vinay Reyez, [facility]  Devora King RN

## 2018-05-02 NOTE — PROGRESS NOTES
Group Psychotherapy (PhD/LCSW)    Site: [facility]    Clinical status of patient: Intensive Outpatient Program (IOP)    Date: 5/2/2018    Group Focus: DBT-Based Group Psychotherapy    Length of service: 74012 - 45-50 minutes    Number of patients in attendance: 12    Referred by: Addictive Behavior Unit Treatment Team    Target symptoms: Alcohol Abuse    Patient's response to treatment: Active Listening and Self-disclosure    Progress toward goals: Progressing adequately    Interval History:  Session focus was Interpersonal Effectiveness:  Validation.  Patients were encouraged to focus on validation of others by enhancing their ability to hear, understand, and respect others.    Diagnosis: alcohol use disorder, severe, dependence    Plan: Continue treatment on ABU

## 2018-05-02 NOTE — PROGRESS NOTES
Group Psychotherapy (PhD/LCSW)    Site: [facility]    Clinical status of patient: Intensive Outpatient Program (IOP)    Date: 5/2/2018    Group Focus: Psychodynamic Group Psychotherapy    Length of service: 95302 - 45-50 minutes    Number of patients in attendance: 7    Referred by: Addictive Behavior Unit Treatment Team    Target symptoms: Alcohol Abuse    Patient's response to treatment: Active Listening and Self-disclosure    Progress toward goals: Progressing adequately    Interval History: Discussed things being better at home.  drinking less.    Diagnosis: alcohol use disorder, severe, dependence    Plan: Continue treatment on ABU

## 2018-05-02 NOTE — PROGRESS NOTES
Group Psychotherapy (PhD/LCSW)    Site: [facility]    Clinical status of patient: Intensive Outpatient Program (IOP)    Date: 5/1/2018    Group Focus: Psychodynamic Group Psychotherapy    Length of service: 89110 - 45-50 minutes    Number of patients in attendance: 6    Referred by: Addictive Behavior Unit Treatment Team    Target symptoms: Alcohol Abuse    Patient's response to treatment: Active Listening and Self-disclosure    Progress toward goals: Progressing adequately    Interval History: Discussed return to work issues.    Diagnosis: alcohol use disorder, severe, dependence    Plan: Continue treatment on ABU

## 2018-05-02 NOTE — PROGRESS NOTES
"Addiction Psychiatry Progress Note    ENCOUNTER DATE: 5/2/2018  SITE: [facility]    CHIEF COMPLAINT : alcohol use disorder  Patient is a 42 y.o. female, admitted on 4/5/2018 with principal problem of alcohol use disorder    SUBJECTIVE  Pt pleasant and cooperative with interview. Pt pleasant and cooperative. Pt reports that anxiety appears improved today after taking increased dose of effexor this morning. She is noticing mild tremor with use of effexor. Pt report that she is feeling "good" with anxiety better controlled.   Discussed use of antabuse as a deterrent to relapse. Pt reported having prescription at home but has not been taking it. Acquired prescription during a period when her  threatened her with an ultimatum. Pt took for a short time, noticed no negative side effects. Pt reports that she doesn't feel like it is needed currently but would be willing to take it.   Pt reports that her coping skills are improved and anxiety is better controlled, which makes her feel more confident in continuing sobriety.   Pt  is still drinking, but has decided that he will not drink in front     ROS  Denies any medical complaints    EXAMINATION    VITALS   Vitals:    05/02/18 1046   BP: 107/72   Pulse: 80   Resp: 16           CONSTITUTIONAL  General Appearance: age appropriate, neatly groomed, thin & gaunt looking    PSYCHIATRIC   Mental Status Exam:  Behavior/Cooperation: pleasant, cooperative, good eye contact   Speech: spontaneous, rate and volume appropriate   Mood: "good"  Affect: constricted   Thought Process: goal oriented, linear   Thought Content: not suicidal or homicidal  Orientation: grossly intact  Memory: Grossly intact  Attention Span/Concentration: Normal  Insight: intact   Judgment: intact     MEDICAL DECISION MAKING    Diagnosis:  Alcohol use disorder-severe  Substance-induced mood disorder       Plan:  1. Continue patient on ABU protocol.  2.  Breathalyzer daily " and Urine toxicology three times a week.  3.  VS daily 3 days a week  4.. Continue gabapentin 300mg TID  5. Continue Effexor XR 150mg daily   6. Consider addition of antabuse for alcohol use disorder       Maeghan Davis, MD [facility] Psychiatry Addiction Fellow

## 2018-05-03 ENCOUNTER — HOSPITAL ENCOUNTER (OUTPATIENT)
Dept: PSYCHIATRY | Facility: HOSPITAL | Age: 43
Discharge: HOME | End: 2018-05-03
Attending: PSYCHIATRY & NEUROLOGY
Payer: COMMERCIAL

## 2018-05-03 DIAGNOSIS — F10.20 ALCOHOL USE DISORDER, SEVERE, DEPENDENCE: Primary | ICD-10-CM

## 2018-05-03 DIAGNOSIS — F33.2 MAJOR DEPRESSIVE DISORDER, RECURRENT SEVERE WITHOUT PSYCHOTIC FEATURES: ICD-10-CM

## 2018-05-03 LAB — BREATH ALCOHOL: 0

## 2018-05-03 PROCEDURE — 90853 GROUP PSYCHOTHERAPY: CPT | Performed by: SOCIAL WORKER

## 2018-05-03 PROCEDURE — 90853 GROUP PSYCHOTHERAPY: CPT | Mod: ,,, | Performed by: PSYCHOLOGIST

## 2018-05-03 PROCEDURE — 90853 GROUP PSYCHOTHERAPY: CPT

## 2018-05-03 NOTE — PLAN OF CARE
05/03/18 1000   Activity/Group Therapy Checklist   Group Goals/Reflection  (miracle question        )   Attendance Attended   Follows Direction Followed directions   Group Interactions/Observations Interacted appropriately   Affect/Mood Range Normal range   Affect/Mood Display Appropriate   Goal Progression Progressing

## 2018-05-03 NOTE — PROGRESS NOTES
Group Psychotherapy (PhD/LCSW)    Site: [facility]    Clinical status of patient: Intensive Outpatient Program (IOP)    Date: 5/3/2018    Group Focus: Psychodynamic Group Psychotherapy    Length of service: 77257 - 45-50 minutes    Number of patients in attendance: 7    Referred by: Addictive Behavior Unit Treatment Team    Target symptoms: Alcohol Abuse    Patient's response to treatment: Active Listening and Self-disclosure    Progress toward goals: Progressing adequately    Interval History: Shared her story with new group member.    Diagnosis: alcohol use disorder, severe, dependence    Plan: Continue treatment on ABU

## 2018-05-03 NOTE — PROGRESS NOTES
Group Psychotherapy (PhD/LCSW)    Site: [facility]    Clinical status of patient: Intensive Outpatient Program (IOP)    Date: 5/3/2018    Group Focus: DBT-Based Group Psychotherapy    Length of service: 99535 - 45-50 minutes    Number of patients in attendance: 13    Referred by: Addictive Behavior Unit Treatment Team    Target symptoms: Alcohol Abuse    Patient's response to treatment: Active Listening and Self-disclosure    Progress toward goals: Progressing adequately    Interval History:  Session focus was Interpersonal Effectiveness:  Validation.  Patients were encouraged to focus on validation of themselves by enhancing their ability to hear, understand, and respect themselves.    Diagnosis: alcohol use disorder, severe, dependence    Plan: Continue treatment on ABU         normal...

## 2018-05-03 NOTE — PLAN OF CARE
05/03/18 1400   Activity/Group Therapy Checklist   Group Relapse Prevention  (Step Work )   Attendance Attended   Follows Direction Followed directions   Group Interactions/Observations Interacted appropriately   Affect/Mood Range Normal range   Affect/Mood Display Appropriate   Goal Progression Progressing

## 2018-05-04 ENCOUNTER — HOSPITAL ENCOUNTER (OUTPATIENT)
Dept: PSYCHIATRY | Facility: HOSPITAL | Age: 43
Discharge: HOME | End: 2018-05-04
Attending: PSYCHIATRY & NEUROLOGY
Payer: COMMERCIAL

## 2018-05-04 VITALS — RESPIRATION RATE: 16 BRPM | HEART RATE: 89 BPM | SYSTOLIC BLOOD PRESSURE: 108 MMHG | DIASTOLIC BLOOD PRESSURE: 74 MMHG

## 2018-05-04 DIAGNOSIS — F33.2 MAJOR DEPRESSIVE DISORDER, RECURRENT SEVERE WITHOUT PSYCHOTIC FEATURES: ICD-10-CM

## 2018-05-04 DIAGNOSIS — F10.20 ALCOHOL USE DISORDER, SEVERE, DEPENDENCE: Primary | ICD-10-CM

## 2018-05-04 LAB
AMPHET+METHAMPHET UR QL: NEGATIVE
BARBITURATES UR QL SCN>200 NG/ML: NEGATIVE
BENZODIAZ UR QL SCN>200 NG/ML: NEGATIVE
BREATH ALCOHOL: 0
BZE UR QL SCN: NEGATIVE
CANNABINOIDS UR QL SCN: NEGATIVE
CREAT UR-MCNC: 35 MG/DL
ETHANOL UR-MCNC: <10 MG/DL
METHADONE UR QL SCN>300 NG/ML: NEGATIVE
OPIATES UR QL SCN: NEGATIVE
PCP UR QL SCN>25 NG/ML: NEGATIVE
TOXICOLOGY INFORMATION: NORMAL

## 2018-05-04 PROCEDURE — 90853 GROUP PSYCHOTHERAPY: CPT | Mod: ,,, | Performed by: PSYCHOLOGIST

## 2018-05-04 PROCEDURE — 90853 GROUP PSYCHOTHERAPY: CPT | Performed by: SOCIAL WORKER

## 2018-05-04 PROCEDURE — 90853 GROUP PSYCHOTHERAPY: CPT

## 2018-05-04 PROCEDURE — 80307 DRUG TEST PRSMV CHEM ANLYZR: CPT

## 2018-05-04 PROCEDURE — 99232 SBSQ HOSP IP/OBS MODERATE 35: CPT | Mod: ,,, | Performed by: PSYCHIATRY & NEUROLOGY

## 2018-05-04 NOTE — PROGRESS NOTES
Group Psychotherapy (PhD/LCSW)    Site: [facility]    Clinical status of patient: Intensive Outpatient Program (IOP)    Date: 5/4/2018    Group Focus: Stress Management    Length of service: 56418 - 45-50 minutes    Number of patients in attendance: 12    Referred by: Addictive Behavior Unit Treatment Team    Target symptoms: Alcohol Abuse    Patient's response to treatment: Active Listening    Progress toward goals: Progressing adequately    Interval History: Group learned mindfulness techniques (senses, eating) to improve present-moment awareness, impulsive behavior tendencies, and tolerance of various emotional states.    Diagnosis: Alcohol Use Disorder    Plan: Continue treatment on ABU

## 2018-05-04 NOTE — PROGRESS NOTES
"Addiction Psychiatry Progress Note    ENCOUNTER DATE: 5/4/2018  SITE: [facility]    CHIEF COMPLAINT : alcohol use disorder  Patient is a 42 y.o. female, admitted on 4/5/2018 with principal problem of alcohol use disorder    SUBJECTIVE  Pt seen this afternoon. Pt reports that she will be attending [employer] with a co-worker, plans to meet up with other ABU participants also. Pt would typically drink at an event like this but reports that she is not concerned about feeling tempted to drink. Pt reports that her co-worker will not give her any peer pressure to drink. Pt plans to attend her usual AA meeting in the evening as way to limit her time at [employer] [facility].   Pt anxiety is better controlled with increased dose of Effexor. Pt reports that anxiety is comes and goes, but does not feel panicked. Slept poorly last night but attributes this to following asleep very early on couch then reawakening to move to the bed, sleep was restless after that point. Pt reports energy is subsequently low. Denied feeling suicidal or homicidal.     ROS  Denies any medical complaints    EXAMINATION    VITALS   Vitals:    05/04/18 1045   BP: 108/74   Pulse: 89   Resp: 16           CONSTITUTIONAL  General Appearance: age appropriate, neatly groomed, casually dressed     PSYCHIATRIC   Mental Status Exam:  Behavior/Cooperation: pleasant, cooperative  Speech: spontaneous, rate and volume appropriate   Mood: "good"  Affect: constricted, mood incongruent    Thought Process: goal oriented, linear   Thought Content: not suicidal or homicidal  Orientation: grossly intact  Memory: Grossly intact  Attention Span/Concentration: Normal  Insight: intact   Judgment: intact     MEDICAL DECISION MAKING    Diagnosis:  Alcohol use disorder-severe  Generalized Anxiety Disorder vs Substance Induced Mood Disorder        Plan:  1. Continue patient on ABU protocol.  2.  Breathalyzer daily and Urine toxicology three times a week.  3.  VS " daily 3 days a week  4.. Continue gabapentin 300mg TID  5. Continue Effexor XR 150mg daily         Sabrina Thayer MD  [facility] Psychiatry Addiction Fellow

## 2018-05-04 NOTE — PLAN OF CARE
05/04/18 1400   Activity/Group Therapy Checklist   Group Music   Attendance Attended   Follows Direction Followed directions   Group Interactions/Observations Interacted appropriately   Affect/Mood Range Normal range   Affect/Mood Display Appropriate   Goal Progression Progressing

## 2018-05-04 NOTE — PLAN OF CARE
05/04/18 1300   Activity/Group Therapy Checklist   Group Relapse Prevention  (Step Work )   Attendance Attended   Follows Direction Followed directions   Group Interactions/Observations Interacted appropriately   Affect/Mood Range Normal range   Affect/Mood Display Appropriate   Goal Progression Progressing

## 2018-05-04 NOTE — PROGRESS NOTES
"Group Psychotherapy (PhD/LCSW)    Site: [facility]    Clinical status of patient: Intensive Outpatient Program (IOP)    Date: 5/4/2018    Group Focus: Psychodynamic Group Psychotherapy    Length of service: 71326 - 45-50 minutes    Number of patients in attendance: 7    Referred by: Addictive Behavior Unit Treatment Team    Target symptoms: Alcohol Abuse    Patient's response to treatment: Active Listening and Self-disclosure    Progress toward goals: Progressing adequately    Interval History: Discussed the application and value of the recovery concepts of "staying in one's gale" and "cleaning up one's own side of the street" when encountering interpersonal difficulties.     Diagnosis: alcohol use disorder, severe, dependence    Plan: Continue treatment on ABU        "

## 2018-05-05 LAB — ETHYL GLUCURONIDE: NEGATIVE

## 2018-05-07 ENCOUNTER — HOSPITAL ENCOUNTER (OUTPATIENT)
Dept: PSYCHIATRY | Facility: HOSPITAL | Age: 43
Discharge: HOME | End: 2018-05-07
Attending: PSYCHIATRY & NEUROLOGY
Payer: COMMERCIAL

## 2018-05-07 VITALS — DIASTOLIC BLOOD PRESSURE: 81 MMHG | HEART RATE: 79 BPM | RESPIRATION RATE: 16 BRPM | SYSTOLIC BLOOD PRESSURE: 116 MMHG

## 2018-05-07 DIAGNOSIS — F33.2 MAJOR DEPRESSIVE DISORDER, RECURRENT SEVERE WITHOUT PSYCHOTIC FEATURES: ICD-10-CM

## 2018-05-07 DIAGNOSIS — F10.20 ALCOHOL USE DISORDER, SEVERE, DEPENDENCE: ICD-10-CM

## 2018-05-07 LAB
AMPHET+METHAMPHET UR QL: NEGATIVE
BARBITURATES UR QL SCN>200 NG/ML: NEGATIVE
BENZODIAZ UR QL SCN>200 NG/ML: NEGATIVE
BREATH ALCOHOL: 0
BZE UR QL SCN: NEGATIVE
CANNABINOIDS UR QL SCN: NEGATIVE
CREAT UR-MCNC: 91 MG/DL
ETHANOL UR-MCNC: <10 MG/DL
METHADONE UR QL SCN>300 NG/ML: NEGATIVE
OPIATES UR QL SCN: NEGATIVE
PCP UR QL SCN>25 NG/ML: NEGATIVE
TOXICOLOGY INFORMATION: NORMAL

## 2018-05-07 PROCEDURE — 80307 DRUG TEST PRSMV CHEM ANLYZR: CPT

## 2018-05-07 PROCEDURE — 90853 GROUP PSYCHOTHERAPY: CPT

## 2018-05-07 PROCEDURE — 90853 GROUP PSYCHOTHERAPY: CPT | Performed by: SOCIAL WORKER

## 2018-05-07 PROCEDURE — 99232 SBSQ HOSP IP/OBS MODERATE 35: CPT | Mod: ,,, | Performed by: PSYCHIATRY & NEUROLOGY

## 2018-05-07 PROCEDURE — 90853 GROUP PSYCHOTHERAPY: CPT | Mod: 59,,, | Performed by: PSYCHOLOGIST

## 2018-05-07 NOTE — PROGRESS NOTES
Group Psychotherapy (PhD/LCSW)    Site: [facility]    Clinical status of patient: Intensive Outpatient Program (IOP)    Date: 5/7/2018    Group Focus: Psychodynamic Group Psychotherapy    Length of service: 32847 - 45-50 minutes    Number of patients in attendance: 7    Referred by: Addictive Behavior Unit Treatment Team    Target symptoms: Alcohol Abuse    Patient's response to treatment: Active Listening and Self-disclosure    Progress toward goals: Progressing adequately    Interval History: Discussed socializing this weekend at [facility] with no temptations to use. Reports that communication with spouse continues to go well.     Diagnosis: alcohol use disorder, severe, dependence    Plan: Continue treatment on ABU

## 2018-05-07 NOTE — PLAN OF CARE
05/07/18 1400   Activity/Group Therapy Checklist   Group Educational   Attendance Attended   Follows Direction Followed directions   Group Interactions/Observations Interacted appropriately;Sharing;Supportive   Affect/Mood Range Normal range   Affect/Mood Display Appropriate   Goal Progression Progressing

## 2018-05-07 NOTE — PROGRESS NOTES
"Addiction Psychiatry Progress Note    ENCOUNTER DATE: 5/7/2018  SITE: [facility], [facility]    CHIEF COMPLAINT: alcohol use disorder  Patient is a 42 y.o. female, admitted on 4/5/2018 with principal problem of alcohol use disorder    SUBJECTIVE  Pt seen this afternoon. Pt reports that she did attend [employer] and found it enjoyable, states that she would have typically had some drinks at an event like this but reported that she did not drink this weekend and reported that she was pleased at how little a drive to drink impacted her relative to how much it had in the past. She stated that craving manifests for her in two ways, as wanting a drink when seeing others drink, or to cope when anxious. Regarding the former, states that she has noticed that the way she thinks about drinking has shifted, that she no longer views drinking daily as a norm. Regarding the latter, pt reports her anxiety is better controlled with Effexor. Also thinks gabapentin was helpful. States "I didn't know I could feel this good without drinking." Regarding the program, pt stated it has been helpful but did not provide concrete examples or details of how the program has impacted her.    Denied feeling suicidal or homicidal.        ROS  Denies any medical complaints  Constitutional: sleep ok    EXAMINATION    VITALS   Vitals:    05/07/18 1107   BP: 116/81   Pulse: 79   Resp: 16           CONSTITUTIONAL  General Appearance: age appropriate, neatly groomed, casually dressed     PSYCHIATRIC   Mental Status Exam:  Behavior/Cooperation: pleasant, cooperative, somewhat guarded  Speech: spontaneous, rate and volume appropriate   Mood: "I didn't know I could feel this good without alcohol"  Affect: constricted, mood incongruent    Thought Process: goal oriented, linear   Thought Content: not suicidal or homicidal  Orientation: grossly intact  Memory: Grossly intact  Attention Span/Concentration: Normal  Insight: intact   Judgment: " intact     MEDICAL DECISION MAKING    Diagnosis:  Alcohol use disorder-severe  Generalized Anxiety Disorder vs Substance Induced Mood Disorder        Plan:  1. Continue patient on ABU protocol.  2.  Breathalyzer daily and Urine toxicology three times a week.  3.  VS daily 3 days a week  4.. Continue gabapentin 300mg TID  5. Continue Effexor XR 150mg daily         Jeanine Rodriguez MD

## 2018-05-07 NOTE — PLAN OF CARE
05/07/18 1000   Activity/Group Therapy Checklist   Group Educational  (family roles )   Attendance Attended   Follows Direction Followed directions   Group Interactions/Observations Interacted appropriately   Affect/Mood Range Normal range   Affect/Mood Display Appropriate   Goal Progression Progressing

## 2018-05-08 ENCOUNTER — HOSPITAL ENCOUNTER (OUTPATIENT)
Dept: PSYCHIATRY | Facility: HOSPITAL | Age: 43
Discharge: HOME | End: 2018-05-08
Attending: PSYCHIATRY & NEUROLOGY
Payer: COMMERCIAL

## 2018-05-08 DIAGNOSIS — F10.20 ALCOHOL USE DISORDER, SEVERE, DEPENDENCE: Primary | ICD-10-CM

## 2018-05-08 DIAGNOSIS — F33.2 MAJOR DEPRESSIVE DISORDER, RECURRENT SEVERE WITHOUT PSYCHOTIC FEATURES: ICD-10-CM

## 2018-05-08 LAB — BREATH ALCOHOL: 0

## 2018-05-08 PROCEDURE — 90853 GROUP PSYCHOTHERAPY: CPT | Mod: ,,, | Performed by: PSYCHOLOGIST

## 2018-05-08 PROCEDURE — 90853 GROUP PSYCHOTHERAPY: CPT | Performed by: SOCIAL WORKER

## 2018-05-08 PROCEDURE — 90853 GROUP PSYCHOTHERAPY: CPT

## 2018-05-08 NOTE — PLAN OF CARE
05/08/18 1400   Activity/Group Therapy Checklist   Group Goals/Reflection  (life story )   Attendance Attended   Follows Direction Followed directions   Group Interactions/Observations Interacted appropriately   Affect/Mood Range Normal range   Affect/Mood Display Appropriate   Goal Progression Progressing

## 2018-05-08 NOTE — PROGRESS NOTES
Group Psychotherapy (PhD/LCSW)    Site: [facility]    Clinical status of patient: Intensive Outpatient Program (IOP)    Date: 5/8/2018    Group Focus: ACT Group Psychotherapy    Length of service: 28200 - 45-50 minutes    Number of patients in attendance: 13    Referred by: Addictive Behavior Unit Treatment Team    Target symptoms: Alcohol Abuse    Patient's response to treatment: Active Listening and Self-disclosure    Progress toward goals: Progressing adequately    Interval History:  Session focus was Avoidance and Acceptance.  Patients were introduced to the costs of avoidance, values of acceptance, and willingness (using the head exercise).    Diagnosis: alcohol use disorder, severe, dependence    Plan: Continue treatment on ABU

## 2018-05-08 NOTE — PROGRESS NOTES
Group Psychotherapy (PhD/LCSW)    Site: [facility]    Clinical status of patient: Intensive Outpatient Program (IOP)    Date: 5/8/2018    Group Focus: Psychodynamic Group Psychotherapy    Length of service: 79800 - 45-50 minutes    Number of patients in attendance: 7    Referred by: Addictive Behavior Unit Treatment Team    Target symptoms: Alcohol Abuse    Patient's response to treatment: Active Listening and Self-disclosure    Progress toward goals: Progressing adequately    Interval History: Shared her story with new group member.    Diagnosis: alcohol use disorder, severe, dependence    Plan: Continue treatment on ABU

## 2018-05-08 NOTE — PROGRESS NOTES
Group Psychotherapy (PhD/LCSW)    Site: [facility]    Clinical status of patient: Intensive Outpatient Program (IOP)    Date: 5/8/2018    Group Focus: Disease Model of Addiction      Length of service: 31266 - 45-50 minutes    Number of patients in attendance: 7    Referred by: Addictive Behavior Unit Treatment Team    Target symptoms: Alcohol Abuse    Patient's response to treatment: Active Listening      Progress toward goals: Progressing adequately    Interval History: Discussed the basic neuropsychological concepts of the Disease Model of Addiction and their relationship to the phenomena of addiction (eg, powerlessness; euphoric recall, cravings, relapse, etc). Noted the way the Disease Model comports with the practices and principles of 12-step recovery. Emphasized the value of understanding the Disease Model for sustaining long-term sobriety.    Diagnosis: alcohol use disorder, severe, dependence    Plan: Continue treatment on ABU

## 2018-05-08 NOTE — PROGRESS NOTES
Group Psychotherapy (PhD/LCSW)    Site: [facility]    Clinical status of patient: Intensive Outpatient Program (IOP)    Date: 5/7/2018    Group Focus: Communication Skills       Length of service: 17720 - 45-50 minutes    Number of patients in attendance: 12    Referred by: Addictive Behavior Unit Treatment Team    Target symptoms: Alcohol Abuse    Patient's response to treatment: Active Listening      Progress toward goals: Progressing adequately    Interval History: Dicussed the concept of I-messages (vs You-Messages). Explained the value of the I-message for facilitating dialog, problem resolution, defining interpersonal boundaries, and communicating assertively. Demonstrated how to turn a You-message into an I-message. Modeled and role played the use of I-messages with everyday examples.      Diagnosis: alcohol use disorder, severe, dependence    Plan: Continue treatment on ABU

## 2018-05-09 ENCOUNTER — HOSPITAL ENCOUNTER (OUTPATIENT)
Dept: PSYCHIATRY | Facility: HOSPITAL | Age: 43
Discharge: HOME | End: 2018-05-09
Attending: PSYCHIATRY & NEUROLOGY
Payer: COMMERCIAL

## 2018-05-09 VITALS — SYSTOLIC BLOOD PRESSURE: 98 MMHG | HEART RATE: 93 BPM | DIASTOLIC BLOOD PRESSURE: 75 MMHG | TEMPERATURE: 98 F

## 2018-05-09 DIAGNOSIS — F10.20 ALCOHOL USE DISORDER, SEVERE, DEPENDENCE: Primary | ICD-10-CM

## 2018-05-09 DIAGNOSIS — F33.2 MAJOR DEPRESSIVE DISORDER, RECURRENT SEVERE WITHOUT PSYCHOTIC FEATURES: ICD-10-CM

## 2018-05-09 PROCEDURE — 99232 SBSQ HOSP IP/OBS MODERATE 35: CPT | Mod: ,,, | Performed by: PSYCHIATRY & NEUROLOGY

## 2018-05-09 PROCEDURE — 90853 GROUP PSYCHOTHERAPY: CPT | Performed by: SOCIAL WORKER

## 2018-05-09 PROCEDURE — 80307 DRUG TEST PRSMV CHEM ANLYZR: CPT

## 2018-05-09 PROCEDURE — 90853 GROUP PSYCHOTHERAPY: CPT | Mod: ,,, | Performed by: PSYCHIATRY & NEUROLOGY

## 2018-05-09 PROCEDURE — 90853 GROUP PSYCHOTHERAPY: CPT | Mod: ,,, | Performed by: PSYCHOLOGIST

## 2018-05-09 NOTE — PATIENT CARE CONFERENCE
Alcohol use disorder, severe  Substance-induced mood disorder     1. Pt is attending all groups    2. Pt is attending all meetings  3. Pt 's has minimally supportive family  4. Pt has completed spiritual assessment    5. Pt will present life story    6. Pt will present Step One assignment    7. Pt is exploring issues related to relapse  prevention; spirituality; stress management; improved communication skills; assertiveness training; poor self-esteem; disease concepts; cross addictions; and, work related issues    8. D/C date: 5/17/18     Staff discussed pt's reporting to tx consistently late. Staffing discussed pt does not display a full range of affect often constricted. Staffing discussed pt appearing superficial and lacking self reflection in groups.     Problem: Alcohol use Disorder, Severe  Goal: Address in 12 step meetings and group and individual sessions    Objective Measure: participation in groups, self report, length of sobriety, and relapse prevention plan  Time: Prior to discharge    Progress: Pt is attending groups and sessions     Problem: Substance-induced mood disorder   Goal: Address in 12 step meetings and group and individual sessions    Objective Measure: participation in groups, self report, length of sobriety, and relapse prevention plan  Time: Prior to discharge    Progress: Pt is attending groups and sessions        Staff members present:    MD Dr. Michael Madrigal MD, Resident  Dr. Simons, Ph.D.  Margie John, [facility]  Vinay Reyez [facility]

## 2018-05-09 NOTE — PROGRESS NOTES
Group Psychotherapy (PhD/LCSW)    Site: [facility]    Clinical status of patient: Intensive Outpatient Program (IOP)    Date: 5/9/2018    Group Focus: ACT Group Psychotherapy    Length of service: 97218 - 45-50 minutes    Number of patients in attendance: 13    Referred by: Addictive Behavior Unit Treatment Team    Target symptoms: Alcohol Abuse    Patient's response to treatment: Active Listening and Self-disclosure    Progress toward goals: Progressing adequately    Interval History:  Session focus was Avoidance and Acceptance.  Patients were encouraged to identify a pain-provoking target and generate scenarios to practice acceptance.    Diagnosis: alcohol use disorder, severe, dependence    Plan: Continue treatment on ABU

## 2018-05-09 NOTE — PROGRESS NOTES
Group Psychotherapy (PhD/LCSW)    Site: [facility]    Clinical status of patient: Intensive Outpatient Program (IOP)    Date: 5/9/2018    Group Focus: Psychodynamic Group Psychotherapy    Length of service: 88527 - 45-50 minutes    Number of patients in attendance: 7    Referred by: Addictive Behavior Unit Treatment Team    Target symptoms: Alcohol Abuse    Patient's response to treatment: Active Listening and Self-disclosure    Progress toward goals: Progressing adequately    Interval History:  Discussed mother-in-law coming to visit.    Diagnosis: alcohol use disorder, severe, dependence    Plan: Continue treatment on ABU

## 2018-05-09 NOTE — PROGRESS NOTES
Group Psychotherapy (MD)     Site: [facility]     Clinical status of patient: Intensive Outpatient Program (IOP)     Date: 5/9/17     Group Focus: Medical and Neuropsychiatric Bases of Psychiatric Disease and   Addiction: pain     Length of service: 91531 - 45-50 minutes     Number of patients in attendance: 13     Referred by: Addictive Behavioral Unit Treatment Team     Target symptoms: Pain     Patient's response to treatment: Active Listening       Interval History: Reviewed medical model of mood and anxiety disorders and   substance abuse, encouraged view of psychiatric disease through this model to   alleviate stigma and to identify helpful behaviors. Discussed pain in detail,   including neurological, medical and psychological aspects. Discussed subjective   nature of pain as well as multiple factors that play into pain. Utilized this   information to brainstorm ways pain can be treated outside of taking pain   medications. Highlighted risk of opioid use.     Progress toward goals: progressing adequately     Diagnosis: Alcohol Use disorder, severe, dependence     Plan: Continue treatment on ABU

## 2018-05-09 NOTE — PLAN OF CARE
05/09/18 1400   Activity/Group Therapy Checklist   Group Addiction Education   Attendance Attended   Follows Direction Followed directions   Group Interactions/Observations Interacted appropriately   Affect/Mood Range Normal range   Affect/Mood Display Appropriate   Goal Progression Progressing

## 2018-05-09 NOTE — PROGRESS NOTES
"Addiction Psychiatry Progress Note    ENCOUNTER DATE: 5/9/2018  SITE: [facility], [facility]    CHIEF COMPLAINT: alcohol use disorder  Patient is a 42 y.o. female, admitted on 4/5/2018 with principal problem of alcohol use disorder    SUBJECTIVE  Pt seen this afternoon. States covering avoidance and acceptance. Mother in law coming in. Pt not stressed and enjoys time with her but "his mom irritates the shit outta him." Sometimes he'll blow up and she'll get upset, but denies being nervous about this because "I've been there, done that."     States "everything about this program is very good" "It's teaching me how to process my emontions in a healthy way instead of drinking" citing liking mindfulness and I statements. Stated that she had been at her birthday looking at the wine wan was able to say to  "I'm really craving the wine," she resisted and later that evening  said "If you had had some I would have left. I think you're doing well in the program." Pt used I statements the next day regarding how his statement that he would have left if she had drank made her feel (does not elaborate on what those I statements were), and had a productive conversation where  voiced that he was worried that she wasn't talking with her sponsor enough and this was important to him because he was worried about the responsibility of being her sole support, pt states that she acted on this by speaking with her sponsor more frequently. PT discussed follow up plan, pt states she plans to join the aftercare program, go to AA as much as possible and talk with sponsor daily.     Consitutional: sleeping 5-6h/night, feels rested  Appetite: ok, normal  Medications: no problems with gabapentin, effexor: feels it is helpful for her anxiety. Does enquire about increasing dose in the future  Denied feeling suicidal or homicidal.      ROS  Denies any medical complaints  Constitutional: sleep " "ok    EXAMINATION    VITALS   There were no vitals filed for this visit.    CONSTITUTIONAL  General Appearance: age appropriate, neatly groomed, casually dressed     PSYCHIATRIC   Mental Status Exam:  Behavior/Cooperation: pleasant, cooperative, somewhat guarded  Speech: spontaneous, rate and volume appropriate   Mood: "I feel really good"  Affect: slightly less constricted, mood incongruent    Thought Process: goal oriented, linear   Thought Content: not suicidal or homicidal  Orientation: grossly intact  Memory: Grossly intact  Attention Span/Concentration: Normal  Insight: intact   Judgment: intact     MEDICAL DECISION MAKING    Diagnosis:  Alcohol use disorder-severe  Generalized Anxiety Disorder vs Substance Induced Mood Disorder        Plan:  1. Continue patient on ABU protocol.  2.  Breathalyzer daily and Urine toxicology three times a week.  3.  VS daily 3 days a week  4.. Continue gabapentin 300mg TID  5. Continue Effexor XR 150mg daily, last increased may 1        Jeanine Rodriguez MD    "

## 2018-05-10 ENCOUNTER — HOSPITAL ENCOUNTER (OUTPATIENT)
Dept: PSYCHIATRY | Facility: HOSPITAL | Age: 43
Discharge: HOME | End: 2018-05-10
Attending: PSYCHIATRY & NEUROLOGY
Payer: COMMERCIAL

## 2018-05-10 DIAGNOSIS — F33.2 MAJOR DEPRESSIVE DISORDER, RECURRENT SEVERE WITHOUT PSYCHOTIC FEATURES: ICD-10-CM

## 2018-05-10 DIAGNOSIS — F10.20 ALCOHOL USE DISORDER, SEVERE, DEPENDENCE: Primary | ICD-10-CM

## 2018-05-10 LAB
AMPHET+METHAMPHET UR QL: NEGATIVE
BARBITURATES UR QL SCN>200 NG/ML: NEGATIVE
BENZODIAZ UR QL SCN>200 NG/ML: NEGATIVE
BREATH ALCOHOL: 0
BZE UR QL SCN: NEGATIVE
CANNABINOIDS UR QL SCN: NEGATIVE
CREAT UR-MCNC: 53 MG/DL
ETHANOL UR-MCNC: <10 MG/DL
METHADONE UR QL SCN>300 NG/ML: NEGATIVE
OPIATES UR QL SCN: NEGATIVE
PCP UR QL SCN>25 NG/ML: NEGATIVE
TOXICOLOGY INFORMATION: NORMAL

## 2018-05-10 PROCEDURE — 90853 GROUP PSYCHOTHERAPY: CPT | Mod: ,,, | Performed by: PSYCHOLOGIST

## 2018-05-10 PROCEDURE — 90853 GROUP PSYCHOTHERAPY: CPT

## 2018-05-10 PROCEDURE — 90853 GROUP PSYCHOTHERAPY: CPT | Performed by: SOCIAL WORKER

## 2018-05-10 PROCEDURE — 80307 DRUG TEST PRSMV CHEM ANLYZR: CPT

## 2018-05-10 NOTE — PROGRESS NOTES
Group Psychotherapy (PhD/LCSW)    Site: [facility]    Clinical status of patient: Intensive Outpatient Program (IOP)    Date: 5/10/2018    Group Focus: ACT Group Psychotherapy    Length of service: 31816 - 45-50 minutes    Number of patients in attendance: 13    Referred by: Addictive Behavior Unit Treatment Team    Target symptoms: Alcohol Abuse    Patient's response to treatment: Active Listening and Self-disclosure    Progress toward goals: Progressing adequately    Interval History:  Session focus was Acceptance.  Patients were encouraged to promote acceptance in a writing exercise in which they practiced acceptance of themselves, others, and memories.     Diagnosis: alcohol use disorder, severe, dependence    Plan: Continue treatment on ABU

## 2018-05-10 NOTE — PLAN OF CARE
05/10/18 1400   Activity/Group Therapy Checklist   Group Relapse Prevention  (Step Work )   Attendance Attended   Follows Direction Followed directions   Group Interactions/Observations Interacted appropriately   Affect/Mood Range Normal range   Affect/Mood Display Appropriate   Goal Progression Progressing

## 2018-05-10 NOTE — PROGRESS NOTES
Group Psychotherapy (PhD/LCSW)    Site: [facility]    Clinical status of patient: Intensive Outpatient Program (IOP)    Date: 5/10/2018    Group Focus: Psychodynamic Group Psychotherapy    Length of service: 72952 - 45-50 minutes    Number of patients in attendance: 7    Referred by: Addictive Behavior Unit Treatment Team    Target symptoms: Alcohol Abuse    Patient's response to treatment: Active Listening and Self-disclosure    Progress toward goals: Progressing adequately    Interval History:  Discussed being irritated by comments from , mother-in-law, and  about her sobriety.    Diagnosis: alcohol use disorder, severe, dependence    Plan: Continue treatment on ABU

## 2018-05-10 NOTE — PLAN OF CARE
05/10/18 1000   Activity/Group Therapy Checklist   Group Addiction Education   Attendance Attended   Follows Direction Followed directions   Group Interactions/Observations Interacted appropriately   Affect/Mood Range Normal range   Affect/Mood Display Appropriate   Goal Progression Progressing

## 2018-05-11 ENCOUNTER — HOSPITAL ENCOUNTER (OUTPATIENT)
Dept: PSYCHIATRY | Facility: HOSPITAL | Age: 43
Discharge: HOME | End: 2018-05-11
Attending: PSYCHIATRY & NEUROLOGY
Payer: COMMERCIAL

## 2018-05-11 VITALS — SYSTOLIC BLOOD PRESSURE: 95 MMHG | HEART RATE: 84 BPM | DIASTOLIC BLOOD PRESSURE: 68 MMHG

## 2018-05-11 DIAGNOSIS — F10.20 ALCOHOL USE DISORDER, SEVERE, DEPENDENCE: Primary | ICD-10-CM

## 2018-05-11 DIAGNOSIS — F33.2 MAJOR DEPRESSIVE DISORDER, RECURRENT SEVERE WITHOUT PSYCHOTIC FEATURES: ICD-10-CM

## 2018-05-11 LAB
AMPHET+METHAMPHET UR QL: NEGATIVE
AMPHET+METHAMPHET UR QL: NEGATIVE
BARBITURATES UR QL SCN>200 NG/ML: NEGATIVE
BARBITURATES UR QL SCN>200 NG/ML: NEGATIVE
BENZODIAZ UR QL SCN>200 NG/ML: NEGATIVE
BENZODIAZ UR QL SCN>200 NG/ML: NEGATIVE
BREATH ALCOHOL: 0
BZE UR QL SCN: NEGATIVE
BZE UR QL SCN: NEGATIVE
CANNABINOIDS UR QL SCN: NEGATIVE
CANNABINOIDS UR QL SCN: NEGATIVE
CREAT UR-MCNC: 145 MG/DL
CREAT UR-MCNC: 53 MG/DL
ETHANOL UR-MCNC: <10 MG/DL
ETHANOL UR-MCNC: <10 MG/DL
METHADONE UR QL SCN>300 NG/ML: NEGATIVE
METHADONE UR QL SCN>300 NG/ML: NEGATIVE
OPIATES UR QL SCN: NEGATIVE
OPIATES UR QL SCN: NEGATIVE
PCP UR QL SCN>25 NG/ML: NEGATIVE
PCP UR QL SCN>25 NG/ML: NEGATIVE
TOXICOLOGY INFORMATION: NORMAL
TOXICOLOGY INFORMATION: NORMAL

## 2018-05-11 PROCEDURE — 90853 GROUP PSYCHOTHERAPY: CPT | Performed by: SOCIAL WORKER

## 2018-05-11 PROCEDURE — 90853 GROUP PSYCHOTHERAPY: CPT | Mod: ,,, | Performed by: PSYCHOLOGIST

## 2018-05-11 PROCEDURE — 99232 SBSQ HOSP IP/OBS MODERATE 35: CPT | Mod: ,,, | Performed by: PSYCHIATRY & NEUROLOGY

## 2018-05-11 PROCEDURE — 80307 DRUG TEST PRSMV CHEM ANLYZR: CPT

## 2018-05-11 RX ORDER — NALTREXONE HYDROCHLORIDE 50 MG/1
50 TABLET, FILM COATED ORAL DAILY
Qty: 90 TABLET | Refills: 0 | Status: SHIPPED | OUTPATIENT
Start: 2018-05-11 | End: 2018-06-10

## 2018-05-11 RX ORDER — VENLAFAXINE HYDROCHLORIDE 225 MG/1
225 TABLET, EXTENDED RELEASE ORAL DAILY
Qty: 90 EACH | Refills: 0 | Status: SHIPPED | OUTPATIENT
Start: 2018-05-11 | End: 2019-05-11

## 2018-05-11 NOTE — PLAN OF CARE
05/11/18 1000   Activity/Group Therapy Checklist   Group Goals/Reflection  (life story )   Attendance Attended   Follows Direction Followed directions   Group Interactions/Observations Interacted appropriately   Affect/Mood Range Normal range   Affect/Mood Display Appropriate   Goal Progression Progressing

## 2018-05-11 NOTE — PLAN OF CARE
05/11/18 1400   Activity/Group Therapy Checklist   Group Addiction Education  (Defense Mechanisms)   Attendance Attended   Follows Direction Followed directions   Group Interactions/Observations Interacted appropriately   Affect/Mood Range Normal range   Affect/Mood Display Appropriate   Goal Progression Progressing

## 2018-05-11 NOTE — PROGRESS NOTES
Group Psychotherapy (PhD/LCSW)    Site: [facility]    Clinical status of patient: Intensive Outpatient Program (IOP)    Date: 5/11/2018    Group Focus: Stress Management    Length of service: 06913 - 45-50 minutes    Number of patients in attendance: 13    Referred by: Addictive Behavior Unit Treatment Team    Target symptoms: Alcohol Abuse    Patient's response to treatment: Active Listening    Progress toward goals: Progressing adequately    Interval History: Group learned mindfulness techniques (sound, breathing, stretching) to improve present-moment awareness, impulsive behavior tendencies, and tolerance of various emotional states.    Diagnosis: Alcohol Use Disorder    Plan: Continue treatment on ABU

## 2018-05-11 NOTE — PROGRESS NOTES
"Addiction Psychiatry Progress Note    ENCOUNTER DATE: 5/11/2018  SITE: [facility]    CHIEF COMPLAINT: alcohol use disorder  Patient is a 42 y.o. female, admitted on 4/5/2018 with principal problem of alcohol use disorder    SUBJECTIVE  Pt seen this afternoon. Mother in law came in, today is husbands birthday, low stress so far. Reports mood is "ok," sobriety is "good." Talks with sponsor daily, going over the big book with her, working on step 4, knows there is a workbook that she should be getting soon.      States she is trying to get her ducks in a row prior to finishing the program that she is wanting to take a week off prior to getting back to work then to start part time and work up to full time. States that she has antabuse at home, and that if team thinks she should take it she'll take it. Discussed the difference between antabuse and naltrexone.    Consitutional: sleeping 5-6h/night, feels rested  Appetite: ok, normal  Medications: no problems with gabapentin, effexor: feels it is helpful for her anxiety. Does enquire about increasing dose in the future   Denied feeling suicidal or homicidal.      ROS  Denies any medical complaints  Constitutional: sleep ok    EXAMINATION    VITALS   Vitals:    05/11/18 0900   BP: 95/68   Pulse: 84       CONSTITUTIONAL  General Appearance: age appropriate, neatly groomed, casually dressed     PSYCHIATRIC   Mental Status Exam:  Behavior/Cooperation: pleasant, cooperative, somewhat guarded  Speech: spontaneous, rate and volume appropriate   Mood: "good"  Affect: slightly less constricted, mood incongruent    Thought Process: goal oriented, linear   Thought Content: not suicidal or homicidal  Orientation: grossly intact  Memory: Grossly intact  Attention Span/Concentration: Normal  Insight: intact   Judgment: intact     MEDICAL DECISION MAKING    Diagnosis:  Alcohol use disorder-severe  Generalized Anxiety Disorder vs Substance Induced Mood Disorder "        Plan:  1. Continue patient on ABU protocol.  2.  Breathalyzer daily and Urine toxicology three times a week.  3.  VS daily 3 days a week  4. Continue gabapentin 300mg TID  5. Increase Venlafaxine XR to 225mg daily as of 5/11  6. Starting naltrexone 50mg jorge luis Rodriguez MD

## 2018-05-11 NOTE — PROGRESS NOTES
"Group Psychotherapy (PhD/LCSW)    Site: [facility]    Clinical status of patient: Intensive Outpatient Program (IOP)    Date: 5/11/2018    Group Focus: Psychodynamic Group Psychotherapy    Length of service: 12141 - 45-50 minutes    Number of patients in attendance: 6    Referred by: Addictive Behavior Unit Treatment Team    Target symptoms: Alcohol Abuse    Patient's response to treatment: Active Listening and Self-disclosure    Progress toward goals: Progressing adequately    Interval History:  Discussed the ways being in recovery affects one's perception of oneself and one's  Relationships. Pt noted that she is regaining a clarity of mind and that she is learning how to avoid getting so "worked up" over things when they don't go her way.     Diagnosis: alcohol use disorder, severe, dependence    Plan: Continue treatment on ABU        "

## 2018-05-14 ENCOUNTER — HOSPITAL ENCOUNTER (OUTPATIENT)
Dept: PSYCHIATRY | Facility: HOSPITAL | Age: 43
Discharge: HOME | End: 2018-05-14
Attending: PSYCHIATRY & NEUROLOGY
Payer: COMMERCIAL

## 2018-05-14 VITALS — SYSTOLIC BLOOD PRESSURE: 91 MMHG | DIASTOLIC BLOOD PRESSURE: 70 MMHG | HEART RATE: 91 BPM

## 2018-05-14 DIAGNOSIS — F33.2 MAJOR DEPRESSIVE DISORDER, RECURRENT SEVERE WITHOUT PSYCHOTIC FEATURES: ICD-10-CM

## 2018-05-14 DIAGNOSIS — F10.20 ALCOHOL USE DISORDER, SEVERE, DEPENDENCE: Primary | ICD-10-CM

## 2018-05-14 PROCEDURE — 90853 GROUP PSYCHOTHERAPY: CPT

## 2018-05-14 PROCEDURE — 80307 DRUG TEST PRSMV CHEM ANLYZR: CPT

## 2018-05-14 PROCEDURE — 99232 SBSQ HOSP IP/OBS MODERATE 35: CPT | Mod: ,,, | Performed by: PSYCHIATRY & NEUROLOGY

## 2018-05-14 PROCEDURE — 90853 GROUP PSYCHOTHERAPY: CPT | Mod: 59,,, | Performed by: PSYCHOLOGIST

## 2018-05-14 PROCEDURE — 90853 GROUP PSYCHOTHERAPY: CPT | Performed by: SOCIAL WORKER

## 2018-05-14 NOTE — PROGRESS NOTES
"Addiction Psychiatry Progress Note    ENCOUNTER DATE: 5/14/2018  SITE: [facility]    CHIEF COMPLAINT: alcohol use disorder  Patient is a 42 y.o. female, admitted on 4/5/2018 with principal problem of alcohol use disorder    SUBJECTIVE  Pt reports that increased dose of Effexor has been helpful, anxiety level is "really good". Pt reports that sleep and appetite are both stable. Pt reports that her energy level is improved with ongoing sobriety. Pt started naltrexone last week, not having any negative effects.   Pt reports that she is ready to transition back to work, plans to make contact with job early this week . Asked appropriate questions about her aftercare. Pt planning to maintain psychiatric care within the [facility] in addition to the weekly aftercare groups.     Consitutional: sleep patterns are normal   Appetite: good  Medications: denied having any negative medication effects.    ROS  Denies any medical complaints  Constitutional: sleep ok    EXAMINATION    VITALS   Vitals:    05/14/18 1100   BP: 91/70   Pulse: 91       CONSTITUTIONAL  General Appearance: appears stated age, casual clothing     PSYCHIATRIC   Mental Status Exam:  Behavior/Cooperation: cooperative but guarded   Speech: mostly non-spontaneous, rate and volume appropriate when speaking   Mood: "really good"  Affect: mood incongruent, constricted   Thought Process: logical, linear   Thought Content: no delusional thought content, future oriented   Orientation: grossly intact  Memory: Grossly intact  Attention Span/Concentration: Normal  Insight: intact   Judgment: intact     MEDICAL DECISION MAKING    Diagnosis:  Alcohol use disorder-severe  Generalized Anxiety Disorder vs Substance Induced Mood Disorder        Plan:  1. Continue patient on ABU protocol.  2.  Breathalyzer daily and Urine toxicology three times a week.  3.  VS daily 3 days a week  4. Continue gabapentin 300mg TID  5. Continue with Venlafaxine XR " 225mg daily and gabapentin 300mg tid   6. Continue naltrexone 50mg qam      Case seen with Dr. Bethea.     Sabrina Thayer MD   [facility] PGY5

## 2018-05-14 NOTE — PROGRESS NOTES
Group Psychotherapy (PhD/LCSW)    Site: [facility]    Clinical status of patient: Intensive Outpatient Program (IOP)    Date: 5/14/2018    Group Focus: Psychodynamic Group Psychotherapy    Length of service: 81293 - 45-50 minutes    Number of patients in attendance: 7    Referred by: Addictive Behavior Unit Treatment Team    Target symptoms: Alcohol Abuse    Patient's response to treatment: Active Listening and Self-disclosure    Progress toward goals: Progressing adequately    Interval History:  Pt shared her story with a new group member       Diagnosis: alcohol use disorder, severe, dependence    Plan: Continue treatment on ABU

## 2018-05-14 NOTE — PLAN OF CARE
05/14/18 1400   Activity/Group Therapy Checklist   Group Relapse Prevention   Attendance Attended   Follows Direction Followed directions   Group Interactions/Observations Interacted appropriately;Sharing;Supportive   Affect/Mood Range Normal range   Affect/Mood Display Appropriate   Goal Progression Progressing

## 2018-05-14 NOTE — PROGRESS NOTES
Group Psychotherapy (PhD/LCSW)    Site: [facility]    Clinical status of patient: Intensive Outpatient Program (IOP)    Date: 5/14/2018    Group Focus: Communication Skills       Length of service: 85342 - 45-50 minutes    Number of patients in attendance: 7    Referred by: Addictive Behavior Unit Treatment Team    Target symptoms: Alcohol Abuse    Patient's response to treatment: Active Listening      Progress toward goals: Progressing adequately    Interval History:  Discussed basic communication skills (I-messages, Reflective Listening, and contextual considerations). Illustrated their value through anecdotes, modeling, and role play of current problems affecting group members. .     Diagnosis: alcohol use disorder, severe, dependence    Plan: Continue treatment on ABU

## 2018-05-14 NOTE — PLAN OF CARE
05/14/18 1000   Activity/Group Therapy Checklist   Group Relapse Prevention   Attendance Attended   Follows Direction Followed directions   Group Interactions/Observations Interacted appropriately   Affect/Mood Range Normal range   Affect/Mood Display Appropriate   Goal Progression Progressing

## 2018-05-15 ENCOUNTER — HOSPITAL ENCOUNTER (OUTPATIENT)
Dept: PSYCHIATRY | Facility: HOSPITAL | Age: 43
Discharge: HOME | End: 2018-05-15
Attending: PSYCHIATRY & NEUROLOGY
Payer: COMMERCIAL

## 2018-05-15 DIAGNOSIS — F33.2 MAJOR DEPRESSIVE DISORDER, RECURRENT SEVERE WITHOUT PSYCHOTIC FEATURES: ICD-10-CM

## 2018-05-15 DIAGNOSIS — F10.20 ALCOHOL USE DISORDER, SEVERE, DEPENDENCE: Primary | ICD-10-CM

## 2018-05-15 LAB
AMPHET+METHAMPHET UR QL: NEGATIVE
BARBITURATES UR QL SCN>200 NG/ML: NEGATIVE
BENZODIAZ UR QL SCN>200 NG/ML: NEGATIVE
BREATH ALCOHOL: 0
BZE UR QL SCN: NEGATIVE
CANNABINOIDS UR QL SCN: NEGATIVE
CREAT UR-MCNC: 201 MG/DL
ETHANOL UR-MCNC: <10 MG/DL
METHADONE UR QL SCN>300 NG/ML: NEGATIVE
OPIATES UR QL SCN: NEGATIVE
PCP UR QL SCN>25 NG/ML: NEGATIVE
TOXICOLOGY INFORMATION: NORMAL

## 2018-05-15 PROCEDURE — 90853 GROUP PSYCHOTHERAPY: CPT | Mod: ,,, | Performed by: PSYCHOLOGIST

## 2018-05-15 PROCEDURE — 90853 GROUP PSYCHOTHERAPY: CPT | Performed by: SOCIAL WORKER

## 2018-05-15 PROCEDURE — 90853 GROUP PSYCHOTHERAPY: CPT

## 2018-05-15 NOTE — PROGRESS NOTES
Group Psychotherapy (PhD/LCSW)    Site: [facility]    Clinical status of patient: Intensive Outpatient Program (IOP)    Date: 5/15/2018    Group Focus: Psychodynamic Group Psychotherapy    Length of service: 20808 - 45-50 minutes    Number of patients in attendance: 6    Referred by: Addictive Behavior Unit Treatment Team    Target symptoms: Alcohol Abuse    Patient's response to treatment: Active Listening and Self-disclosure    Progress toward goals: Progressing adequately    Interval History:  Discussed anxiety being improved. Not feeling well today.    Diagnosis: alcohol use disorder, severe, dependence    Plan: Continue treatment on ABU

## 2018-05-15 NOTE — PROGRESS NOTES
Group Psychotherapy (PhD/LCSW)    Site: [facility]    Clinical status of patient: Intensive Outpatient Program (IOP)    Date: 5/15/2018    Group Focus: ACT Group Psychotherapy    Length of service: 73612 - 45-50 minutes    Number of patients in attendance: 12    Referred by: Addictive Behavior Unit Treatment Team    Target symptoms: Alcohol Abuse    Patient's response to treatment: Active Listening and Self-disclosure    Progress toward goals: Progressing adequately    Interval History:  Session focus was Values.  Patients were introduced to values and provided with the values compass to complete.  Each patient discussed a value that is most important to focus on at this time.     Diagnosis: alcohol use disorder, severe, dependence    Plan: Continue treatment on ABU

## 2018-05-15 NOTE — PLAN OF CARE
05/15/18 1400   Activity/Group Therapy Checklist   Group Educational  (communication roadblocks  )   Attendance Attended   Follows Direction Followed directions   Group Interactions/Observations Interacted appropriately   Affect/Mood Range Normal range   Affect/Mood Display Appropriate   Goal Progression Progressing

## 2018-05-15 NOTE — PROGRESS NOTES
Group Psychotherapy (PhD/LCSW)    [facility]    Clinical status of patient: Intensive Outpatient Program (IOP)    Date: 5/15/2018    Group Focus: Disease Model of Addiction      Length of service: 99032 - 45-50 minutes    Number of patients in attendance: 6    Referred by: Addictive Behavior Unit Treatment Team    Target symptoms: Alcohol Abuse    Patient's response to treatment: Active Listening     Progress toward goals: Progressing adequately    Interval History:  Discussed the basic neuropsychological concepts of the Disease Model of Addiction and their relationship to the phenomena of addiction including : powerlessness, cravings, euphoric recall, tolerance, and relapse. Noted the value of understanding the Disease Model for sustaining long-term abstinence. .     Diagnosis: alcohol use disorder, severe, dependence    Plan: Continue treatment on ABU

## 2018-05-16 ENCOUNTER — HOSPITAL ENCOUNTER (OUTPATIENT)
Dept: PSYCHIATRY | Facility: HOSPITAL | Age: 43
Discharge: HOME | End: 2018-05-16
Attending: PSYCHIATRY & NEUROLOGY
Payer: COMMERCIAL

## 2018-05-16 VITALS — DIASTOLIC BLOOD PRESSURE: 69 MMHG | HEART RATE: 84 BPM | SYSTOLIC BLOOD PRESSURE: 91 MMHG

## 2018-05-16 DIAGNOSIS — F10.20 ALCOHOL USE DISORDER, SEVERE, DEPENDENCE: Primary | ICD-10-CM

## 2018-05-16 DIAGNOSIS — F33.2 MAJOR DEPRESSIVE DISORDER, RECURRENT SEVERE WITHOUT PSYCHOTIC FEATURES: ICD-10-CM

## 2018-05-16 LAB
AMPHET+METHAMPHET UR QL: NEGATIVE
BARBITURATES UR QL SCN>200 NG/ML: NEGATIVE
BENZODIAZ UR QL SCN>200 NG/ML: NEGATIVE
BREATH ALCOHOL: 0
BZE UR QL SCN: NEGATIVE
CANNABINOIDS UR QL SCN: NEGATIVE
CREAT UR-MCNC: 55 MG/DL
ETHANOL UR-MCNC: <10 MG/DL
METHADONE UR QL SCN>300 NG/ML: NEGATIVE
OPIATES UR QL SCN: NEGATIVE
PCP UR QL SCN>25 NG/ML: NEGATIVE
TOXICOLOGY INFORMATION: NORMAL

## 2018-05-16 PROCEDURE — 90853 GROUP PSYCHOTHERAPY: CPT

## 2018-05-16 PROCEDURE — 90853 GROUP PSYCHOTHERAPY: CPT | Mod: ,,, | Performed by: PSYCHOLOGIST

## 2018-05-16 PROCEDURE — 90853 GROUP PSYCHOTHERAPY: CPT | Mod: ,,, | Performed by: PSYCHIATRY & NEUROLOGY

## 2018-05-16 PROCEDURE — 80307 DRUG TEST PRSMV CHEM ANLYZR: CPT

## 2018-05-16 PROCEDURE — 90853 GROUP PSYCHOTHERAPY: CPT | Performed by: SOCIAL WORKER

## 2018-05-16 NOTE — PATIENT CARE CONFERENCE
Alcohol use disorder, severe  Substance-induced mood disorder     1. Pt is attending all groups    2. Pt is attending all meetings  3. Pt 's has minimally supportive family  4. Pt has completed spiritual assessment    5. Pt will present life story    6. Pt will present Step One assignment    7. Pt is exploring issues related to relapse  prevention; spirituality; stress management; improved communication skills; assertiveness training; poor self-esteem; disease concepts; cross addictions; and, work related issues    8. D/C date: 5/17/18     Staff discussed pt appearing superficial and decreased concentration during group, but increase self-reflection about recovery. Staff discussed pt's life story and psychosocial hx related to father's death and mother's illness, strained relationship with siblings, and health of . Staff discussed pt occupation and pt benefiting from going back to work first as part time. Staff discussed desire for individual and marital counseling. Staff discussed aftercare plans related to attending Dr. Anna's aftercare group and possible referral to Dr. Barboza or Dr. Willams.     Problem: Alcohol use Disorder, Severe  Goal: Address in 12 step meetings and group and individual sessions    Objective Measure: participation in groups, self report, length of sobriety, and relapse prevention plan  Time: Prior to discharge    Progress: Pt is attending groups and sessions     Problem: Substance-induced mood disorder   Goal: Address in 12 step meetings and group and individual sessions    Objective Measure: participation in groups, self report, length of sobriety, and relapse prevention plan  Time: Prior to discharge    Progress: Pt is attending groups and sessions        Staff members present:    MD Dr. Jeovany Madrigal MD Fellow   Dr. Michael MD Resident  Dr. Simons, Ph.D.  Margie John, [facility]  José Miguel Brennan, [facility]   Vinay Reyez, [facility]

## 2018-05-16 NOTE — PLAN OF CARE
05/16/18 1400   Activity/Group Therapy Checklist   Group Relapse Prevention   Attendance Attended   Follows Direction Followed directions   Group Interactions/Observations Interacted appropriately   Affect/Mood Range Normal range   Affect/Mood Display Appropriate   Goal Progression Progressing

## 2018-05-16 NOTE — PROGRESS NOTES
Group Psychotherapy (PhD/LCSW)    Site: [facility]    Clinical status of patient: Intensive Outpatient Program (IOP)    Date: 5/16/2018    Group Focus: Psychodynamic Group Psychotherapy    Length of service: 18490 - 45-50 minutes    Number of patients in attendance: 8    Referred by: Addictive Behavior Unit Treatment Team    Target symptoms: Alcohol Abuse    Patient's response to treatment: Active Listening and Self-disclosure    Progress toward goals: Progressing adequately    Interval History:  Shared her story with new group member.    Diagnosis: alcohol use disorder, severe, dependence    Plan: Continue treatment on ABU

## 2018-05-17 ENCOUNTER — HOSPITAL ENCOUNTER (OUTPATIENT)
Dept: PSYCHIATRY | Facility: HOSPITAL | Age: 43
Discharge: HOME | End: 2018-05-17
Attending: PSYCHIATRY & NEUROLOGY
Payer: COMMERCIAL

## 2018-05-17 DIAGNOSIS — F33.2 MAJOR DEPRESSIVE DISORDER, RECURRENT SEVERE WITHOUT PSYCHOTIC FEATURES: Primary | ICD-10-CM

## 2018-05-17 DIAGNOSIS — F10.20 ALCOHOL USE DISORDER, SEVERE, DEPENDENCE: Primary | ICD-10-CM

## 2018-05-17 DIAGNOSIS — F33.2 MAJOR DEPRESSIVE DISORDER, RECURRENT SEVERE WITHOUT PSYCHOTIC FEATURES: ICD-10-CM

## 2018-05-17 DIAGNOSIS — F10.20 ALCOHOL USE DISORDER, SEVERE, DEPENDENCE: ICD-10-CM

## 2018-05-17 PROCEDURE — 90853 GROUP PSYCHOTHERAPY: CPT | Performed by: SOCIAL WORKER

## 2018-05-17 PROCEDURE — 99232 SBSQ HOSP IP/OBS MODERATE 35: CPT | Mod: ,,, | Performed by: PSYCHIATRY & NEUROLOGY

## 2018-05-17 PROCEDURE — 90853 GROUP PSYCHOTHERAPY: CPT

## 2018-05-17 PROCEDURE — 90853 GROUP PSYCHOTHERAPY: CPT | Mod: ,,, | Performed by: PSYCHOLOGIST

## 2018-05-17 RX ORDER — VENLAFAXINE HYDROCHLORIDE 225 MG/1
1 TABLET, EXTENDED RELEASE ORAL DAILY
Qty: 30 EACH | Refills: 1 | Status: SHIPPED | OUTPATIENT
Start: 2018-05-17 | End: 2019-05-17

## 2018-05-17 RX ORDER — GABAPENTIN 300 MG/1
300 CAPSULE ORAL 3 TIMES DAILY
Qty: 90 CAPSULE | Refills: 1 | Status: ON HOLD | OUTPATIENT
Start: 2018-05-17 | End: 2020-01-24

## 2018-05-17 RX ORDER — NALTREXONE HYDROCHLORIDE 50 MG/1
50 TABLET, FILM COATED ORAL DAILY
Qty: 30 TABLET | Refills: 1 | Status: SHIPPED | OUTPATIENT
Start: 2018-05-17 | End: 2018-06-16

## 2018-05-17 NOTE — PLAN OF CARE
05/17/18 1000   Activity/Group Therapy Checklist   Group Goals/Reflection  (life story )   Attendance Attended   Follows Direction Followed directions   Group Interactions/Observations Interacted appropriately   Affect/Mood Range Normal range   Affect/Mood Display Appropriate   Goal Progression Progressing

## 2018-05-17 NOTE — DISCHARGE SUMMARY
Name: Brooke Schwartz  : 1975  MRN: [id number]  DOA: 2018   DOD: 2018  Attending physician:  Clarice Bethea MD  Resident: Sabrina Thayer MD    Addiction Behavioral Unit Intensive Outpatient Program  Discharge Summary    Diagnosis:  Patient Active Problem List   Diagnosis    Alcohol use disorder, severe, dependence    Major depressive disorder, recurrent severe without psychotic features    Medical clearance for psychiatric admission       Program course/treatments:  Patient started ABU on 2018. Patient valium was discontinued and celexa continued.  Pt celexa was tapered over two weeks and effexor started at 37.5mg. Pt effexor increased to 225mg daily over course of ABU participation. Pt also started on gabapentin 300mg tid as adjunct for anxiety and naltrexone 50mg daily for alcohol use disorder. Pt  tolerated the medication well without any side effects. Patient was breathalyzed daily and gave a urine toxicology three times a week. Patient attended AA meetings daily during her time in the program and was able to get a sponsor. She completed the program without relapse. Pt was placed on alert for repeated tardiness.  She plans to continue attending AA meetings and plans to remain in contact with sponsor. Aftercare plan includes weekly aftercare groups with Dr. Anna, psychiatric follow-up with Dr. Bethea.     Labs:  Recent Results (from the past 840 hour(s))   POCT BREATH ALCOHOL TEST    Collection Time: 18 11:25 AM   Result Value Ref Range    Breath Alcohol ..000    Toxicology screen, urine    Collection Time: 18 10:26 AM   Result Value Ref Range    Alcohol, Urine <10 <10 mg/dL    Benzodiazepines Presumptive Positive     Methadone metabolites Negative     Cocaine (Metab.) Negative     Opiate Scrn, Ur Negative     Barbiturate Screen, Ur Negative     Amphetamine Screen, Ur Negative     THC Negative     Phencyclidine Negative     Creatinine, Random Ur 11.0 (L) 15.0 - 325.0 mg/dL     Toxicology Information SEE COMMENT    POCT BREATH ALCOHOL TEST    Collection Time: 04/13/18 10:26 AM   Result Value Ref Range    Breath Alcohol 0.000    Toxicology screen, urine    Collection Time: 04/16/18 12:24 PM   Result Value Ref Range    Alcohol, Urine <10 <10 mg/dL    Benzodiazepines Presumptive Positive     Methadone metabolites Negative     Cocaine (Metab.) Negative     Opiate Scrn, Ur Negative     Barbiturate Screen, Ur Negative     Amphetamine Screen, Ur Negative     THC Negative     Phencyclidine Negative     Creatinine, Random Ur 16.0 15.0 - 325.0 mg/dL    Toxicology Information SEE COMMENT    Alcohol Biomarkers, urine    Collection Time: 04/16/18 12:25 PM   Result Value Ref Range    Ethyl Glucuronide NEGATIVE    POCT BREATH ALCOHOL TEST    Collection Time: 04/16/18 12:25 PM   Result Value Ref Range    Breath Alcohol 0.000    POCT BREATH ALCOHOL TEST    Collection Time: 04/17/18 10:24 AM   Result Value Ref Range    Breath Alcohol 0.000    Toxicology screen, urine    Collection Time: 04/18/18 10:29 AM   Result Value Ref Range    Alcohol, Urine <10 <10 mg/dL    Benzodiazepines Presumptive Positive     Methadone metabolites Negative     Cocaine (Metab.) Negative     Opiate Scrn, Ur Negative     Barbiturate Screen, Ur Negative     Amphetamine Screen, Ur Negative     THC Negative     Phencyclidine Negative     Creatinine, Random Ur 18.0 15.0 - 325.0 mg/dL    Toxicology Information SEE COMMENT    POCT BREATH ALCOHOL TEST    Collection Time: 04/18/18 10:29 AM   Result Value Ref Range    Breath Alcohol 0.000    POCT BREATH ALCOHOL TEST    Collection Time: 04/19/18 12:16 PM   Result Value Ref Range    Breath Alcohol 0.000    Toxicology screen, urine    Collection Time: 04/20/18 10:43 AM   Result Value Ref Range    Alcohol, Urine <10 <10 mg/dL    Benzodiazepines Presumptive Positive     Methadone metabolites Negative     Cocaine (Metab.) Negative     Opiate Scrn, Ur Negative     Barbiturate Screen, Ur Negative      Amphetamine Screen, Ur Negative     THC Negative     Phencyclidine Negative     Creatinine, Random Ur 33.0 15.0 - 325.0 mg/dL    Toxicology Information SEE COMMENT    POCT BREATH ALCOHOL TEST    Collection Time: 04/20/18 10:43 AM   Result Value Ref Range    Breath Alcohol 0.000    Toxicology screen, urine    Collection Time: 04/23/18  1:55 PM   Result Value Ref Range    Alcohol, Urine <10 <10 mg/dL    Benzodiazepines Negative     Methadone metabolites Negative     Cocaine (Metab.) Negative     Opiate Scrn, Ur Negative     Barbiturate Screen, Ur Negative     Amphetamine Screen, Ur Negative     THC Negative     Phencyclidine Negative     Creatinine, Random Ur 10.0 (L) 15.0 - 325.0 mg/dL    Toxicology Information SEE COMMENT    POCT BREATH ALCOHOL TEST    Collection Time: 04/24/18 11:45 AM   Result Value Ref Range    Breath Alcohol 0.000    Toxicology screen, urine    Collection Time: 04/25/18 11:42 AM   Result Value Ref Range    Alcohol, Urine <10 <10 mg/dL    Benzodiazepines Negative     Methadone metabolites Negative     Cocaine (Metab.) Negative     Opiate Scrn, Ur Negative     Barbiturate Screen, Ur Negative     Amphetamine Screen, Ur Negative     THC Negative     Phencyclidine Negative     Creatinine, Random Ur 37.0 15.0 - 325.0 mg/dL    Toxicology Information SEE COMMENT    POCT BREATH ALCOHOL TEST    Collection Time: 04/25/18 11:42 AM   Result Value Ref Range    Breath Alcohol 0.000    POCT BREATH ALCOHOL TEST    Collection Time: 04/26/18 11:35 AM   Result Value Ref Range    Breath Alcohol 0.000    Toxicology screen, urine    Collection Time: 04/27/18 10:33 AM   Result Value Ref Range    Alcohol, Urine <10 <10 mg/dL    Benzodiazepines Negative     Methadone metabolites Negative     Cocaine (Metab.) Negative     Opiate Scrn, Ur Negative     Barbiturate Screen, Ur Negative     Amphetamine Screen, Ur Negative     THC Negative     Phencyclidine Negative     Creatinine, Random Ur 26.0 15.0 - 325.0 mg/dL     Toxicology Information SEE COMMENT    POCT BREATH ALCOHOL TEST    Collection Time: 04/27/18 10:36 AM   Result Value Ref Range    Breath Alcohol 0.000    Toxicology screen, urine    Collection Time: 04/30/18 11:25 AM   Result Value Ref Range    Alcohol, Urine <10 <10 mg/dL    Benzodiazepines Negative     Methadone metabolites Negative     Cocaine (Metab.) Negative     Opiate Scrn, Ur Negative     Barbiturate Screen, Ur Negative     Amphetamine Screen, Ur Negative     THC Negative     Phencyclidine Negative     Creatinine, Random Ur 29.0 15.0 - 325.0 mg/dL    Toxicology Information SEE COMMENT    POCT BREATH ALCOHOL TEST    Collection Time: 04/30/18 11:26 AM   Result Value Ref Range    Breath Alcohol 0.000    POCT BREATH ALCOHOL TEST    Collection Time: 05/01/18 10:55 AM   Result Value Ref Range    Breath Alcohol 0.000    Alcohol Biomarkers, urine    Collection Time: 05/02/18 10:46 AM   Result Value Ref Range    Ethyl Glucuronide NEGATIVE    Toxicology screen, urine    Collection Time: 05/02/18 10:46 AM   Result Value Ref Range    Alcohol, Urine <10 <10 mg/dL    Benzodiazepines Negative     Methadone metabolites Negative     Cocaine (Metab.) Negative     Opiate Scrn, Ur Negative     Barbiturate Screen, Ur Negative     Amphetamine Screen, Ur Negative     THC Negative     Phencyclidine Negative     Creatinine, Random Ur 82.0 15.0 - 325.0 mg/dL    Toxicology Information SEE COMMENT    POCT BREATH ALCOHOL TEST    Collection Time: 05/02/18 10:47 AM   Result Value Ref Range    Breath Alcohol 0.000    POCT BREATH ALCOHOL TEST    Collection Time: 05/03/18 11:41 AM   Result Value Ref Range    Breath Alcohol 0.000    Toxicology screen, urine    Collection Time: 05/04/18 10:45 AM   Result Value Ref Range    Alcohol, Urine <10 <10 mg/dL    Benzodiazepines Negative     Methadone metabolites Negative     Cocaine (Metab.) Negative     Opiate Scrn, Ur Negative     Barbiturate Screen, Ur Negative     Amphetamine Screen, Ur Negative      THC Negative     Phencyclidine Negative     Creatinine, Random Ur 35.0 15.0 - 325.0 mg/dL    Toxicology Information SEE COMMENT    POCT BREATH ALCOHOL TEST    Collection Time: 05/04/18 10:47 AM   Result Value Ref Range    Breath Alcohol 0.000    Toxicology screen, urine    Collection Time: 05/07/18 11:06 AM   Result Value Ref Range    Alcohol, Urine <10 <10 mg/dL    Benzodiazepines Negative     Methadone metabolites Negative     Cocaine (Metab.) Negative     Opiate Scrn, Ur Negative     Barbiturate Screen, Ur Negative     Amphetamine Screen, Ur Negative     THC Negative     Phencyclidine Negative     Creatinine, Random Ur 91.0 15.0 - 325.0 mg/dL    Toxicology Information SEE COMMENT    POCT BREATH ALCOHOL TEST    Collection Time: 05/07/18 11:08 AM   Result Value Ref Range    Breath Alcohol 0.000    POCT BREATH ALCOHOL TEST    Collection Time: 05/08/18 11:35 AM   Result Value Ref Range    Breath Alcohol 0.000    Toxicology screen, urine    Collection Time: 05/09/18  1:47 PM   Result Value Ref Range    Alcohol, Urine <10 <10 mg/dL    Benzodiazepines Negative     Methadone metabolites Negative     Cocaine (Metab.) Negative     Opiate Scrn, Ur Negative     Barbiturate Screen, Ur Negative     Amphetamine Screen, Ur Negative     THC Negative     Phencyclidine Negative     Creatinine, Random Ur 53.0 15.0 - 325.0 mg/dL    Toxicology Information SEE COMMENT    Toxicology screen, urine    Collection Time: 05/10/18  9:14 AM   Result Value Ref Range    Alcohol, Urine <10 <10 mg/dL    Benzodiazepines Negative     Methadone metabolites Negative     Cocaine (Metab.) Negative     Opiate Scrn, Ur Negative     Barbiturate Screen, Ur Negative     Amphetamine Screen, Ur Negative     THC Negative     Phencyclidine Negative     Creatinine, Random Ur 53.0 15.0 - 325.0 mg/dL    Toxicology Information SEE COMMENT    POCT BREATH ALCOHOL TEST    Collection Time: 05/10/18  1:24 PM   Result Value Ref Range    Breath Alcohol 0.00     Toxicology screen, urine    Collection Time: 05/11/18 11:04 AM   Result Value Ref Range    Alcohol, Urine <10 <10 mg/dL    Benzodiazepines Negative     Methadone metabolites Negative     Cocaine (Metab.) Negative     Opiate Scrn, Ur Negative     Barbiturate Screen, Ur Negative     Amphetamine Screen, Ur Negative     THC Negative     Phencyclidine Negative     Creatinine, Random Ur 145.0 15.0 - 325.0 mg/dL    Toxicology Information SEE COMMENT    POCT BREATH ALCOHOL TEST    Collection Time: 05/11/18 11:38 AM   Result Value Ref Range    Breath Alcohol 0.00    Toxicology screen, urine    Collection Time: 05/14/18  9:25 AM   Result Value Ref Range    Alcohol, Urine <10 <10 mg/dL    Benzodiazepines Negative     Methadone metabolites Negative     Cocaine (Metab.) Negative     Opiate Scrn, Ur Negative     Barbiturate Screen, Ur Negative     Amphetamine Screen, Ur Negative     THC Negative     Phencyclidine Negative     Creatinine, Random Ur 201.0 15.0 - 325.0 mg/dL    Toxicology Information SEE COMMENT    POCT BREATH ALCOHOL TEST    Collection Time: 05/15/18  1:54 PM   Result Value Ref Range    Breath Alcohol 0.00    Toxicology screen, urine    Collection Time: 05/16/18 10:39 AM   Result Value Ref Range    Alcohol, Urine <10 <10 mg/dL    Benzodiazepines Negative     Methadone metabolites Negative     Cocaine (Metab.) Negative     Opiate Scrn, Ur Negative     Barbiturate Screen, Ur Negative     Amphetamine Screen, Ur Negative     THC Negative     Phencyclidine Negative     Creatinine, Random Ur 55.0 15.0 - 325.0 mg/dL    Toxicology Information SEE COMMENT    POCT BREATH ALCOHOL TEST    Collection Time: 05/16/18 10:40 AM   Result Value Ref Range    Breath Alcohol 0.00        Vitals:  There were no vitals filed for this visit.    Mental Status Exam:  Appearance: age appropriate, casually dressed, thin  Behavior/Cooperation: friendly and cooperative, restless and fidgety , eye contact minimal  Speech: non-spontaneous, rate  "and volume normal when speaking  Mood: "good"  Affect: constricted  Thought Process: normal and logical, goal-directed, logical  Thought Content: not suicidal or homicidal, no hallucinations  Orientation: person, place, situation  Memory: Remote intact and Recent intact  Attention Span/Concentration: Grossly intact  Cognition: grossly intact  Insight: good  Judgment: good    Discharge Instructions:    Diet:  No restrictions    Activity:  activity as tolerated    Medications:  Current Outpatient Prescriptions on File Prior to Encounter   Medication Sig Dispense Refill           gabapentin (NEURONTIN) 300 MG capsule Take 1 capsule (300 mg total) by mouth 3 (three) times daily. 90 capsule 1    naltrexone (DEPADE) 50 mg tablet Take 1 tablet (50 mg total) by mouth once daily. 90 tablet 0    ranitidine (ZANTAC) 150 MG capsule Take 1 capsule (150 mg total) by mouth 2 (two) times daily. 60 capsule 2    rosuvastatin (CRESTOR) 10 MG tablet Take 10 mg by mouth once daily.      venlafaxine 225 mg TR24 Take 225 mg by mouth Daily. 90 each 0     No current facility-administered medications on file prior to encounter.        · Take all medications as prescribed  · Follow up with outpatient mental health provider as discussed with treatment team  · Talk to your provider about any concerns or side effects with your medications  · Avoid all drugs and alcohol      Sabrina Thayer MD          "

## 2018-05-17 NOTE — PLAN OF CARE
05/17/18 1400   Activity/Group Therapy Checklist   Group Relapse Prevention  (Step Work )   Attendance Attended   Follows Direction Followed directions   Group Interactions/Observations Interacted appropriately   Affect/Mood Range Normal range   Affect/Mood Display Appropriate   Goal Progression Progressing

## 2018-05-17 NOTE — LETTER
May 17, 2018    Brooke Schwartz  [address] [place] [address] Dr Luke RUEDA [id number]         [facility]  [address]  [place] [address]  Phone: [phone]  Fax: [phone] May 17, 2018     Patient: Brooke Schwartz   YOB: 1975   Date of Visit: 5/17/2018       To Whom It May Concern:    Brooke Schwartz has successfully completed 30 treatment days within the [facility]. She is psychiatrically stable to return to work at this time. She will receive ongoing care through the [facility] Continuing Care program, which will include weekly group therapy meetings and individual psychiatric visits as appropriate.     If you have any questions or concerns, please don't hesitate to call.    Sincerely,        Clarice eBthea MD

## 2018-05-17 NOTE — PROGRESS NOTES
Group Psychotherapy (PhD/LCSW)    Site: [facility]    Clinical status of patient: Intensive Outpatient Program (IOP)    Date: 5/17/2018    Group Focus: Psychodynamic Group Psychotherapy    Length of service: 56294 - 45-50 minutes    Number of patients in attendance: 6    Referred by: [facility] Treatment Team    Target symptoms: Alcohol Abuse    Patient's response to treatment: Active Listening and Self-disclosure    Progress toward goals: Progressing adequately    Interval History:  Discussed progress in treatment.    Diagnosis: alcohol use disorder, severe, dependence    Plan: Continue treatment on ABU

## 2018-05-17 NOTE — PROGRESS NOTES
Group Psychotherapy (PhD/LCSW)    Site: [facility]    Clinical status of patient: Intensive Outpatient Program (IOP)    Date: 5/16/2018    Group Focus: ACT Group Psychotherapy    Length of service: 43857 - 45-50 minutes    Number of patients in attendance: 12    Referred by: [facility] Behavior [facility] Treatment Team    Target symptoms: Alcohol Abuse    Patient's response to treatment: Active Listening and Self-disclosure    Progress toward goals: Progressing adequately    Interval History: Session focus was Commitment to Values.  Patients were encouraged to generate small and big actions based on their values.     Diagnosis: alcohol use disorder, severe, dependence    Plan: Continue treatment on ABU

## 2018-05-17 NOTE — PROGRESS NOTES
Group Psychotherapy (PhD/LCSW)    Site: [facility]    Clinical status of patient: Intensive Outpatient Program (IOP)    Date: 5/17/2018    Group Focus: Stress Management       Length of service: 17728 - 45-50 minutes    Number of patients in attendance: 10    Referred by: Add[facility] Behavior Unit Treatment Team    Target symptoms: Alcohol Abuse    Patient's response to treatment: Active Listening and Self-disclosure    Progress toward goals: Progressing adequately    Interval History:  Discussed basic model of stress management and its application to strategies for reducing stress. Emphasized the role of changing self-talk from negative to positive in mitigating stress.    .    Diagnosis: alcohol use disorder, severe, dependence    Plan: Continue treatment on ABU

## 2018-05-28 NOTE — PROGRESS NOTES
Group Psychotherapy (MD)     Site: [facility]     Clinical status of patient: Intensive Outpatient Program (IOP)     Date: 5/2/18     Group Focus: Medical and Neuropsychiatric Bases of Psychiatric Disease and Addiction: mood disorders     Length of service: 88747 - 45-50 minutes     Number of patients in attendance: 12     Referred by: Add[facility]ive Behavioral Unit Treatment Team     Target symptoms: depression, chente, mood lability     Patient's response to treatment: Active Listening       Progress toward goals: progressing adequately    Interval history: Provided psychoeducation about the medical model of mood and mood disorders, including basic neuroanatomy of depression and chente. Discussed common symptoms of depression and chente/hypomania, encouraging patients to develop list of their own particular depressive symptoms, in order for them to recognize when depressed or in elevated state. Discussed distinction between bipolar disorder and depression. Explored how mood disorders are extreme variants of normal mood state and need to tolerate expected emotions. Brainstormed precipitating or exacerbating factors in depression and chente, in order to help patients develop their own behavioral treatment. Differentiated bipolar disorder from other causes of mood swings, ideas of self-care versus self-medication, again emphasizing need for longterm positive changes rather than short term fixes.           Diagnosis: Alcohol Use Disorder, severe, dependence     Plan: Continue treatment on ABU

## 2018-06-05 NOTE — PROGRESS NOTES
"Group Psychotherapy (MD)     Site: [facility]     Clinical status of patient: Intensive Outpatient Program (IOP)     Date: 5/16/18     Group Focus: Medical and Neuropsychiatric Bases of Psychiatric Disease and Addiction: anxiety     Length of service: 29474 - 45-50 minutes     Number of patients in attendance: 13     Referred by: Addictive Behavioral Unit Treatment Team     Target symptoms: Anxiety     Patient's response to treatment: Active Listening       Interval history: Discussed psychiatric symptoms of anxiety and neurochemical basis of anxiety, relating it to addiction and other psychiatric disorders. Provided rationale for use of various anxiety treatments, introduced concept of avoidance and discussed ways to gradually expose self to sources of anxiety. Explored responses to anxiety and helped patient separte those responses into either 'self care" or "self medication".      Progress towards goals: progressing adequately       Diagnosis: Alcohol Use disorder, severe, dependence     Plan: Continue treatment on ABU             "

## 2020-01-20 ENCOUNTER — TELEPHONE (OUTPATIENT)
Dept: PSYCHIATRY | Facility: CLINIC | Age: 45
End: 2020-01-20

## 2020-01-20 NOTE — TELEPHONE ENCOUNTER
Pt called inquiring information about ABU program. SW informed pt of program and pt expressed interest. SW will call pt back to review insurance.

## 2020-01-22 ENCOUNTER — DOCUMENTATION ONLY (OUTPATIENT)
Dept: PSYCHIATRY | Facility: HOSPITAL | Age: 45
End: 2020-01-22

## 2020-01-22 NOTE — PSYCH
SW contacted pt to review insurance information and schedule Abu start date. Pt expressed understanding and scheduled ABU appt for 1/23/2020

## 2020-01-23 ENCOUNTER — DOCUMENTATION ONLY (OUTPATIENT)
Dept: PSYCHIATRY | Facility: HOSPITAL | Age: 45
End: 2020-01-23

## 2020-01-23 NOTE — PROGRESS NOTES
Geni placed a call to pt to pt to inquire about coming in for admit apt. No answer sw left a vm for immediate call back.

## 2020-01-23 NOTE — PSYCH
SW contacted pt regarding appt scheduled for 1/23/2020. No answer. SW left message to return call.

## 2020-01-24 ENCOUNTER — LAB VISIT (OUTPATIENT)
Dept: LAB | Facility: HOSPITAL | Age: 45
End: 2020-01-24
Payer: COMMERCIAL

## 2020-01-24 ENCOUNTER — HOSPITAL ENCOUNTER (OUTPATIENT)
Dept: PSYCHIATRY | Facility: HOSPITAL | Age: 45
Discharge: HOME | End: 2020-01-24
Attending: PSYCHIATRY & NEUROLOGY
Payer: COMMERCIAL

## 2020-01-24 VITALS
DIASTOLIC BLOOD PRESSURE: 82 MMHG | BODY MASS INDEX: 18.78 KG/M2 | SYSTOLIC BLOOD PRESSURE: 115 MMHG | HEIGHT: 63 IN | HEART RATE: 78 BPM | WEIGHT: 106 LBS

## 2020-01-24 DIAGNOSIS — F33.2 MAJOR DEPRESSIVE DISORDER, RECURRENT SEVERE WITHOUT PSYCHOTIC FEATURES: ICD-10-CM

## 2020-01-24 DIAGNOSIS — F10.20 ALCOHOL USE DISORDER, SEVERE, DEPENDENCE: ICD-10-CM

## 2020-01-24 DIAGNOSIS — F10.930 ALCOHOL WITHDRAWAL, UNCOMPLICATED: ICD-10-CM

## 2020-01-24 DIAGNOSIS — F33.0 MAJOR DEPRESSIVE DISORDER, RECURRENT, MILD: ICD-10-CM

## 2020-01-24 DIAGNOSIS — F13.90 SEDATIVE, HYPNOTIC OR ANXIOLYTIC USE DISORDER, MODERATE, IN CONTROLLED ENVIRONMENT: ICD-10-CM

## 2020-01-24 DIAGNOSIS — F10.20 ALCOHOL USE DISORDER, SEVERE, DEPENDENCE: Primary | ICD-10-CM

## 2020-01-24 LAB
ALBUMIN SERPL BCP-MCNC: 4.1 G/DL (ref 3.5–5.2)
ALP SERPL-CCNC: 74 U/L (ref 55–135)
ALT SERPL W/O P-5'-P-CCNC: 10 U/L (ref 10–44)
AMPHET+METHAMPHET UR QL: NEGATIVE
ANION GAP SERPL CALC-SCNC: 9 MMOL/L (ref 8–16)
AST SERPL-CCNC: 22 U/L (ref 10–40)
B-HCG UR QL: NEGATIVE
BARBITURATES UR QL SCN>200 NG/ML: NEGATIVE
BENZODIAZ UR QL SCN>200 NG/ML: ABNORMAL
BILIRUB SERPL-MCNC: 0.4 MG/DL (ref 0.1–1)
BREATH ALCOHOL: 0
BUN SERPL-MCNC: 13 MG/DL (ref 6–20)
BZE UR QL SCN: NEGATIVE
CALCIUM SERPL-MCNC: 9.5 MG/DL (ref 8.7–10.5)
CANNABINOIDS UR QL SCN: NEGATIVE
CHLORIDE SERPL-SCNC: 103 MMOL/L (ref 95–110)
CO2 SERPL-SCNC: 28 MMOL/L (ref 23–29)
CREAT SERPL-MCNC: 0.8 MG/DL (ref 0.5–1.4)
CREAT UR-MCNC: 15 MG/DL (ref 15–325)
EST. GFR  (AFRICAN AMERICAN): >60 ML/MIN/1.73 M^2
EST. GFR  (NON AFRICAN AMERICAN): >60 ML/MIN/1.73 M^2
ETHANOL UR-MCNC: 124 MG/DL
GLUCOSE SERPL-MCNC: 86 MG/DL (ref 70–110)
METHADONE UR QL SCN>300 NG/ML: NEGATIVE
OPIATES UR QL SCN: NEGATIVE
PCP UR QL SCN>25 NG/ML: NEGATIVE
POTASSIUM SERPL-SCNC: 4.5 MMOL/L (ref 3.5–5.1)
PROT SERPL-MCNC: 7.8 G/DL (ref 6–8.4)
SODIUM SERPL-SCNC: 140 MMOL/L (ref 136–145)
TOXICOLOGY INFORMATION: ABNORMAL

## 2020-01-24 PROCEDURE — 99223 1ST HOSP IP/OBS HIGH 75: CPT | Mod: ,,, | Performed by: PSYCHIATRY & NEUROLOGY

## 2020-01-24 PROCEDURE — 90853 GROUP PSYCHOTHERAPY: CPT

## 2020-01-24 PROCEDURE — 99223 PR INITIAL HOSPITAL CARE,LEVL III: ICD-10-PCS | Mod: ,,, | Performed by: PSYCHIATRY & NEUROLOGY

## 2020-01-24 PROCEDURE — 81025 URINE PREGNANCY TEST: CPT

## 2020-01-24 PROCEDURE — 80053 COMPREHEN METABOLIC PANEL: CPT

## 2020-01-24 PROCEDURE — 80307 DRUG TEST PRSMV CHEM ANLYZR: CPT

## 2020-01-24 PROCEDURE — 90853 GROUP PSYCHOTHERAPY: CPT | Mod: 59,,, | Performed by: PSYCHOLOGIST

## 2020-01-24 PROCEDURE — 36415 COLL VENOUS BLD VENIPUNCTURE: CPT

## 2020-01-24 PROCEDURE — 90853 PR GROUP PSYCHOTHERAPY: ICD-10-PCS | Mod: 59,,, | Performed by: PSYCHOLOGIST

## 2020-01-24 NOTE — PROGRESS NOTES
Group Psychotherapy (PhD/LCSW)    Site: [facility]    Clinical status of patient: Intensive Outpatient Program (IOP)    Date: 1/24/2020    Group Focus: Psychodynamic Group Psychotherapy    Length of service: 29507 - 45-50 minutes    Number of patients in attendance: 3    Referred by: Addictive Behavior Unit Treatment Team    Target symptoms: Alcohol Abuse    Patient's response to treatment: Active Listening    Progress toward goals: Progressing adequately    Interval History: Pt introduced herself to the group and engaged in a weekend planning discussion to help her maintain her abstinence from alcohol.    Diagnosis: Alcohol Use Disorder    Plan: Continue treatment on ABU
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
## 2020-01-24 NOTE — NURSING
Arrrived to ABU for admission. B/P 100/66 pulse 78  Breathalyzer result .066   Dr Alexander present and aware.   Patient informed not to drive while under the influence of alcohol.   Patient reports she was brought here this morning by her  and he will come to pick her up in the afternoon.

## 2020-01-24 NOTE — PROGRESS NOTES
Group Psychotherapy (PhD/LCSW)    Site: [facility]    Clinical status of patient: Intensive Outpatient Program (IOP)    Date: 1/24/2020    Group Focus: Stress Management    Length of service: 65520 - 45-50 minutes    Number of patients in attendance: 6    Referred by: Addictive Behavior Unit Treatment Team    Target symptoms: Alcohol Abuse    Patient's response to treatment: Active Listening    Progress toward goals: Progressing adequately    Interval History: Group learned and practiced mindfulness exercises (sensory meditation, eating meditation) to improve present-moment awareness, impulsive behavior tendencies, and tolerance of various emotional states.    Diagnosis: Alcohol Use Disorder    Plan: Continue treatment on ABU
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
## 2020-01-27 ENCOUNTER — HOSPITAL ENCOUNTER (OUTPATIENT)
Dept: PSYCHIATRY | Facility: HOSPITAL | Age: 45
Discharge: HOME | End: 2020-01-27
Attending: PSYCHIATRY & NEUROLOGY
Payer: COMMERCIAL

## 2020-01-27 VITALS — SYSTOLIC BLOOD PRESSURE: 102 MMHG | DIASTOLIC BLOOD PRESSURE: 70 MMHG | TEMPERATURE: 98 F | HEART RATE: 83 BPM

## 2020-01-27 DIAGNOSIS — F10.20 ALCOHOL USE DISORDER, SEVERE, DEPENDENCE: Primary | ICD-10-CM

## 2020-01-27 DIAGNOSIS — F13.90 SEDATIVE, HYPNOTIC OR ANXIOLYTIC USE DISORDER, MODERATE, IN CONTROLLED ENVIRONMENT: ICD-10-CM

## 2020-01-27 DIAGNOSIS — F33.0 MAJOR DEPRESSIVE DISORDER, RECURRENT, MILD: Chronic | ICD-10-CM

## 2020-01-27 DIAGNOSIS — F10.930 ALCOHOL WITHDRAWAL, UNCOMPLICATED: ICD-10-CM

## 2020-01-27 LAB
AMPHET+METHAMPHET UR QL: NEGATIVE
BARBITURATES UR QL SCN>200 NG/ML: NEGATIVE
BENZODIAZ UR QL SCN>200 NG/ML: NORMAL
BREATH ALCOHOL: 0
BZE UR QL SCN: NEGATIVE
CANNABINOIDS UR QL SCN: NEGATIVE
CREAT UR-MCNC: 30 MG/DL (ref 15–325)
ETHANOL UR-MCNC: <10 MG/DL
METHADONE UR QL SCN>300 NG/ML: NEGATIVE
OPIATES UR QL SCN: NEGATIVE
PCP UR QL SCN>25 NG/ML: NEGATIVE
TOXICOLOGY INFORMATION: NORMAL

## 2020-01-27 PROCEDURE — 90853 GROUP PSYCHOTHERAPY: CPT

## 2020-01-27 PROCEDURE — 90853 PR GROUP PSYCHOTHERAPY: ICD-10-PCS | Mod: ,,, | Performed by: PSYCHOLOGIST

## 2020-01-27 PROCEDURE — 90853 GROUP PSYCHOTHERAPY: CPT | Mod: ,,, | Performed by: PSYCHOLOGIST

## 2020-01-27 PROCEDURE — 99232 SBSQ HOSP IP/OBS MODERATE 35: CPT | Mod: ,,, | Performed by: PSYCHIATRY & NEUROLOGY

## 2020-01-27 PROCEDURE — 99232 PR SUBSEQUENT HOSPITAL CARE,LEVL II: ICD-10-PCS | Mod: ,,, | Performed by: PSYCHIATRY & NEUROLOGY

## 2020-01-27 PROCEDURE — 90853 GROUP PSYCHOTHERAPY: CPT | Performed by: SOCIAL WORKER

## 2020-01-27 PROCEDURE — 80307 DRUG TEST PRSMV CHEM ANLYZR: CPT

## 2020-01-27 NOTE — PLAN OF CARE
01/27/20 1400   Activity/Group Therapy Checklist   Group Educational  (ACT: Present moment)   Attendance Attended   Follows Direction Followed directions   Group Interactions/Observations Interacted appropriately;Tearful;Sharing;Alert   Affect/Mood Range Normal range   Affect/Mood Display Appropriate  (anxious)   Goal Progression Progressing

## 2020-01-27 NOTE — PROGRESS NOTES
Group Psychotherapy (PhD/LCSW)    Site: [facility]    Clinical status of patient: Intensive Outpatient Program (IOP)    Date: 1/27/2020    Group Focus: Psychodynamic Group Psychotherapy    Length of service: 13663 - 45-50 minutes    Number of patients in attendance: 3    Referred by: Addictive Behavior Unit Treatment Team    Target symptoms: Alcohol Abuse    Patient's response to treatment: Active Listening    Progress toward goals: Progressing adequately    Interval History: Pt described a very difficult weekend, though was able to abstain from drinking. She described symptoms of severe depression and anxiety that are currently complicating her recovery. Group discussed principles of behavioral activation with her, and brainstormed how she could work on this.    Diagnosis: Alcohol Use Disorder    Plan: Continue treatment on ABU
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
## 2020-01-27 NOTE — PROGRESS NOTES
Group Psychotherapy (PhD/LCSW)    Site: [facility]    Clinical status of patient: Intensive Outpatient Program (IOP)    Date: 1/27/2020    Group Focus: DBT-Based Group Psychotherapy    Length of service: 80825 - 45-50 minutes    Number of patients in attendance: 7    Referred by: Addictive Behavior Unit Treatment Team    Target symptoms: Alcohol Abuse    Patient's response to treatment: Active Listening    Progress toward goals: Progressing adequately    Interval History: Session focus was Distress Tolerance:  STOP and Self-Soothe.  Patients were encouraged to use crisis survival skills to reduce intensity of distress.  Each patient identified something soothing for all five senses.    Diagnosis: Alcohol Use Disorder    Plan: Continue treatment on ABU

## 2020-01-27 NOTE — PLAN OF CARE
01/27/20 1000   Activity/Group Therapy Checklist   Group Educational  (codependency    )   Attendance Attended   Follows Direction Followed directions   Group Interactions/Observations Interacted appropriately;Anxious   Affect/Mood Range Normal range   Affect/Mood Display Appropriate

## 2020-01-28 ENCOUNTER — HOSPITAL ENCOUNTER (OUTPATIENT)
Dept: PSYCHIATRY | Facility: HOSPITAL | Age: 45
Discharge: HOME | End: 2020-01-28
Attending: PSYCHIATRY & NEUROLOGY
Payer: COMMERCIAL

## 2020-01-28 VITALS — DIASTOLIC BLOOD PRESSURE: 67 MMHG | SYSTOLIC BLOOD PRESSURE: 106 MMHG | HEART RATE: 68 BPM | TEMPERATURE: 98 F

## 2020-01-28 DIAGNOSIS — F41.1 GAD (GENERALIZED ANXIETY DISORDER): Chronic | ICD-10-CM

## 2020-01-28 DIAGNOSIS — F13.90 SEDATIVE, HYPNOTIC OR ANXIOLYTIC USE DISORDER, MODERATE, IN CONTROLLED ENVIRONMENT: ICD-10-CM

## 2020-01-28 DIAGNOSIS — F33.0 MAJOR DEPRESSIVE DISORDER, RECURRENT, MILD: ICD-10-CM

## 2020-01-28 DIAGNOSIS — F10.20 ALCOHOL USE DISORDER, SEVERE, DEPENDENCE: Primary | ICD-10-CM

## 2020-01-28 LAB — BREATH ALCOHOL: 0

## 2020-01-28 PROCEDURE — 99232 PR SUBSEQUENT HOSPITAL CARE,LEVL II: ICD-10-PCS | Mod: ,,, | Performed by: PSYCHIATRY & NEUROLOGY

## 2020-01-28 PROCEDURE — 90853 PR GROUP PSYCHOTHERAPY: ICD-10-PCS | Mod: ,,, | Performed by: PSYCHOLOGIST

## 2020-01-28 PROCEDURE — 90853 GROUP PSYCHOTHERAPY: CPT

## 2020-01-28 PROCEDURE — 90834 PR PSYCHOTHERAPY W/PATIENT, 45 MIN: ICD-10-PCS | Mod: 59,,, | Performed by: PSYCHOLOGIST

## 2020-01-28 PROCEDURE — 99232 SBSQ HOSP IP/OBS MODERATE 35: CPT | Mod: ,,, | Performed by: PSYCHIATRY & NEUROLOGY

## 2020-01-28 PROCEDURE — 90853 GROUP PSYCHOTHERAPY: CPT | Mod: ,,, | Performed by: PSYCHOLOGIST

## 2020-01-28 PROCEDURE — 90853 GROUP PSYCHOTHERAPY: CPT | Performed by: SOCIAL WORKER

## 2020-01-28 PROCEDURE — 90834 PSYTX W PT 45 MINUTES: CPT | Mod: 59,,, | Performed by: PSYCHOLOGIST

## 2020-01-28 NOTE — PLAN OF CARE
01/28/20 1400   Activity/Group Therapy Checklist   Group Relapse Prevention   Attendance Attended   Follows Direction Followed directions   Group Interactions/Observations Interacted appropriately   Affect/Mood Range Normal range   Affect/Mood Display Appropriate   Goal Progression Progressing

## 2020-01-28 NOTE — PROGRESS NOTES
Group Psychotherapy (PhD/LCSW)    Site: [facility]    Clinical status of patient: Intensive Outpatient Program (IOP)    Date: 1/28/2020    Group Focus: Psychodynamic Group Psychotherapy    Length of service: 30158 - 45-50 minutes    Number of patients in attendance: 4    Referred by: Addictive Behavior Unit Treatment Team    Target symptoms: Alcohol Abuse    Patient's response to treatment: Active Listening    Progress toward goals: Progressing adequately    Interval History: Pt reports improvement in mood yesterday over course of the day. She did eat a meal as she had committed to. She had some positive moments with her . She put together a self-soothe kit and was encouraged to use it when anxious.    Diagnosis: Alcohol Use Disorder    Plan: Continue treatment on ABU

## 2020-01-28 NOTE — PROGRESS NOTES
Group Psychotherapy (PhD/LCSW)    Site: [facility]    Clinical status of patient: Intensive Outpatient Program (IOP)    Date: 1/28/2020    Group Focus: The Dynamics of Relationship       Length of service: 52253 - 45-50 minutes    Number of patients in attendance: 7    Referred by: Addictive Behavior Unit Treatment Team    Target symptoms: Alcohol Abuse    Patient's response to treatment: Active Listening    Progress toward goals: Progressing adequately    Interval History:Discussed application of basic communication skills (I-messages; Active Listening) to improve relationship dynamics. Illustrated their value through role play, modeling, and vignettes    Diagnosis: Alcohol Use Disorder    Plan: Continue treatment on ABU
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
## 2020-01-28 NOTE — PROGRESS NOTES
[facility]  Psychology  Progress Note  Individual Psychotherapy (PhD/LCSW)    Patient Name: Brooke Schwartz  MRN: [id number]    Patient Class: OP- Behavioral Recurring   Admission Date: 1/28/2020  Hospital Length of Stay: 0 days  Attending Physician: Turner Alexander MD  Primary Care Provider: Lefty Solis MD    Therapeutic Intervention: Met with patient.  Intensive Outpatient Program - Insight oriented psychotherapy 45 min - CPT code 81240    Chief Complaint/Reason for Encounter: addictive disorder     Interval History and Content of Current Session: Pt familiar to me from previous tx on ABU. Pt recounted recent hx leading up to her relapse and readmission to ABU. Pt recognizes that she made a mistake after being discharged from the ABU by not following up with the aftercare recommendations including AA, aftercare group, and psychotherapy. She discussed the losses of both parents and being blamed for their deaths by her siblings. She notes that she was cut-off from them and cut out of her parents' donald. Pt attributes job stress as the trigger for her recent relapse. Pt believes that she might have weathered the dysfunctional fx dynamics and job stress without using if she had been actively practicing the AA program. Pt also noted marital difficulties in connection with communication issues with spouse. Pt discussed her intense anxiety about her job status and frustration that she has not heard anything from her place of employment. Pt says that she has only been back in the ABU for 3 days but she senses it is already helping her manage her anxiety. Pt says she likes    Risk Parameters:  Patient reports no suicidal ideation  Patient reports no homicidal ideation  Patient reports no self-injurious behavior  Patient reports no violent behavior    Verbal Deficits: None    Patient's response to intervention:  The patient's response to intervention is accepting.    Progress toward goals and other  mental status changes:  The patient's progress toward goals is fair .    Diagnostic Impression - Plan:     Active Diagnoses:    Diagnosis Date Noted POA    PRINCIPAL PROBLEM:  Alcohol use disorder, severe, dependence [F10.20] 03/19/2018 Yes    ANDI (generalized anxiety disorder) [F41.1] 01/28/2020 Yes     Chronic    Sedative, hypnotic or anxiolytic use disorder, moderate, in controlled environment [F13.90] 01/24/2020 Yes    Major depressive disorder, recurrent, mild [F33.0] 03/19/2018 Yes     Chronic      Problems Resolved During this Admission:       Treatment Plan:  · Target symptoms: recurrent depression, anxiety , substance abuse  · Why chosen therapy is appropriate versus another modality: relevant to diagnosis, patient responds to this modality  · Outcome monitoring methods: self-report, observation, chart notes   · Therapeutic intervention type: insight oriented psychotherapy    Plan:  ABU    Return to Clinic: as scheduled    Length of Service (minutes): 45    Donaldo Anna, PhD  Psychology  [facility]

## 2020-01-29 ENCOUNTER — HOSPITAL ENCOUNTER (OUTPATIENT)
Dept: PSYCHIATRY | Facility: HOSPITAL | Age: 45
Discharge: HOME | End: 2020-01-29
Attending: PSYCHIATRY & NEUROLOGY
Payer: COMMERCIAL

## 2020-01-29 VITALS — SYSTOLIC BLOOD PRESSURE: 111 MMHG | DIASTOLIC BLOOD PRESSURE: 72 MMHG | HEART RATE: 81 BPM

## 2020-01-29 DIAGNOSIS — F13.90 SEDATIVE, HYPNOTIC OR ANXIOLYTIC USE DISORDER, MODERATE, IN CONTROLLED ENVIRONMENT: ICD-10-CM

## 2020-01-29 DIAGNOSIS — F33.0 MAJOR DEPRESSIVE DISORDER, RECURRENT, MILD: ICD-10-CM

## 2020-01-29 DIAGNOSIS — F41.1 GAD (GENERALIZED ANXIETY DISORDER): Chronic | ICD-10-CM

## 2020-01-29 DIAGNOSIS — F10.20 ALCOHOL USE DISORDER, SEVERE, DEPENDENCE: Primary | ICD-10-CM

## 2020-01-29 LAB
AMPHET+METHAMPHET UR QL: NEGATIVE
BARBITURATES UR QL SCN>200 NG/ML: NEGATIVE
BENZODIAZ UR QL SCN>200 NG/ML: NORMAL
BREATH ALCOHOL: 0
BZE UR QL SCN: NEGATIVE
CANNABINOIDS UR QL SCN: NEGATIVE
CREAT UR-MCNC: 15 MG/DL (ref 15–325)
ETHANOL UR-MCNC: <10 MG/DL
ETHYL GLUCURONIDE: NEGATIVE NG/ML
METHADONE UR QL SCN>300 NG/ML: NEGATIVE
OPIATES UR QL SCN: NEGATIVE
PCP UR QL SCN>25 NG/ML: NEGATIVE
TOXICOLOGY INFORMATION: NORMAL

## 2020-01-29 PROCEDURE — 99232 PR SUBSEQUENT HOSPITAL CARE,LEVL II: ICD-10-PCS | Mod: ,,, | Performed by: PSYCHIATRY & NEUROLOGY

## 2020-01-29 PROCEDURE — 90853 GROUP PSYCHOTHERAPY: CPT | Mod: ,,, | Performed by: PSYCHOLOGIST

## 2020-01-29 PROCEDURE — 90853 GROUP PSYCHOTHERAPY: CPT

## 2020-01-29 PROCEDURE — 99232 SBSQ HOSP IP/OBS MODERATE 35: CPT | Mod: ,,, | Performed by: PSYCHIATRY & NEUROLOGY

## 2020-01-29 PROCEDURE — 90853 GROUP PSYCHOTHERAPY: CPT | Performed by: SOCIAL WORKER

## 2020-01-29 PROCEDURE — 80307 DRUG TEST PRSMV CHEM ANLYZR: CPT

## 2020-01-29 PROCEDURE — 80346 BENZODIAZEPINES1-12: CPT

## 2020-01-29 PROCEDURE — 90853 PR GROUP PSYCHOTHERAPY: ICD-10-PCS | Mod: ,,, | Performed by: PSYCHOLOGIST

## 2020-01-29 RX ORDER — TRAZODONE HYDROCHLORIDE 50 MG/1
50 TABLET ORAL NIGHTLY PRN
Qty: 5 TABLET | Refills: 0 | Status: SHIPPED | OUTPATIENT
Start: 2020-01-29 | End: 2020-02-10 | Stop reason: HOSPADM
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
## 2020-01-29 NOTE — PLAN OF CARE
01/29/20 1400   Activity/Group Therapy Checklist   Group Goals/Reflection  (perception of self)   Attendance Attended   Follows Direction Followed directions   Group Interactions/Observations Interacted appropriately   Affect/Mood Range Normal range   Affect/Mood Display Appropriate   Goal Progression Progressing

## 2020-01-29 NOTE — PROGRESS NOTES
Group Psychotherapy (PhD/LCSW)    Site: [facility]    Clinical status of patient: Intensive Outpatient Program (IOP)    Date: 1/29/2020    Group Focus: DBT-Based Group Psychotherapy    Length of service: 74124 - 45-50 minutes    Number of patients in attendance: 8    Referred by: Addictive Behavior Unit Treatment Team    Target symptoms: Alcohol Abuse    Patient's response to treatment: Active Listening    Progress toward goals: Progressing adequately    Interval History: Session focus was Distress Tolerance:  Distract with ACCEPTS.  Patients were encouraged to use activities, contributing, comparisons, different emotions, pushing away, other thoughts, and sensations to reduce intensity of distress.  Each patient identified unique ways to use ACCEPTS.    Diagnosis: Alcohol Use Disorder    Plan: Continue treatment on ABU

## 2020-01-29 NOTE — TREATMENT PLAN
[facility]  ADDICTIVE BEHAVIOR UNIT  INTERDISCIPLINARY TREATMENT PLAN  INTENSIVE OUTPATIENT PROGRAM    INTERDISCIPLINARY  TREATMENT TEAM:    Turner Alexander M.D., Psychiatrist     Gale Willams, Ph.D., Clinical Psychologist    Vinay Reyez, \[facility] [place] [facility]\""W,     Margie John, \[facility]\""W,     José Miguel Brennan, GISELLAW,     Resident: Dr. Silva        Signatures scanned into record separately.      ESTIMATED LOS:  4-6 weeks        The patient has reviewed the treatment plan with staff and has signed the Patient Responsibilities form.  Patient signature scanned into record separately        Dr. Pieter Bro certifies that the patient would require inpatient psychiatric care if the Partial Hospitalization services were not provided, and services will be furnished under the care of a physician, and under a written Plan of Treatment.    Pieter Bro M.D., Psychiatrist - Signature scanned into record separately.    TREATMENT PLAN    DIAGNOSIS: Alcohol Use Disorder, severe; Benzodiazepine Use, r/o Disorder; SIMD; r/o Adjustment Disorder w/mixed features; r/o other unspecified anxiety disorder       Patient/Family Education Needs/Barriers to Learning (i.e., Language, Reading, Comprehension): None       Support/Advocacy Services/Needs (i.e., Financial, Transportation, Medications): None       Community Resources (i.e., Alcoholics Anonymous, Al Anon, Cocaine Anonymous, Narcotics Anonymous): patient provided with a list of AA meetings in the [place] area           Strengths:  1. Helpful to others   2. Hard working   3. Committed to loved ones   4. Willing for treatment         Limitations:  1. Coping with grief/loss   2. Depression/anxiety   3. Marital discord   4. Work stress            Goals and Objectives:  1. Goal:  Abstain from alcohol and illicit drugs   Objective measure: Negative breathalyzer, negative urine screens   Time frame to reach goal: By discharge    2  Goal: Attend daily  12-step meetings   Objective measure: Signed attendance sheet daily   Time frame to reach goal: Each day    3. Goal: Participate in group sessions    Objective measure: Progress notes indicating active listening, self-disclosure,   feedback   Time frame to reach goal: Each day    4. Goal: Obtain a 12-step sponsor   Objective measure: self-report   Time frame to reach goal: By discharge    5. Goal: Complete Life Story   Objective measure: Share story with group   Time frame to reach goal: Within first two weeks of treatment    6. Goal: Complete First Step   Objective measure: Share 1st step with group   Time frame to reach goal:  By discharge    7. Goal: Complete Relapse Prevention Plan   Objective measure: Share plan with group   Time frame to reach goal: By discharge    8. Goal: Complete detoxification   Objective measure: Physician progress note indicating detoxification is complete   Time frame to reach goal: Two weeks    9.  Goal: Family involvement/participation   Objective measure: Family session documented in progress notes   Time frame to reach goal: By discharge    10. Goal:  Reduce depression   Objective measure: Physician progress note indicating depression is improved   Time frame to reach goal: By discharge    11.  Goal: Reduce anxiety   Objective measure: Physician progress note indicating anxiety is improved   Time frame to reach goal: By discharge                   Group Interventions:  Psychodynamic Group Psychotherapy  1 hour, five times per week  Goals: 1. Utilize group empathy and support for problem solving; 2. Apply stress management, communication, and assertive skills to personal issues; 3. Discuss negative consequences of addictive behavior; 4. Discuss ways to change lifestyle to support sobriety; 5. Discuss addiction history    Addiction Education Group  1 hour, 2 times per week  Goals:  1. Verbalize increased knowledge of the process of recovery; 2. Understand basic concepts of addiction  (denial, powerlessness, unmanageabiltiy, etc.); 3. Develop a consistent, positive image of self    Steps to Recovery Group  1 hour, 1 time per week  Goals:  1. Learn 12 steps; 2. Identify ways to incorporate 12 step principles into daily life; 3. Complete first step; 4. Verbalize knowledge and understanding of the concept of a higher power    Living Sober Group  1 hour, 2 times per week  Goals:  1.  Reflect upon events of day/weekend, focusing on positive change; 2.  Discuss dynamics of 12 step meetings attended; 3. Discuss topics from book Living Sober     Stress Management Skills Group  1 hour, 3 times per week  Goals: 1. Identify types and levels of stress; 2. Identify and change maladaptive beliefs and behaviors; 3. Identify and practice techniques of stress management    Disease Concept Group  1 hour, 1 time per week  Goals: 1. Verbalize an understanding of the disease concept of addiction; 2. Increase familys understanding of the disease concept of addiction    Communication Skills Group  1 hour, 2 times per week  Goals: 1. Learn rules of effective communication; 2. Improve listening skills; 3. Practice clear communication    Promoting Healthy Lifestyles Group  1 hour, 1 time per week  Goals:  1. Understand the biopsychosocial model of health; 2. Develop insight into how substance abuse/dependency can impact dimensions of health; 3. Develop appropriate health promotion strategies    Relationship Dynamics Group  1 hour, 1 time per week  Goals:  1. Learn about factors that shape relationships; 2. Understand the central role of relationships in personal well-being; 3. Learn how to improve all relationships    Medical Complications Group  1 hour, 1 time per week  Goals:  1.  Increase knowledge of how addiction negatively affects the body; 2. Increase awareness of how abstinence can positively impact health

## 2020-01-29 NOTE — PATIENT CARE CONFERENCE
ABU Staffing:    Alcohol Use Disorder, Severe  Benzodiazepine Use, R/o Disorder  Hx MDD  SIMD  R/o Adjustment Disorder w/mixed features  R/o Other unspecified anxiety disorder     1. Pt is attending all groups    2. Pt is attending all meetings  3. Pt 's has minimally supportive family  4. Pt has completed spiritual assessment    5. Pt will present life story    6. Pt will present Step One assignment    7. Pt is exploring issues related to relapse  prevention; spirituality; stress management; improved communication skills; assertiveness training; poor self-esteem; disease concepts; cross addictions; and, work related issues    8. D/C date: TBD         Staff discussed pt's enrollment in ABU program due to a relapse after staying sober for 2 years. Staffing discussed pt completing valium taper. Staffing discussed psychosocial stressors related to work, , and finances. Staffing discussed pt appearing disengaged with recovery, in denial and prioritizing  and work stressors. Staffing discussed pt's  attending family day appearing under the influence and reporting pt's relapse dating back to September of 2019. Staffing also discussed pt's resistance to self report to the medical board. Staffing also discussed pt appearing impaired and regressed from prior admission to ABU. Staffing discussed concerns for pt's sleep issues, anxiety, and depression. Staffing also discussed pt's reported triggers.          Problem:  Alcohol Use Disorder, severe   Goal: Address in 12 step meetings and group and individual sessions    Objective Measure: participation in groups, self report, length of sobriety, and relapse prevention plan  Time: Prior to discharge    Progress: Pt is attending groups and sessions     Problem:Benzodiazepine Use, R/o Disorder  Goal: Address in 12 step meetings and group and individual sessions    Objective Measure: participation in groups, self report, length of sobriety, and relapse  prevention plan  Time: Prior to discharge    Progress: Pt is attending groups and sessions        Problem:  Hx of MDD  Goal: Address in 12 step meetings and group and individual sessions    Objective Measure: participation in groups, self report, length of sobriety, and relapse prevention plan  Time: Prior to discharge    Progress: Pt is attending groups and sessions    Problem:  SIMD  Goal: Address in 12 step meetings and group and individual sessions    Objective Measure: participation in groups, self report, length of sobriety, and relapse prevention plan  Time: Prior to discharge    Progress: Pt is attending groups and sessions    Problem:  SIMD  Goal: Address in 12 step meetings and group and individual sessions    Objective Measure: participation in groups, self report, length of sobriety, and relapse prevention plan  Time: Prior to discharge    Progress: Pt is attending groups and sessions    Problem: R/o Adjustment Disorder w/mixed features  Goal: Address in 12 step meetings and group and individual sessions    Objective Measure: participation in groups, self report, length of sobriety, and relapse prevention plan  Time: Prior to discharge    Progress: Pt is attending groups and sessions      Problem:  R/o Other unspecified anxiety disorder  Goal: Address in 12 step meetings and group and individual sessions    Objective Measure: participation in groups, self report, length of sobriety, and relapse prevention plan  Time: Prior to discharge    Progress: Pt is attending groups and sessions        Staff members present:    MD Dr. Roel Miranda MD Resident  Dr. Ricardo MD Resident  Dr. Willams, PhD  Vinay Reyez, [facility]  Margie John, [facility]  José Miguel Brennan, [facility]

## 2020-01-29 NOTE — PROGRESS NOTES
Group Psychotherapy (PhD/LCSW)    Site: [facility]    Clinical status of patient: Intensive Outpatient Program (IOP)    Date: 1/29/2020    Group Focus: The Dynamics of Relationship       Length of service: 45127 - 45-50 minutes    Number of patients in attendance: 9    Referred by: Addictive Behavior Unit Treatment Team    Target symptoms: Alcohol Abuse    Patient's response to treatment: Active Listening    Progress toward goals: Progressing adequately    Interval History: Discussed strategies for ameliorating dysfunctional patterns in interpersonal dynamics by  delineating one's part in the pattern and changing it rather than trying to change the other person's behavior. Illustrated how to do this through modeling, role play, and vignettes.     Diagnosis: Alcohol Use Disorder    Plan: Continue treatment on ABU

## 2020-01-29 NOTE — PROGRESS NOTES
Group Psychotherapy (PhD/LCSW)    Site: [facility]    Clinical status of patient: Intensive Outpatient Program (IOP)    Date: 1/29/2020    Group Focus: Psychodynamic Group Psychotherapy    Length of service: 99512 - 45-50 minutes    Number of patients in attendance: 4    Referred by: Addictive Behavior Unit Treatment Team    Target symptoms: Alcohol Abuse    Patient's response to treatment: Active Listening    Progress toward goals: Progressing adequately    Interval History: Pt reports she did not sleep at all last night and continues to ruminate about job. Group worked with her on helping to shift her focus and work on better sleep hygiene.    Diagnosis: Alcohol Use Disorder    Plan: Continue treatment on ABU

## 2020-01-30 ENCOUNTER — HOSPITAL ENCOUNTER (OUTPATIENT)
Dept: PSYCHIATRY | Facility: HOSPITAL | Age: 45
Discharge: HOME | End: 2020-01-30
Attending: PSYCHIATRY & NEUROLOGY
Payer: COMMERCIAL

## 2020-01-30 DIAGNOSIS — F10.20 ALCOHOL USE DISORDER, SEVERE, DEPENDENCE: Primary | ICD-10-CM

## 2020-01-30 DIAGNOSIS — F33.0 MAJOR DEPRESSIVE DISORDER, RECURRENT, MILD: ICD-10-CM

## 2020-01-30 LAB — BREATH ALCOHOL: 0

## 2020-01-30 PROCEDURE — 90853 GROUP PSYCHOTHERAPY: CPT | Performed by: SOCIAL WORKER

## 2020-01-30 PROCEDURE — 90853 GROUP PSYCHOTHERAPY: CPT | Mod: ,,, | Performed by: PSYCHOLOGIST

## 2020-01-30 PROCEDURE — 90853 GROUP PSYCHOTHERAPY: CPT

## 2020-01-30 PROCEDURE — 90853 PR GROUP PSYCHOTHERAPY: ICD-10-PCS | Mod: ,,, | Performed by: PSYCHOLOGIST

## 2020-01-30 NOTE — PLAN OF CARE
01/30/20 1400   Activity/Group Therapy Checklist   Group Relapse Prevention  (Defense Mechanisms using Wise Mind)   Attendance Attended   Follows Direction Followed directions   Group Interactions/Observations Interacted appropriately   Affect/Mood Range Normal range   Affect/Mood Display Appropriate   Goal Progression Progressing
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
## 2020-01-30 NOTE — PROGRESS NOTES
Group Psychotherapy (PhD/LCSW)    Site: [facility]    Clinical status of patient: Intensive Outpatient Program (IOP)    Date: 1/30/2020    Group Focus: Psychodynamic Group Psychotherapy    Length of service: 11714 - 45-50 minutes    Number of patients in attendance: 5    Referred by: Addictive Behavior Unit Treatment Team    Target symptoms: Alcohol Abuse    Patient's response to treatment: Active Listening    Progress toward goals: Progressing adequately    Interval History: Pt reports she slept better last night with use of Trazodone and therefore feels much better. Her affect was brighter and she was more engaged in group than previous days.    Diagnosis: Alcohol Use Disorder    Plan: Continue treatment on ABU

## 2020-01-30 NOTE — PLAN OF CARE
01/30/20 1000   Activity/Group Therapy Checklist   Group Educational  (healthy anger  )   Attendance Attended   Follows Direction Followed directions   Group Interactions/Observations Interacted appropriately   Affect/Mood Range Normal range   Affect/Mood Display Appropriate   Goal Progression Progressing

## 2020-01-31 ENCOUNTER — HOSPITAL ENCOUNTER (OUTPATIENT)
Dept: PSYCHIATRY | Facility: HOSPITAL | Age: 45
Discharge: HOME | End: 2020-01-31
Attending: PSYCHIATRY & NEUROLOGY
Payer: COMMERCIAL

## 2020-01-31 VITALS — SYSTOLIC BLOOD PRESSURE: 104 MMHG | TEMPERATURE: 98 F | DIASTOLIC BLOOD PRESSURE: 75 MMHG | HEART RATE: 72 BPM

## 2020-01-31 DIAGNOSIS — F33.0 MAJOR DEPRESSIVE DISORDER, RECURRENT, MILD: ICD-10-CM

## 2020-01-31 DIAGNOSIS — F41.1 GAD (GENERALIZED ANXIETY DISORDER): Chronic | ICD-10-CM

## 2020-01-31 DIAGNOSIS — F13.90 SEDATIVE, HYPNOTIC OR ANXIOLYTIC USE DISORDER, MODERATE, IN CONTROLLED ENVIRONMENT: ICD-10-CM

## 2020-01-31 DIAGNOSIS — F10.20 ALCOHOL USE DISORDER, SEVERE, DEPENDENCE: Primary | ICD-10-CM

## 2020-01-31 LAB
AMPHET+METHAMPHET UR QL: NEGATIVE
BARBITURATES UR QL SCN>200 NG/ML: NEGATIVE
BENZODIAZ UR QL SCN>200 NG/ML: NORMAL
BREATH ALCOHOL: 0
BZE UR QL SCN: NEGATIVE
CANNABINOIDS UR QL SCN: NEGATIVE
CREAT UR-MCNC: 37 MG/DL (ref 15–325)
ETHANOL UR-MCNC: <10 MG/DL
METHADONE UR QL SCN>300 NG/ML: NEGATIVE
OPIATES UR QL SCN: NEGATIVE
PCP UR QL SCN>25 NG/ML: NEGATIVE
TOXICOLOGY INFORMATION: NORMAL

## 2020-01-31 PROCEDURE — 99232 PR SUBSEQUENT HOSPITAL CARE,LEVL II: ICD-10-PCS | Mod: ,,, | Performed by: PSYCHIATRY & NEUROLOGY

## 2020-01-31 PROCEDURE — 99232 SBSQ HOSP IP/OBS MODERATE 35: CPT | Mod: ,,, | Performed by: PSYCHIATRY & NEUROLOGY

## 2020-01-31 PROCEDURE — 90853 GROUP PSYCHOTHERAPY: CPT | Performed by: SOCIAL WORKER

## 2020-01-31 PROCEDURE — 80307 DRUG TEST PRSMV CHEM ANLYZR: CPT

## 2020-01-31 PROCEDURE — 90853 PR GROUP PSYCHOTHERAPY: ICD-10-PCS | Mod: 59,,, | Performed by: PSYCHOLOGIST

## 2020-01-31 PROCEDURE — 90853 GROUP PSYCHOTHERAPY: CPT | Mod: 59,,, | Performed by: PSYCHOLOGIST

## 2020-01-31 PROCEDURE — 90853 GROUP PSYCHOTHERAPY: CPT

## 2020-01-31 NOTE — PROGRESS NOTES
Group Psychotherapy (PhD/LCSW)    Site: [facility]    Clinical status of patient: Intensive Outpatient Program (IOP)    Date: 1/31/2020    Group Focus: Stress Management    Length of service: 82477 - 45-50 minutes    Number of patients in attendance: 9    Referred by: Addictive Behavior Unit Treatment Team    Target symptoms: Alcohol Abuse    Patient's response to treatment: Active Listening    Progress toward goals: Progressing adequately    Interval History: Group learned and practiced mindfulness exercises (sensory meditation) to improve present-moment awareness, impulsive behavior tendencies, and tolerance of various emotional states.    Diagnosis: Alcohol Use Disorder    Plan: Continue treatment on ABU

## 2020-01-31 NOTE — PLAN OF CARE
01/31/20 1400   Activity/Group Therapy Checklist   Group Relapse Prevention  (Defense Mechanisms Continued)   Attendance Attended   Follows Direction Followed directions   Group Interactions/Observations Interacted appropriately   Affect/Mood Range Normal range   Affect/Mood Display Appropriate   Goal Progression Progressing

## 2020-01-31 NOTE — PLAN OF CARE
01/31/20 1000   Activity/Group Therapy Checklist   Group Goals/Reflection  (miracle question  )   Attendance Attended   Follows Direction Followed directions   Group Interactions/Observations Interacted appropriately   Affect/Mood Range Normal range   Affect/Mood Display Appropriate   Goal Progression Progressing
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
## 2020-01-31 NOTE — PROGRESS NOTES
Group Psychotherapy (PhD/LCSW)    Site: [facility]    Clinical status of patient: Intensive Outpatient Program (IOP)    Date: 1/30/2020    Group Focus: Promoting Healthy Lifestyles    Length of service: 37643 - 45-50 minutes    Number of patients in attendance: 11    Referred by: Addictive Behavior Unit Treatment Team    Target symptoms: Alcohol Abuse    Patient's response to treatment: Active Listening    Progress toward goals: Progressing adequately    Interval History: Session focus was on sleep hygiene and CBT sleep strategies.    Diagnosis: Alcohol Use Disorder    Plan: Continue treatment on ABU

## 2020-01-31 NOTE — PROGRESS NOTES
Group Psychotherapy (PhD/LCSW)    Site: [facility]    Clinical status of patient: Intensive Outpatient Program (IOP)    Date: 1/31/2020    Group Focus: Psychodynamic Group Psychotherapy    Length of service: 21430 - 45-50 minutes    Number of patients in attendance: 5    Referred by: Addictive Behavior Unit Treatment Team    Target symptoms: Alcohol Abuse    Patient's response to treatment: Active Listening    Progress toward goals: Progressing adequately    Interval History: Pt described high anxiety when answering emails for work in the mornings. Explored different strategies she could use to lower anxiety and bring more mindfulness to these email responses.    Diagnosis: Alcohol Use Disorder    Plan: Continue treatment on ABU

## 2020-02-02 LAB
7AMINOCLONAZEPAM UR CFM-MCNC: NEGATIVE NG/ML
7AMINOFLUNITRAZEPAM UR CFM-MCNC: NEGATIVE NG/ML
A-OH ALPRAZ UR CFM-MCNC: NEGATIVE NG/ML
A-OH-TRIAZOLAM UR CFM-MCNC: NEGATIVE NG/ML
BENZODIAZEPINE CONF CHAIN OF CUSTODY: NORMAL
LORAZEPAM UR CFM-MCNC: NEGATIVE NG/ML
NORDIAZEPAM UR CFM-MCNC: NEGATIVE NG/ML
OH-ETHYLFLURAZ UR CFM-MCNC: NEGATIVE NG/ML
OXAZEPAM UR CFM-MCNC: NEGATIVE NG/ML
TEMAZEPAM UR CFM-MCNC: 357 NG/ML
TOXICOLOGIST REVIEW: NORMAL

## 2020-02-03 ENCOUNTER — HOSPITAL ENCOUNTER (OUTPATIENT)
Dept: PSYCHIATRY | Facility: HOSPITAL | Age: 45
Discharge: HOME | End: 2020-02-03
Attending: PSYCHIATRY & NEUROLOGY
Payer: COMMERCIAL

## 2020-02-03 VITALS — SYSTOLIC BLOOD PRESSURE: 85 MMHG | HEART RATE: 81 BPM | DIASTOLIC BLOOD PRESSURE: 47 MMHG

## 2020-02-03 DIAGNOSIS — F33.0 MAJOR DEPRESSIVE DISORDER, RECURRENT, MILD: ICD-10-CM

## 2020-02-03 DIAGNOSIS — F41.1 GAD (GENERALIZED ANXIETY DISORDER): ICD-10-CM

## 2020-02-03 DIAGNOSIS — F13.90 SEDATIVE, HYPNOTIC OR ANXIOLYTIC USE DISORDER, MODERATE, IN CONTROLLED ENVIRONMENT: ICD-10-CM

## 2020-02-03 DIAGNOSIS — F10.20 ALCOHOL USE DISORDER, SEVERE, DEPENDENCE: Primary | ICD-10-CM

## 2020-02-03 LAB
AMPHET+METHAMPHET UR QL: NEGATIVE
BARBITURATES UR QL SCN>200 NG/ML: NEGATIVE
BENZODIAZ UR QL SCN>200 NG/ML: NORMAL
BREATH ALCOHOL: 0
BZE UR QL SCN: NEGATIVE
CANNABINOIDS UR QL SCN: NEGATIVE
CREAT UR-MCNC: 80 MG/DL (ref 15–325)
ETHANOL UR-MCNC: <10 MG/DL
METHADONE UR QL SCN>300 NG/ML: NEGATIVE
OPIATES UR QL SCN: NEGATIVE
PCP UR QL SCN>25 NG/ML: NEGATIVE
TOXICOLOGY INFORMATION: NORMAL

## 2020-02-03 PROCEDURE — 90853 GROUP PSYCHOTHERAPY: CPT | Mod: ,,, | Performed by: PSYCHOLOGIST

## 2020-02-03 PROCEDURE — 90853 GROUP PSYCHOTHERAPY: CPT

## 2020-02-03 PROCEDURE — 90853 PR GROUP PSYCHOTHERAPY: ICD-10-PCS | Mod: ,,, | Performed by: PSYCHOLOGIST

## 2020-02-03 PROCEDURE — 80307 DRUG TEST PRSMV CHEM ANLYZR: CPT

## 2020-02-03 PROCEDURE — 99232 PR SUBSEQUENT HOSPITAL CARE,LEVL II: ICD-10-PCS | Mod: ,,, | Performed by: PSYCHIATRY & NEUROLOGY

## 2020-02-03 PROCEDURE — 90853 GROUP PSYCHOTHERAPY: CPT | Performed by: SOCIAL WORKER

## 2020-02-03 PROCEDURE — 99232 SBSQ HOSP IP/OBS MODERATE 35: CPT | Mod: ,,, | Performed by: PSYCHIATRY & NEUROLOGY

## 2020-02-03 NOTE — PLAN OF CARE
02/03/20 1400   Activity/Group Therapy Checklist   Group Educational  (Gratitude)   Attendance Attended   Follows Direction Followed directions   Group Interactions/Observations Interacted appropriately;Alert;Sharing  (Shared study on gratitude and how the use of inclusive language vs isolative language impacts ones gratitude)   Affect/Mood Range Normal range   Affect/Mood Display Appropriate   Goal Progression Progressing

## 2020-02-03 NOTE — PROGRESS NOTES
Group Psychotherapy (PhD/LCSW)    Site: [facility]    Clinical status of patient: Intensive Outpatient Program (IOP)    Date: 2/3/2020    Group Focus: DBT-Based Group Psychotherapy    Length of service: 06641 - 45-50 minutes    Number of patients in attendance: 8    Referred by: Addictive Behavior Unit Treatment Team    Target symptoms: Alcohol Abuse    Patient's response to treatment: Active Listening; Self-Disclosure    Progress toward goals: Progressing adequately    Interval History: Session focus was Mindfulness.  Patients were introduced to the goals of mindfulness practice and provided with several what skills to formally and informally practice mindfulness.    Diagnosis: Alcohol Use Disorder    Plan: Continue treatment on ABU

## 2020-02-03 NOTE — PLAN OF CARE
02/03/20 1000   Activity/Group Therapy Checklist   Group Addiction Education   Attendance Attended   Follows Direction Followed directions   Group Interactions/Observations Interacted appropriately   Affect/Mood Range Normal range   Affect/Mood Display Appropriate   Goal Progression Progressing

## 2020-02-03 NOTE — PROGRESS NOTES
"Group Psychotherapy (PhD/LCSW)    Site: [facility]    Clinical status of patient: Intensive Outpatient Program (IOP)    Date: 2/3/2020    Group Focus: Psychodynamic Group Psychotherapy    Length of service: 41323 - 45-50 minutes    Number of patients in attendance: 5    Referred by: Addictive Behavior Unit Treatment Team    Target symptoms: Alcohol Abuse    Patient's response to treatment: Active Listening    Progress toward goals: Progressing adequately    Interval History: Pt reports feeling very irritable for most of the weekend and felt unable to leave the house due to "anhedonia". Group worked with her to brainstorm on ways to help cobble together enough motivation to take some healthy steps.    Diagnosis: Alcohol Use Disorder    Plan: Continue treatment on ABU        "
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
## 2020-02-04 ENCOUNTER — HOSPITAL ENCOUNTER (OUTPATIENT)
Dept: PSYCHIATRY | Facility: HOSPITAL | Age: 45
Discharge: HOME | End: 2020-02-04
Attending: PSYCHIATRY & NEUROLOGY
Payer: COMMERCIAL

## 2020-02-04 DIAGNOSIS — F10.20 ALCOHOL USE DISORDER, SEVERE, DEPENDENCE: Primary | ICD-10-CM

## 2020-02-04 DIAGNOSIS — F33.0 MAJOR DEPRESSIVE DISORDER, RECURRENT, MILD: ICD-10-CM

## 2020-02-04 LAB — BREATH ALCOHOL: 0

## 2020-02-04 PROCEDURE — 90853 PR GROUP PSYCHOTHERAPY: ICD-10-PCS | Mod: ,,, | Performed by: PSYCHOLOGIST

## 2020-02-04 PROCEDURE — 90853 GROUP PSYCHOTHERAPY: CPT

## 2020-02-04 PROCEDURE — 90853 GROUP PSYCHOTHERAPY: CPT | Mod: ,,, | Performed by: PSYCHOLOGIST

## 2020-02-04 PROCEDURE — 90853 GROUP PSYCHOTHERAPY: CPT | Performed by: SOCIAL WORKER

## 2020-02-04 NOTE — PLAN OF CARE
02/04/20 1400   Activity/Group Therapy Checklist   Group Addiction Education   Attendance Attended   Follows Direction Followed directions   Group Interactions/Observations Interacted appropriately   Affect/Mood Range Normal range   Affect/Mood Display Appropriate   Goal Progression Progressing

## 2020-02-04 NOTE — PROGRESS NOTES
Group Psychotherapy (PhD/LCSW)    Site: [facility]    Clinical status of patient: Intensive Outpatient Program (IOP)    Date: 2/4/2020    Group Focus: Disease Model of Addiction      Length of service: 73108 - 45-50 minutes    Number of patients in attendance: 3    Referred by: Addictive Behavior Unit Treatment Team    Target symptoms: Alcohol Abuse    Patient's response to treatment: Active Listening    Progress toward goals: Progressing adequately    Interval History: Discussed the basic neuropsychological concepts of the Disease Model of Addiction and the way they relate to the phenomena of addition (eg, powerlessness, cravings, euphoric recall, tolerance, etc). Noted the way the Disease Model comports with 12-step concepts and practices. Noted also the value of understanding the Disease Model for sustaining long-term sobriety.      Diagnosis: Alcohol Use Disorder    Plan: Continue treatment on ABU

## 2020-02-04 NOTE — PROGRESS NOTES
Group Psychotherapy (PhD/LCSW)    Site: [facility]    Clinical status of patient: Intensive Outpatient Program (IOP)    Date: 2/4/2020    Group Focus: DBT-Based Group Psychotherapy    Length of service: 44520 - 45-50 minutes    Number of patients in attendance: 9    Referred by: Addictive Behavior Unit Treatment Team    Target symptoms: Alcohol Abuse    Patient's response to treatment: Active Listening; Self-Disclosure    Progress toward goals: Progressing adequately    Interval History: Session focus was Interpersonal Effectiveness:  Validation.  Patients were encouraged to focus on validation of others by enhancing their ability to hear, understand, and respect others.    Diagnosis: Alcohol Use Disorder    Plan: Continue treatment on ABU

## 2020-02-05 ENCOUNTER — HOSPITAL ENCOUNTER (OUTPATIENT)
Dept: PSYCHIATRY | Facility: HOSPITAL | Age: 45
Discharge: HOME | End: 2020-02-05
Attending: PSYCHIATRY & NEUROLOGY
Payer: COMMERCIAL

## 2020-02-05 VITALS — HEART RATE: 65 BPM | DIASTOLIC BLOOD PRESSURE: 58 MMHG | SYSTOLIC BLOOD PRESSURE: 88 MMHG

## 2020-02-05 DIAGNOSIS — F13.90 SEDATIVE, HYPNOTIC OR ANXIOLYTIC USE DISORDER, MODERATE, IN CONTROLLED ENVIRONMENT: ICD-10-CM

## 2020-02-05 DIAGNOSIS — F41.1 GAD (GENERALIZED ANXIETY DISORDER): Chronic | ICD-10-CM

## 2020-02-05 DIAGNOSIS — F33.0 MAJOR DEPRESSIVE DISORDER, RECURRENT, MILD: ICD-10-CM

## 2020-02-05 DIAGNOSIS — F10.20 ALCOHOL USE DISORDER, SEVERE, DEPENDENCE: Primary | ICD-10-CM

## 2020-02-05 LAB
AMPHET+METHAMPHET UR QL: NEGATIVE
BARBITURATES UR QL SCN>200 NG/ML: NEGATIVE
BENZODIAZ UR QL SCN>200 NG/ML: NORMAL
BREATH ALCOHOL: 0
BZE UR QL SCN: NEGATIVE
CANNABINOIDS UR QL SCN: NEGATIVE
CREAT UR-MCNC: 63 MG/DL (ref 15–325)
ETHANOL UR-MCNC: <10 MG/DL
ETHYL GLUCURONIDE: NEGATIVE NG/ML
METHADONE UR QL SCN>300 NG/ML: NEGATIVE
OPIATES UR QL SCN: NEGATIVE
PCP UR QL SCN>25 NG/ML: NEGATIVE
TOXICOLOGY INFORMATION: NORMAL

## 2020-02-05 PROCEDURE — 80307 DRUG TEST PRSMV CHEM ANLYZR: CPT

## 2020-02-05 PROCEDURE — 99232 PR SUBSEQUENT HOSPITAL CARE,LEVL II: ICD-10-PCS | Mod: ,,, | Performed by: PSYCHIATRY & NEUROLOGY

## 2020-02-05 PROCEDURE — 90853 PR GROUP PSYCHOTHERAPY: ICD-10-PCS | Mod: ,,, | Performed by: PSYCHOLOGIST

## 2020-02-05 PROCEDURE — 90853 GROUP PSYCHOTHERAPY: CPT | Mod: ,,, | Performed by: PSYCHOLOGIST

## 2020-02-05 PROCEDURE — 99232 SBSQ HOSP IP/OBS MODERATE 35: CPT | Mod: ,,, | Performed by: PSYCHIATRY & NEUROLOGY

## 2020-02-05 PROCEDURE — 90853 GROUP PSYCHOTHERAPY: CPT
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
## 2020-02-05 NOTE — PROGRESS NOTES
"Group Psychotherapy (PhD/LCSW)    Site: [facility]    Clinical status of patient: Intensive Outpatient Program (IOP)    Date: 2/5/2020    Group Focus: Psychodynamic Group Psychotherapy    Length of service: 08883 - 45-50 minutes    Number of patients in attendance: 5    Referred by: Addictive Behavior Unit Treatment Team    Target symptoms: Alcohol Abuse    Patient's response to treatment: Active Listening    Progress toward goals: Progressing adequately    Interval History: Pt was able to get her mail yesterday, and realizes that she could be "pushing myself" more around her anxiety.     Diagnosis: Alcohol Use Disorder    Plan: Continue treatment on ABU        "

## 2020-02-05 NOTE — PROGRESS NOTES
Group Psychotherapy (PhD/LCSW)    Site: [facility]    Clinical status of patient: Intensive Outpatient Program (IOP)    Date: 2/5/2020    Group Focus: The Dynamics of Relationship       Length of service: 49193 - 45-50 minutes    Number of patients in attendance: 12    Referred by: Addictive Behavior Unit Treatment Team    Target symptoms: Alcohol Abuse    Patient's response to treatment: Active Listening; Self-Disclosure    Progress toward goals: Progressing adequately    Interval History: Discussed strategies for ameliorating dysfunctional patterns in interpersonal dynamics by  delineating one's part in the pattern and changing it rather than trying to change the other person's behavior.  Gave examples of the value of I-messages and Reflective Listening skills in ameliorating dysfunctional patterns of relationship. Demonstrated strategies through modeling, role play, and vignettes.      Diagnosis: Alcohol Use Disorder    Plan: Continue treatment on ABU

## 2020-02-05 NOTE — PATIENT CARE CONFERENCE
"ABU Staffing:    Alcohol Use Disorder, Severe  Benzodiazepine Use, R/o Disorder  Hx MDD  SIMD  R/o Adjustment Disorder w/mixed features  R/o Other unspecified anxiety disorder     1. Pt is attending all groups    2. Pt is attending all meetings  3. Pt 's has minimally supportive family  4. Pt has completed spiritual assessment    5. Pt will present life story    6. Pt will present Step One assignment    7. Pt is exploring issues related to relapse  prevention; spirituality; stress management; improved communication skills; assertiveness training; poor self-esteem; disease concepts; cross addictions; and, work related issues    8. D/C date: TBD         Staffing discussed that pt demonstrates improved affect compared to affect on admit. Staffing discussed pt repeated reports of feeling "agoraphobic" and "anhedonic". Staff discussed psychosocial issues of pt employment still uncertain; [facility]. Staffing discussed working on behavioral strategies to address anxiety and encouraging pt to engage in enjoyable activities. Staffing discussed rx changes including titrating up Effexor and starting naltrexone.       Problem:  Alcohol Use Disorder, severe   Goal: Address in 12 step meetings and group and individual sessions    Objective Measure: participation in groups, self report, length of sobriety, and relapse prevention plan  Time: Prior to discharge    Progress: Pt is attending groups and sessions     Problem:Benzodiazepine Use, R/o Disorder  Goal: Address in 12 step meetings and group and individual sessions    Objective Measure: participation in groups, self report, length of sobriety, and relapse prevention plan  Time: Prior to discharge    Progress: Pt is attending groups and sessions        Problem:  Hx of MDD  Goal: Address in 12 step meetings and group and individual sessions    Objective Measure: participation in groups, self report, length of sobriety, and relapse prevention " plan  Time: Prior to discharge    Progress: Pt is attending groups and sessions    Problem:  SIMD  Goal: Address in 12 step meetings and group and individual sessions    Objective Measure: participation in groups, self report, length of sobriety, and relapse prevention plan  Time: Prior to discharge    Progress: Pt is attending groups and sessions    Problem:  SIMD  Goal: Address in 12 step meetings and group and individual sessions    Objective Measure: participation in groups, self report, length of sobriety, and relapse prevention plan  Time: Prior to discharge    Progress: Pt is attending groups and sessions    Problem: R/o Adjustment Disorder w/mixed features  Goal: Address in 12 step meetings and group and individual sessions    Objective Measure: participation in groups, self report, length of sobriety, and relapse prevention plan  Time: Prior to discharge    Progress: Pt is attending groups and sessions      Problem:  R/o Other unspecified anxiety disorder  Goal: Address in 12 step meetings and group and individual sessions    Objective Measure: participation in groups, self report, length of sobriety, and relapse prevention plan  Time: Prior to discharge    Progress: Pt is attending groups and sessions        Staff members present:    MD Dr. Michael Miranda MD Resident  Dr. Gaby MD Resident  Dr. Willams, PhD  [facility] Re[facility], [facility]  Margie John, [facility]  José Miguel Brennan, [facility]

## 2020-02-05 NOTE — PROGRESS NOTES
Group Psychotherapy (PhD/LCSW)    Site: [facility]    Clinical status of patient: Intensive Outpatient Program (IOP)    Date: 2/5/2020    Group Focus: DBT-Based Group Psychotherapy    Length of service: 73981 - 45-50 minutes    Number of patients in attendance: 12    Referred by: Addictive Behavior Unit Treatment Team    Target symptoms: Alcohol Abuse    Patient's response to treatment: Active Listening; Self-Disclosure    Progress toward goals: Progressing adequately    Interval History: Session focus was Interpersonal Effectiveness:  Validation (Part 2).  Patients practiced validation of others through role-play and examples.    Diagnosis: Alcohol Use Disorder    Plan: Continue treatment on ABU

## 2020-02-06 ENCOUNTER — HOSPITAL ENCOUNTER (OUTPATIENT)
Dept: PSYCHIATRY | Facility: HOSPITAL | Age: 45
Discharge: HOME | End: 2020-02-06
Attending: PSYCHIATRY & NEUROLOGY
Payer: COMMERCIAL

## 2020-02-06 DIAGNOSIS — F33.0 MAJOR DEPRESSIVE DISORDER, RECURRENT, MILD: ICD-10-CM

## 2020-02-06 DIAGNOSIS — F10.20 ALCOHOL USE DISORDER, SEVERE, DEPENDENCE: Primary | ICD-10-CM

## 2020-02-06 LAB — BREATH ALCOHOL: 0

## 2020-02-06 PROCEDURE — 90853 GROUP PSYCHOTHERAPY: CPT | Performed by: SOCIAL WORKER

## 2020-02-06 PROCEDURE — 90853 GROUP PSYCHOTHERAPY: CPT | Mod: ,,, | Performed by: PSYCHOLOGIST

## 2020-02-06 PROCEDURE — 90853 PR GROUP PSYCHOTHERAPY: ICD-10-PCS | Mod: ,,, | Performed by: PSYCHOLOGIST

## 2020-02-06 PROCEDURE — 90853 GROUP PSYCHOTHERAPY: CPT

## 2020-02-06 NOTE — PROGRESS NOTES
Group Psychotherapy (PhD/LCSW)    Site: [facility]    Clinical status of patient: Intensive Outpatient Program (IOP)    Date: 2/6/2020    Group Focus: Psychodynamic Group Psychotherapy    Length of service: 49016 - 45-50 minutes    Number of patients in attendance: 4    Referred by: Addictive Behavior Unit Treatment Team    Target symptoms: Alcohol Abuse    Patient's response to treatment: Active Listening    Progress toward goals: Progressing adequately    Interval History: Pt reports that she is doing well and felt calm last night. She slept well and made herself dinner for the first time in a while. She reports no cravings and no use of Trazodone.    Diagnosis: Alcohol Use Disorder    Plan: Continue treatment on ABU

## 2020-02-06 NOTE — PLAN OF CARE
02/06/20 1000   Activity/Group Therapy Checklist   Group Relapse Prevention   Attendance Attended   Follows Direction Followed directions   Group Interactions/Observations Interacted appropriately   Affect/Mood Range Normal range   Affect/Mood Display Appropriate   Goal Progression Progressing

## 2020-02-07 ENCOUNTER — HOSPITAL ENCOUNTER (OUTPATIENT)
Dept: PSYCHIATRY | Facility: HOSPITAL | Age: 45
Discharge: HOME | End: 2020-02-07
Attending: PSYCHIATRY & NEUROLOGY
Payer: COMMERCIAL

## 2020-02-07 VITALS — SYSTOLIC BLOOD PRESSURE: 95 MMHG | HEART RATE: 70 BPM | DIASTOLIC BLOOD PRESSURE: 58 MMHG

## 2020-02-07 DIAGNOSIS — F41.1 GAD (GENERALIZED ANXIETY DISORDER): Chronic | ICD-10-CM

## 2020-02-07 DIAGNOSIS — F33.0 MAJOR DEPRESSIVE DISORDER, RECURRENT, MILD: ICD-10-CM

## 2020-02-07 DIAGNOSIS — F13.90 SEDATIVE, HYPNOTIC OR ANXIOLYTIC USE DISORDER, MODERATE, IN CONTROLLED ENVIRONMENT: ICD-10-CM

## 2020-02-07 DIAGNOSIS — F10.20 ALCOHOL USE DISORDER, SEVERE, DEPENDENCE: Primary | ICD-10-CM

## 2020-02-07 LAB
AMPHET+METHAMPHET UR QL: NEGATIVE
BARBITURATES UR QL SCN>200 NG/ML: NEGATIVE
BENZODIAZ UR QL SCN>200 NG/ML: NORMAL
BREATH ALCOHOL: 0
BZE UR QL SCN: NEGATIVE
CANNABINOIDS UR QL SCN: NEGATIVE
CREAT UR-MCNC: 49 MG/DL (ref 15–325)
ETHANOL UR-MCNC: <10 MG/DL
METHADONE UR QL SCN>300 NG/ML: NEGATIVE
OPIATES UR QL SCN: NEGATIVE
PCP UR QL SCN>25 NG/ML: NEGATIVE
TOXICOLOGY INFORMATION: NORMAL

## 2020-02-07 PROCEDURE — 90853 GROUP PSYCHOTHERAPY: CPT | Performed by: SOCIAL WORKER

## 2020-02-07 PROCEDURE — 80307 DRUG TEST PRSMV CHEM ANLYZR: CPT

## 2020-02-07 PROCEDURE — 99232 PR SUBSEQUENT HOSPITAL CARE,LEVL II: ICD-10-PCS | Mod: ,,, | Performed by: PSYCHIATRY & NEUROLOGY

## 2020-02-07 PROCEDURE — 80346 BENZODIAZEPINES1-12: CPT

## 2020-02-07 PROCEDURE — 99232 SBSQ HOSP IP/OBS MODERATE 35: CPT | Mod: ,,, | Performed by: PSYCHIATRY & NEUROLOGY

## 2020-02-07 PROCEDURE — 90853 PR GROUP PSYCHOTHERAPY: ICD-10-PCS | Mod: ,,, | Performed by: PSYCHOLOGIST

## 2020-02-07 PROCEDURE — 90853 GROUP PSYCHOTHERAPY: CPT

## 2020-02-07 PROCEDURE — 90853 GROUP PSYCHOTHERAPY: CPT | Mod: ,,, | Performed by: PSYCHOLOGIST

## 2020-02-07 RX ORDER — VENLAFAXINE HYDROCHLORIDE 75 MG/1
75 CAPSULE, EXTENDED RELEASE ORAL DAILY
Qty: 30 CAPSULE | Refills: 0 | Status: ON HOLD | OUTPATIENT
Start: 2020-02-07 | End: 2024-03-30

## 2020-02-07 NOTE — PROGRESS NOTES
Group Psychotherapy (PhD/LCSW)    Site: [facility]    Clinical status of patient: Intensive Outpatient Program (IOP)    Date: 2/7/2020    Group Focus: DBT-Based Group Psychotherapy    Length of service: 84902 - 45-50 minutes    Number of patients in attendance: 13    Referred by: Addictive Behavior Unit Treatment Team    Target symptoms: Alcohol Abuse    Patient's response to treatment: Active Listening; Self-Disclosure    Progress toward goals: Progressing adequately    Interval History: Session focus was Interpersonal Effectiveness:  Validation (Part 3).  Patients practiced validation of others through role-play and examples.    Diagnosis: Alcohol Use Disorder    Plan: Continue treatment on ABU

## 2020-02-07 NOTE — PLAN OF CARE
02/07/20 1300   Activity/Group Therapy Checklist   Group Cognitive Therapy  (Discussed anxiety over work situation and ways to cope, discussed decreased depressive symptoms, discussed weekend plans, and set commitments for the weekend)   Attendance Attended   Follows Direction Followed directions   Group Interactions/Observations Interacted appropriately   Affect/Mood Range Normal range   Affect/Mood Display Appropriate   Goal Progression Progressing
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
## 2020-02-07 NOTE — PLAN OF CARE
02/07/20 1000   Activity/Group Therapy Checklist   Group Goals/Reflection   Attendance Attended   Follows Direction Followed directions   Group Interactions/Observations Interacted appropriately   Affect/Mood Range Normal range   Affect/Mood Display Appropriate   Goal Progression Progressing

## 2020-02-10 ENCOUNTER — HOSPITAL ENCOUNTER (OUTPATIENT)
Dept: PSYCHIATRY | Facility: HOSPITAL | Age: 45
Discharge: HOME | End: 2020-02-10
Attending: PSYCHIATRY & NEUROLOGY
Payer: COMMERCIAL

## 2020-02-10 VITALS — SYSTOLIC BLOOD PRESSURE: 102 MMHG | HEART RATE: 101 BPM | DIASTOLIC BLOOD PRESSURE: 67 MMHG

## 2020-02-10 DIAGNOSIS — F41.1 GAD (GENERALIZED ANXIETY DISORDER): Chronic | ICD-10-CM

## 2020-02-10 DIAGNOSIS — F13.90 SEDATIVE, HYPNOTIC OR ANXIOLYTIC USE DISORDER, MODERATE, IN CONTROLLED ENVIRONMENT: ICD-10-CM

## 2020-02-10 DIAGNOSIS — F10.20 ALCOHOL USE DISORDER, SEVERE, DEPENDENCE: Primary | ICD-10-CM

## 2020-02-10 DIAGNOSIS — F33.0 MAJOR DEPRESSIVE DISORDER, RECURRENT, MILD: ICD-10-CM

## 2020-02-10 LAB
AMPHET+METHAMPHET UR QL: NEGATIVE
BARBITURATES UR QL SCN>200 NG/ML: NEGATIVE
BENZODIAZ UR QL SCN>200 NG/ML: NORMAL
BREATH ALCOHOL: 0
BZE UR QL SCN: NEGATIVE
CANNABINOIDS UR QL SCN: NEGATIVE
CREAT UR-MCNC: 140 MG/DL (ref 15–325)
ETHANOL UR-MCNC: <10 MG/DL
METHADONE UR QL SCN>300 NG/ML: NEGATIVE
OPIATES UR QL SCN: NEGATIVE
PCP UR QL SCN>25 NG/ML: NEGATIVE
TOXICOLOGY INFORMATION: NORMAL

## 2020-02-10 PROCEDURE — 90853 GROUP PSYCHOTHERAPY: CPT | Mod: ,,, | Performed by: PSYCHOLOGIST

## 2020-02-10 PROCEDURE — 80307 DRUG TEST PRSMV CHEM ANLYZR: CPT

## 2020-02-10 PROCEDURE — 99232 SBSQ HOSP IP/OBS MODERATE 35: CPT | Mod: ,,, | Performed by: PSYCHIATRY & NEUROLOGY

## 2020-02-10 PROCEDURE — 90853 PR GROUP PSYCHOTHERAPY: ICD-10-PCS | Mod: ,,, | Performed by: PSYCHOLOGIST

## 2020-02-10 PROCEDURE — 90853 GROUP PSYCHOTHERAPY: CPT

## 2020-02-10 PROCEDURE — 99232 PR SUBSEQUENT HOSPITAL CARE,LEVL II: ICD-10-PCS | Mod: ,,, | Performed by: PSYCHIATRY & NEUROLOGY

## 2020-02-10 PROCEDURE — 90853 GROUP PSYCHOTHERAPY: CPT | Performed by: SOCIAL WORKER

## 2020-02-10 NOTE — PROGRESS NOTES
Group Psychotherapy (PhD/LCSW)    Site: [facility]    Clinical status of patient: Intensive Outpatient Program (IOP)    Date: 2/10/2020    Group Focus: DBT-Based Group Psychotherapy    Length of service: 01512 - 45-50 minutes    Number of patients in attendance: 12    Referred by: Addictive Behavior Unit Treatment Team    Target symptoms: Alcohol Abuse    Patient's response to treatment: Active Listening; Self-Disclosure    Progress toward goals: Progressing adequately    Interval History: Session focus was Mindfulness.  Patients were introduced to the how skills of mindfulness to practice in a non-judgmental, one-mindful, and effective way.  Practice exercises focusing on non-judgment were emphasized.    Diagnosis: Alcohol Use Disorder    Plan: Continue treatment on ABU

## 2020-02-10 NOTE — PLAN OF CARE
02/10/20 1000   Activity/Group Therapy Checklist   Group Addiction Education  (family roles )   Attendance Attended   Follows Direction Followed directions   Group Interactions/Observations Interacted appropriately   Affect/Mood Range Normal range   Affect/Mood Display Appropriate   Goal Progression Progressing
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
## 2020-02-10 NOTE — PROGRESS NOTES
"Group Psychotherapy (PhD/LCSW)    Site: [facility]    Clinical status of patient: Intensive Outpatient Program (IOP)    Date: 2/10/2020    Group Focus: Psychodynamic Group Psychotherapy    Length of service: 16574 - 45-50 minutes    Number of patients in attendance: 5    Referred by: Addictive Behavior Unit Treatment Team    Target symptoms: Alcohol Abuse    Patient's response to treatment: Active Listening    Progress toward goals: Progressing adequately    Interval History: Pt reports that she had a good weekend - she cooked, walked the dogs, spent good time with . Reflected to pt that getting back to these "normal" tasks represent progress for her, which she had been having trouble seeing on her own.    Diagnosis: Alcohol Use Disorder    Plan: Continue treatment on ABU        "

## 2020-02-10 NOTE — PROGRESS NOTES
02/10/20 1400   Activity/Group Therapy Checklist   Group Educational  (Acceptance and Committment Therapy)   Attendance Attended   Follows Direction Followed directions   Group Interactions/Observations Drowsy;Sharing  (Discussed ruminations re: employment stressors and strategies to address this)   Affect/Mood Range Normal range   Affect/Mood Display Appropriate;Calm   Goal Progression Progressing

## 2020-02-11 ENCOUNTER — HOSPITAL ENCOUNTER (OUTPATIENT)
Dept: PSYCHIATRY | Facility: HOSPITAL | Age: 45
Discharge: HOME | End: 2020-02-11
Attending: PSYCHIATRY & NEUROLOGY
Payer: COMMERCIAL

## 2020-02-11 DIAGNOSIS — F10.20 ALCOHOL USE DISORDER, SEVERE, DEPENDENCE: Primary | ICD-10-CM

## 2020-02-11 DIAGNOSIS — F33.0 MAJOR DEPRESSIVE DISORDER, RECURRENT, MILD: ICD-10-CM

## 2020-02-11 DIAGNOSIS — F41.1 GAD (GENERALIZED ANXIETY DISORDER): ICD-10-CM

## 2020-02-11 DIAGNOSIS — F13.90 SEDATIVE, HYPNOTIC OR ANXIOLYTIC USE DISORDER, MODERATE, IN CONTROLLED ENVIRONMENT: ICD-10-CM

## 2020-02-11 PROCEDURE — 90853 PR GROUP PSYCHOTHERAPY: ICD-10-PCS | Mod: ,,, | Performed by: PSYCHOLOGIST

## 2020-02-11 PROCEDURE — 90853 GROUP PSYCHOTHERAPY: CPT | Mod: ,,, | Performed by: PSYCHOLOGIST

## 2020-02-11 PROCEDURE — 90834 PR PSYCHOTHERAPY W/PATIENT, 45 MIN: ICD-10-PCS | Mod: 59,,, | Performed by: PSYCHOLOGIST

## 2020-02-11 PROCEDURE — 90834 PSYTX W PT 45 MINUTES: CPT | Mod: 59,,, | Performed by: PSYCHOLOGIST

## 2020-02-11 NOTE — PROGRESS NOTES
Group Psychotherapy (PhD/LCSW)    Site: [facility]    Clinical status of patient: Intensive Outpatient Program (IOP)    Date: 2/11/2020    Group Focus: DBT-Based Group Psychotherapy    Length of service: 73010 - 45-50 minutes    Number of patients in attendance: 12    Referred by: Addictive Behavior Unit Treatment Team    Target symptoms: Alcohol Abuse    Patient's response to treatment: Active Listening; Self-Disclosure    Progress toward goals: Progressing adequately    Interval History: Session focus was Interpersonal Effectiveness:  Validation (Part 4). Patients were encouraged to focus on validation of themselves by enhancing their ability to hear, understand, and respect themselves.    Diagnosis: Alcohol Use Disorder    Plan: Continue treatment on ABU

## 2020-02-11 NOTE — PROGRESS NOTES
[facility]  Psychology  Progress Note  Individual Psychotherapy (PhD/LCSW)    Patient Name: Brooke Schwartz  MRN: [id number]    Patient Class: OP- Behavioral Recurring   Admission Date: 2/11/2020  Hospital Length of Stay: 0 days  Attending Physician: Turner Alexander MD  Primary Care Provider: Lefty Solis MD    Therapeutic Intervention:   Pt reports that she doing well so far in the ABU and AA. She denies being bothered by cravings to use. She said her depression is better. She is finding temporary respite from her anxiety at AA meetings where she feels accepted and understood. However anxiety is identified as her major problem at present and it centers on her uncertainty about where she stands at work. She has heard no word about what the administration intends to do with her post tx on the ABU. She said the anxiety tends to dominate her feelings and thoughts. We discussed her fears of being fired, having a hard time finding another job, losing her financial resources, etc. She was able to see that she would ultimately be able to reestablish herself in a practice and even if the situation was not ideal, she could live a life of recovery and contentment. 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 She noted that these options were reasonable and thinking about them had an immediate effect of lowering her anxiety. Pt noted also that she was impatient to feel better but she remembered from her last rehab that it took about 2 months of recovery before she started to really feel happy again and she has been sober for only about 2 weeks.She notes that things are going well with her spouse and that she is starting to experience moments of peace and well-being in the morning when wakes up and at AA meetings.     Risk Parameters:  Patient reports no suicidal ideation  Patient reports no  homicidal ideation  Patient reports no self-injurious behavior  Patient reports no violent behavior    Verbal Deficits: None    Patient's response to intervention:  The patient's response to intervention is accepting.    Progress toward goals and other mental status changes:  The patient's progress toward goals is fair .    Diagnostic Impression - Plan:     Diagnosis: Alcohol Use d/o, severe, dependence    Treatment Plan:  · Target symptoms: recurrent depression, anxiety , substance abuse  · Why chosen therapy is appropriate versus another modality: relevant to diagnosis, patient responds to this modality  · Outcome monitoring methods: self-report, observation, chart notes   · Therapeutic intervention type: insight oriented psychotherapy    Plan:  ABU    Return to Clinic: as scheduled    Length of Service (minutes): 45    Donaldo Anna, PhD  Psychology  [facility]

## 2020-02-11 NOTE — PROGRESS NOTES
Group Psychotherapy (PhD/LCSW)    Site: [facility]    Clinical status of patient: Intensive Outpatient Program (IOP)    Date: 2/11/2020    Group Focus: Psychodynamic Group Psychotherapy    Length of service: 36854 - 45-50 minutes    Number of patients in attendance: 7    Referred by: Addictive Behavior Unit Treatment Team    Target symptoms: Alcohol Abuse    Patient's response to treatment: Active Listening    Progress toward goals: Progressing adequately    Interval History: Pt reports that she is doing well, though she appeared drowsy in group. She states she is sleeping well, but now that she is feeling more peaceful, she believes that the adrenaline is decreasing and her body feels tired a lot. She did walk again last night.    Diagnosis: Alcohol Use Disorder    Plan: Continue treatment on ABU

## 2020-02-11 NOTE — PROGRESS NOTES
Group Psychotherapy (PhD/LCSW)    Site: [facility]    Clinical status of patient: Intensive Outpatient Program (IOP)    Date: 2/11/2020    Group Focus: Stress Management       Length of service: 13251 - 45-50 minutes    Number of patients in attendance: 10    Referred by: Addictive Behavior Unit Treatment Team    Target symptoms: Alcohol Abuse    Patient's response to treatment: Active Listening    Progress toward goals: Progressing adequately    Interval History: Discussed a basic model of stress management and demonstrated stress management strategies derived from the model with an emphasis on the role of changing one's self-talk and challenging maladaptive beliefs. Illustrated the the use of these strategies through modeling, role play, and vignettes.  Diagnosis: Alcohol Use Disorder    Plan: Continue treatment on ABU

## 2020-02-12 ENCOUNTER — HOSPITAL ENCOUNTER (OUTPATIENT)
Dept: PSYCHIATRY | Facility: HOSPITAL | Age: 45
Discharge: HOME | End: 2020-02-12
Attending: PSYCHIATRY & NEUROLOGY
Payer: COMMERCIAL

## 2020-02-12 VITALS — SYSTOLIC BLOOD PRESSURE: 90 MMHG | DIASTOLIC BLOOD PRESSURE: 60 MMHG | HEART RATE: 76 BPM

## 2020-02-12 DIAGNOSIS — F13.90 SEDATIVE, HYPNOTIC OR ANXIOLYTIC USE DISORDER, MODERATE, IN CONTROLLED ENVIRONMENT: ICD-10-CM

## 2020-02-12 DIAGNOSIS — F41.1 GAD (GENERALIZED ANXIETY DISORDER): Chronic | ICD-10-CM

## 2020-02-12 DIAGNOSIS — F10.20 ALCOHOL USE DISORDER, SEVERE, DEPENDENCE: Primary | ICD-10-CM

## 2020-02-12 DIAGNOSIS — F33.0 MAJOR DEPRESSIVE DISORDER, RECURRENT, MILD: ICD-10-CM

## 2020-02-12 LAB
7AMINOCLONAZEPAM UR CFM-MCNC: NEGATIVE NG/ML
7AMINOFLUNITRAZEPAM UR CFM-MCNC: NEGATIVE NG/ML
A-OH ALPRAZ UR CFM-MCNC: NEGATIVE NG/ML
A-OH-TRIAZOLAM UR CFM-MCNC: NEGATIVE NG/ML
AMPHET+METHAMPHET UR QL: NEGATIVE
BARBITURATES UR QL SCN>200 NG/ML: NEGATIVE
BENZODIAZ UR QL SCN>200 NG/ML: NORMAL
BENZODIAZEPINE CONF CHAIN OF CUSTODY: NORMAL
BREATH ALCOHOL: 0
BZE UR QL SCN: NEGATIVE
CANNABINOIDS UR QL SCN: NEGATIVE
CREAT UR-MCNC: 155 MG/DL (ref 15–325)
ETHANOL UR-MCNC: <10 MG/DL
ETHYL GLUCURONIDE: NEGATIVE NG/ML
LORAZEPAM UR CFM-MCNC: NEGATIVE NG/ML
METHADONE UR QL SCN>300 NG/ML: NEGATIVE
NORDIAZEPAM UR CFM-MCNC: NEGATIVE NG/ML
OH-ETHYLFLURAZ UR CFM-MCNC: NEGATIVE NG/ML
OPIATES UR QL SCN: NEGATIVE
OXAZEPAM UR CFM-MCNC: 171 NG/ML
PCP UR QL SCN>25 NG/ML: NEGATIVE
TEMAZEPAM UR CFM-MCNC: NEGATIVE NG/ML
TOXICOLOGIST REVIEW: NORMAL
TOXICOLOGY INFORMATION: NORMAL

## 2020-02-12 PROCEDURE — 90853 PR GROUP PSYCHOTHERAPY: ICD-10-PCS | Mod: ,,, | Performed by: PSYCHOLOGIST

## 2020-02-12 PROCEDURE — 90853 GROUP PSYCHOTHERAPY: CPT | Mod: ,,, | Performed by: PSYCHOLOGIST

## 2020-02-12 PROCEDURE — 90853 GROUP PSYCHOTHERAPY: CPT

## 2020-02-12 PROCEDURE — 80307 DRUG TEST PRSMV CHEM ANLYZR: CPT

## 2020-02-12 PROCEDURE — 99232 PR SUBSEQUENT HOSPITAL CARE,LEVL II: ICD-10-PCS | Mod: ,,, | Performed by: PSYCHIATRY & NEUROLOGY

## 2020-02-12 PROCEDURE — 90853 GROUP PSYCHOTHERAPY: CPT | Performed by: SOCIAL WORKER

## 2020-02-12 PROCEDURE — 99232 SBSQ HOSP IP/OBS MODERATE 35: CPT | Mod: ,,, | Performed by: PSYCHIATRY & NEUROLOGY

## 2020-02-12 NOTE — PROGRESS NOTES
Group Psychotherapy (PhD/LCSW)    [facility]    Clinical status of patient: Intensive Outpatient Program (IOP)    Date: 2/12/2020    Group Focus: Stress Management       Length of service: 32014 - 45-50 minutes    Number of patients in attendance: 11    Referred by: Addictive Behavior Unit Treatment Team    Target symptoms: Alcohol Abuse    Patient's response to treatment: Active Listening    Progress toward goals: Progressing adequately    Interval History: Discussed the application of 12-step principles to stress management with a particular emphasis on the first three steps of recovery. Provided vignettes to illustrate the value of these principles for enhancing coping.     Diagnosis: Alcohol Use Disorder    Plan: Continue treatment on ABU

## 2020-02-12 NOTE — PROGRESS NOTES
Group Psychotherapy (PhD/LCSW)    Site: [facility]    Clinical status of patient: Intensive Outpatient Program (IOP)    Date: 2/12/2020    Group Focus: DBT-Based Group Psychotherapy    Length of service: 05861 - 45-50 minutes    Number of patients in attendance: 12    Referred by: Addictive Behavior Unit Treatment Team    Target symptoms: Alcohol Abuse    Patient's response to treatment: Active Listening; Self-Disclosure    Progress toward goals: Progressing adequately    Interval History: Session focus was Interpersonal Effectiveness:  DEAR MAN.  Patients were encouraged to exercise skillfulness during interactions by using this framework.    Diagnosis: Alcohol Use Disorder    Plan: Continue treatment on ABU
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
## 2020-02-12 NOTE — PLAN OF CARE
02/12/20 1400   Activity/Group Therapy Checklist   Group Educational  (rigorous honesty )   Attendance Attended   Follows Direction Followed directions   Group Interactions/Observations Interacted appropriately   Affect/Mood Range Normal range   Affect/Mood Display Appropriate   Goal Progression Progressing
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
## 2020-02-12 NOTE — PROGRESS NOTES
"Group Psychotherapy (PhD/LCSW)    Site: [facility]    Clinical status of patient: Intensive Outpatient Program (IOP)    Date: 2/12/2020    Group Focus: Psychodynamic Group Psychotherapy    Length of service: 35014 - 45-50 minutes    Number of patients in attendance: 5    Referred by: Addictive Behavior Unit Treatment Team    Target symptoms: Alcohol Abuse    Patient's response to treatment: Active Listening    Progress toward goals: Progressing adequately    Interval History: Pt reports that she is feeling "disconnected", reporting that she believes the Naltrexone is helping with drinking cravings but also flatlining her desire for anything. She states she is trying to be patient with this process. Continue to work on encouraging her to slowly shift some lifestyle factors to increase her sense of guy and connectedness in her life without alcohol.    Diagnosis: Alcohol Use Disorder    Plan: Continue treatment on ABU        "

## 2020-02-13 ENCOUNTER — HOSPITAL ENCOUNTER (OUTPATIENT)
Dept: PSYCHIATRY | Facility: HOSPITAL | Age: 45
Discharge: HOME | End: 2020-02-13
Attending: PSYCHIATRY & NEUROLOGY
Payer: COMMERCIAL

## 2020-02-13 DIAGNOSIS — F33.0 MAJOR DEPRESSIVE DISORDER, RECURRENT, MILD: ICD-10-CM

## 2020-02-13 DIAGNOSIS — F10.20 ALCOHOL USE DISORDER, SEVERE, DEPENDENCE: Primary | ICD-10-CM

## 2020-02-13 LAB — BREATH ALCOHOL: 0

## 2020-02-13 PROCEDURE — 90853 GROUP PSYCHOTHERAPY: CPT | Mod: ,,, | Performed by: PSYCHOLOGIST

## 2020-02-13 PROCEDURE — 90853 PR GROUP PSYCHOTHERAPY: ICD-10-PCS | Mod: ,,, | Performed by: PSYCHOLOGIST

## 2020-02-13 PROCEDURE — 90853 GROUP PSYCHOTHERAPY: CPT | Performed by: SOCIAL WORKER

## 2020-02-13 PROCEDURE — 90853 GROUP PSYCHOTHERAPY: CPT

## 2020-02-13 NOTE — PLAN OF CARE
02/13/20 1000   Activity/Group Therapy Checklist   Group Goals/Reflection   Attendance Attended   Follows Direction Followed directions   Group Interactions/Observations Interacted appropriately   Affect/Mood Range Normal range   Affect/Mood Display Appropriate   Goal Progression Progressing

## 2020-02-13 NOTE — PROGRESS NOTES
Group Psychotherapy (PhD/LCSW)    Site: [facility]    Clinical status of patient: Intensive Outpatient Program (IOP)    Date: 2/13/2020    Group Focus: Psychodynamic Group Psychotherapy    Length of service: 81500 - 45-50 minutes    Number of patients in attendance: 6    Referred by: Addictive Behavior Unit Treatment Team    Target symptoms: Alcohol Abuse    Patient's response to treatment: Active Listening    Progress toward goals: Progressing adequately    Interval History: Pt asked group for feedback about how to talk to her  about his anxiety-inducing questions about her return to work, and was open to suggestions.    Diagnosis: Alcohol Use Disorder    Plan: Continue treatment on ABU

## 2020-02-13 NOTE — PLAN OF CARE
02/13/20 1400   Activity/Group Therapy Checklist   Group Relapse Prevention  (Step Work )   Attendance Attended   Follows Direction Followed directions   Group Interactions/Observations Interacted appropriately   Affect/Mood Range Normal range   Affect/Mood Display Appropriate   Goal Progression Progressing

## 2020-02-14 ENCOUNTER — HOSPITAL ENCOUNTER (OUTPATIENT)
Dept: PSYCHIATRY | Facility: HOSPITAL | Age: 45
Discharge: HOME | End: 2020-02-14
Attending: PSYCHIATRY & NEUROLOGY
Payer: COMMERCIAL

## 2020-02-14 VITALS — HEART RATE: 71 BPM | DIASTOLIC BLOOD PRESSURE: 55 MMHG | SYSTOLIC BLOOD PRESSURE: 95 MMHG

## 2020-02-14 DIAGNOSIS — F10.20 ALCOHOL USE DISORDER, SEVERE, DEPENDENCE: Primary | ICD-10-CM

## 2020-02-14 DIAGNOSIS — F41.1 GAD (GENERALIZED ANXIETY DISORDER): Chronic | ICD-10-CM

## 2020-02-14 DIAGNOSIS — F33.0 MAJOR DEPRESSIVE DISORDER, RECURRENT, MILD: Chronic | ICD-10-CM

## 2020-02-14 DIAGNOSIS — F13.90 SEDATIVE, HYPNOTIC OR ANXIOLYTIC USE DISORDER, MODERATE, IN CONTROLLED ENVIRONMENT: ICD-10-CM

## 2020-02-14 LAB
AMPHET+METHAMPHET UR QL: NEGATIVE
BARBITURATES UR QL SCN>200 NG/ML: NEGATIVE
BENZODIAZ UR QL SCN>200 NG/ML: NORMAL
BREATH ALCOHOL: 0
BZE UR QL SCN: NEGATIVE
CANNABINOIDS UR QL SCN: NEGATIVE
CREAT UR-MCNC: 125 MG/DL (ref 15–325)
ETHANOL UR-MCNC: <10 MG/DL
METHADONE UR QL SCN>300 NG/ML: NEGATIVE
OPIATES UR QL SCN: NEGATIVE
PCP UR QL SCN>25 NG/ML: NEGATIVE
TOXICOLOGY INFORMATION: NORMAL

## 2020-02-14 PROCEDURE — 90853 PR GROUP PSYCHOTHERAPY: ICD-10-PCS | Mod: 59,,, | Performed by: PSYCHOLOGIST

## 2020-02-14 PROCEDURE — 99232 SBSQ HOSP IP/OBS MODERATE 35: CPT | Mod: ,,, | Performed by: PSYCHIATRY & NEUROLOGY

## 2020-02-14 PROCEDURE — 80307 DRUG TEST PRSMV CHEM ANLYZR: CPT

## 2020-02-14 PROCEDURE — 90853 GROUP PSYCHOTHERAPY: CPT | Mod: 59,,, | Performed by: PSYCHOLOGIST

## 2020-02-14 PROCEDURE — 90853 GROUP PSYCHOTHERAPY: CPT | Performed by: SOCIAL WORKER

## 2020-02-14 PROCEDURE — 90853 GROUP PSYCHOTHERAPY: CPT

## 2020-02-14 PROCEDURE — 99232 PR SUBSEQUENT HOSPITAL CARE,LEVL II: ICD-10-PCS | Mod: ,,, | Performed by: PSYCHIATRY & NEUROLOGY

## 2020-02-14 NOTE — PLAN OF CARE
02/14/20 1000   Activity/Group Therapy Checklist   Group Goals/Reflection  (incomplete prompts     )   Attendance Attended   Follows Direction Followed directions   Group Interactions/Observations Interacted appropriately   Affect/Mood Range Normal range   Affect/Mood Display Appropriate   Goal Progression Progressing

## 2020-02-14 NOTE — PROGRESS NOTES
"ABU INTENSIVE OUTPATIENT PROGRAM  PROGRESS NOTE    DEPARTMENT:  Psychiatry  SITE: [facility]    EXAMINING PRACTITIONER: Turner Alexander      SUBJECTIVE:     HISTORY    Patient Name: Brooke Schwartz  YOB: 1975    CHIEF COMPLAINT   Brooke Schwartz is a 44 y.o. female currently enrolled in the ABU intensive outpatient program who presents with the chief complaint of substance use disorder.      HPI & PSYCHIATRIC ROS    Patient states anxiety is attenuated.  She is sleeping well without sleep aid.  She feels she is more capable of handling life stressors.  We spoke about weekend plans.  She thinks it is possible she could have some fun this weekend, which is an improvement.  We spoke about relationship with .  Psychotropic regimen discussed.    WITHDRAWAL SYMPTOMS: no  CRAVINGS: no      PFSH: The patient's past medical history has been reviewed and updated as appropriate within the electronic medical record system.      PSYCHOTROPIC MEDICATIONS   Effexor XR 150mg daily  Naltrexone 50mg daily  Neurontin 300mg tid      OBJECTIVE:     EXAMINATION    There were no vitals filed for this visit.    CONSTITUTIONAL  General Appearance: stated age, casually dressed, thin    PSYCHIATRIC   Speech: conversational, spontaneous  Language: fluent english, repeats words/phrases  Mood: "less anxious"  Affect: subdued, still somewhat anxious but able to smile more easily  Thought Process: linear, less ruminative  Associations: intact, no loosening of associations  Thought Content: no SI  Insight: improving  Judgment: improving  Orientation: to person, place, time      ASSESSMENT:     DIAGNOSES & PROBLEMS    Alcohol Use Disorder  Sedative Hypnotic Use Disorder  MDD  ANDI    CONDITION  gradually improving    PROGRESS  Progressing slowly    MOTIVATION TO PURSUE RECOVERY: moderate    ACCEPTANCE OF ADDICTION: fair    ABILITY TO ADHERE TO TREATMENT PLAN: moderate      PLAN:       MANAGEMENT PLAN, " TREATMENT GOALS, THERAPEUTIC TECHNIQUES/APPROACHES & CLINICAL REASONING    Continue effexor xr at current dose - further time is needed to assess response - we may titrate further in about a week's time.  She is no longer taking trazodone prn but has it in case she needs it.  Continue naltrexone for cravings.  Continue gabapentin.  Continue supportive psychotherapy.    · Continue treatment in the ABU.  · Continue patient on ABU protocol.  · Breathalyzer and urine toxicology daily.  · Full engagement in 12 step recovery program(s), including mandatory meeting attendance.  · Patient counseled on abstinence from alcohol and substances of abuse (illicit and prescription).  · Relapse prevention and motivational interviewing provided.      DIAGNOSTIC TESTING  The chart was reviewed for recent diagnostic investigations, and results returned over the past 36 hours are noted below.    Recent Results (from the past 36 hour(s))   POCT BREATH ALCOHOL TEST    Collection Time: 02/13/20  4:00 PM   Result Value Ref Range    Breath Alcohol 0.00

## 2020-02-14 NOTE — PROGRESS NOTES
Group Psychotherapy (PhD/LCSW)     Site: [facility]     Clinical status of patient: Intensive Outpatient Program (IOP)     Date: 2/13/2020     Group Focus: Promoting Healthy Lifestyles     Length of service: 70485 - 45-50 minutes     Number of patients in attendance: 12     Referred by: Addictive Behavior Unit Treatment Team     Target symptoms: Depression and Anxiety     Patient's response to treatment: Active Listening and Self-disclosure     Progress toward goals: Progressing adequately     Interval History:  Session focus was on sleep hygiene and CBT sleep strategies.     Diagnosis: Alcohol Use Disorder     Plan: Continue treatment on ABU.

## 2020-02-14 NOTE — PROGRESS NOTES
Group Psychotherapy (PhD/LCSW)    Site: [facility]    Clinical status of patient: Intensive Outpatient Program (IOP)    Date: 2/14/2020    Group Focus: Stress Management    Length of service: 21945 - 45-50 minutes    Number of patients in attendance: 11    Referred by: Addictive Behavior Unit Treatment Team    Target symptoms: Alcohol Abuse    Patient's response to treatment: Active Listening    Progress toward goals: Progressing adequately    Interval History: Group learned and practiced mindfulness exercises (progressive muscle relaxation) to improve present-moment awareness, impulsive behavior tendencies, and tolerance of various emotional states.    Diagnosis: Alcohol Use Disorder    Plan: Continue treatment on ABU

## 2020-02-14 NOTE — PROGRESS NOTES
Group Psychotherapy (PhD/LCSW)    Site: [facility]    Clinical status of patient: Intensive Outpatient Program (IOP)    Date: 2/14/2020    Group Focus: Psychodynamic Group Psychotherapy    Length of service: 47796 - 45-50 minutes    Number of patients in attendance: 5    Referred by: Addictive Behavior Unit Treatment Team    Target symptoms: Alcohol Abuse    Patient's response to treatment: Active Listening    Progress toward goals: Progressing adequately    Interval History: Pt reports she is doing well; she has noticed that she is working on letting go of resentments. Pt continued to get feedback about having a conversation with her  regarding his questioning and committed to talking to him this weekend.    Diagnosis: Alcohol Use Disorder    Plan: Continue treatment on ABU

## 2020-02-17 ENCOUNTER — HOSPITAL ENCOUNTER (OUTPATIENT)
Dept: PSYCHIATRY | Facility: HOSPITAL | Age: 45
Discharge: HOME | End: 2020-02-17
Attending: PSYCHIATRY & NEUROLOGY
Payer: COMMERCIAL

## 2020-02-17 VITALS — DIASTOLIC BLOOD PRESSURE: 65 MMHG | HEART RATE: 84 BPM | SYSTOLIC BLOOD PRESSURE: 95 MMHG

## 2020-02-17 DIAGNOSIS — F41.1 GAD (GENERALIZED ANXIETY DISORDER): Chronic | ICD-10-CM

## 2020-02-17 DIAGNOSIS — F13.90 SEDATIVE, HYPNOTIC OR ANXIOLYTIC USE DISORDER, MODERATE, IN CONTROLLED ENVIRONMENT: ICD-10-CM

## 2020-02-17 DIAGNOSIS — F33.0 MAJOR DEPRESSIVE DISORDER, RECURRENT, MILD: ICD-10-CM

## 2020-02-17 DIAGNOSIS — F10.20 ALCOHOL USE DISORDER, SEVERE, DEPENDENCE: Primary | ICD-10-CM

## 2020-02-17 LAB
AMPHET+METHAMPHET UR QL: NEGATIVE
BARBITURATES UR QL SCN>200 NG/ML: NEGATIVE
BENZODIAZ UR QL SCN>200 NG/ML: NEGATIVE
BREATH ALCOHOL: 0
BZE UR QL SCN: NEGATIVE
CANNABINOIDS UR QL SCN: NEGATIVE
CREAT UR-MCNC: 85 MG/DL (ref 15–325)
ETHANOL UR-MCNC: <10 MG/DL
METHADONE UR QL SCN>300 NG/ML: NEGATIVE
OPIATES UR QL SCN: NEGATIVE
PCP UR QL SCN>25 NG/ML: NEGATIVE
TOXICOLOGY INFORMATION: NORMAL

## 2020-02-17 PROCEDURE — 90853 PR GROUP PSYCHOTHERAPY: ICD-10-PCS | Mod: ,,, | Performed by: PSYCHOLOGIST

## 2020-02-17 PROCEDURE — 99232 SBSQ HOSP IP/OBS MODERATE 35: CPT | Mod: ,,, | Performed by: PSYCHIATRY & NEUROLOGY

## 2020-02-17 PROCEDURE — 90853 GROUP PSYCHOTHERAPY: CPT | Mod: ,,, | Performed by: PSYCHOLOGIST

## 2020-02-17 PROCEDURE — 99232 PR SUBSEQUENT HOSPITAL CARE,LEVL II: ICD-10-PCS | Mod: ,,, | Performed by: PSYCHIATRY & NEUROLOGY

## 2020-02-17 PROCEDURE — 90853 GROUP PSYCHOTHERAPY: CPT

## 2020-02-17 PROCEDURE — 80307 DRUG TEST PRSMV CHEM ANLYZR: CPT

## 2020-02-17 PROCEDURE — 90853 GROUP PSYCHOTHERAPY: CPT | Performed by: SOCIAL WORKER

## 2020-02-17 NOTE — PLAN OF CARE
02/17/20 1000   Activity/Group Therapy Checklist   Group Educational  (grief/loss   )   Attendance Attended   Follows Direction Followed directions   Group Interactions/Observations Interacted appropriately   Affect/Mood Range Normal range   Affect/Mood Display Appropriate   Goal Progression Progressing

## 2020-02-17 NOTE — PROGRESS NOTES
"2020   Name: Brooke Schwartz  : 1975  Start Date: 20     ABU Intensive Outpatient Program   Progress Note    Status: Intensive Outpatient Program (IOP)    SUBJECTIVE:     Interval Hx:  Pt seen and chart reviewed. Pt's affect seems a bit more reactive today. She states that she had a good weekend, was pretty "chill" and laid back but enjoyable. She denies having any cravings or triggers over the weekend. She reports that she still has some baseline anxiety but it has been improving. She has been trying not to worry or focus on her job issues, trying to be more "in the moment" and take things as they come. Using coping skills learned in the program effectively. Pt states that the decreased pleasure/enjoyment s/e of naltrexone is getting better. She reports good sleep and appetite. She denies any hopelessness or SI.     Medication Side Effects: denies today  Cravings/triggers: denies   No other acute psychiatric issues reported at this time     Scheduled Meds:   Current Outpatient Medications on File Prior to Encounter   Medication Sig Dispense Refill    diazePAM (VALIUM) 5 MG tablet 1 tablet (5 mg total) 3 (three) times daily for 2 days, THEN 1 tablet (5 mg total) 2 (two) times daily for 1 day, THEN 1 tablet (5 mg total) once daily for 1 day. 9 tablet 0    gabapentin (NEURONTIN) 300 MG capsule Take 1 capsule (300 mg total) by mouth 3 (three) times daily. 90 capsule 1    naltrexone (DEPADE) 50 mg tablet Take one tablet by mouth once daily 30 tablet 2    ranitidine (ZANTAC) 150 MG capsule Take 1 capsule (150 mg total) by mouth 2 (two) times daily. 60 capsule 2    rosuvastatin (CRESTOR) 10 MG tablet Take 10 mg by mouth once daily.      venlafaxine (EFFEXOR-XR) 75 MG 24 hr capsule Take 1 capsule (75 mg total) by mouth once daily. 30 capsule 0     No current facility-administered medications on file prior to encounter.      Allergies:  Patient has no known allergies.    Psychiatric Review Of Systems - " "Is patient experiencing or having changes in:  interest/pleasure/anhedonia: yes, diminished interest/pleasure in day to day activities  Sleep: improved with etoh abstinence  anxiety/panic: yes, +baseline anxiety that waxes/wanes, no panic recently  guilty/hopelessness: no  concentration: improving  S.I.B.s/risky behavior: no  Irritability: no  Racing thoughts: no  Impulsive behaviors: no  Paranoia: no  AVH: no    Medical ROS:  CONSTITUTIONAL: No weight gain or loss.   INTEGUMENTARY: No rashes or lacerations.  RESPIRATORY: No shortness of breath.  CARDIOVASCULAR: No tachycardia or chest pain.  GASTROINTESTINAL: No nausea, vomiting, diarrhea.    OBJECTIVE:     Vital Signs (Most Recent):  Vitals:    02/17/20 0900   BP: 95/65   Pulse: 84       Physical Exam:  Muscle strength and tone: wnl  Gait and station: wnl    Mental Status Exam:  Appearance: No apparent distress, age appropriate, appropriately dressed and groomed  Behavior: Friendly, cooperative  Orientation:  Grossly oriented to self, place, situation, time  Speech: normal volume and rate, decreased range of tone  Language:  Fluent english  Mood: "pretty good"  Affect: more reactive today, mood congruent  Thought Process: Linear and well-organized  Thought Content: No SI, HI, delusions, or paranoia  Perceptions:  Pt denies AVH and no objective s/s of AVH  Associations:  Logical and intact  Memory:  Recent and remote intact  Attention/Concentration:  Grossly intact  Fund of Knowledge:  Above average, Appropriate for level of education  Cognition: Intact, follows commands, appropriate contributions to interview  Insight: Improving  Judgment: Appropriate for setting  Impulse control: fair    Laboratory:  Recent Results (from the past 168 hour(s))   Toxicology screen, urine    Collection Time: 02/12/20 11:04 AM   Result Value Ref Range    Alcohol, Urine <10 <10 mg/dL    Benzodiazepines Presumptive Positive     Methadone metabolites Negative     Cocaine (Metab.) " Negative     Opiate Scrn, Ur Negative     Barbiturate Screen, Ur Negative     Amphetamine Screen, Ur Negative     THC Negative     Phencyclidine Negative     Creatinine, Random Ur 155.0 15.0 - 325.0 mg/dL    Toxicology Information SEE COMMENT    POCT BREATH ALCOHOL TEST    Collection Time: 02/12/20 11:04 AM   Result Value Ref Range    Breath Alcohol 0.00    POCT BREATH ALCOHOL TEST    Collection Time: 02/13/20  4:00 PM   Result Value Ref Range    Breath Alcohol 0.00    Toxicology screen, urine    Collection Time: 02/14/20 10:29 AM   Result Value Ref Range    Alcohol, Urine <10 <10 mg/dL    Benzodiazepines Presumptive Positive     Methadone metabolites Negative     Cocaine (Metab.) Negative     Opiate Scrn, Ur Negative     Barbiturate Screen, Ur Negative     Amphetamine Screen, Ur Negative     THC Negative     Phencyclidine Negative     Creatinine, Random Ur 125.0 15.0 - 325.0 mg/dL    Toxicology Information SEE COMMENT    POCT BREATH ALCOHOL TEST    Collection Time: 02/14/20 10:29 AM   Result Value Ref Range    Breath Alcohol 0.00    POCT BREATH ALCOHOL TEST    Collection Time: 02/17/20 10:40 AM   Result Value Ref Range    Breath Alcohol 0.00      ASSESSMENT:     Alcohol Use Disorder, Severe  Benzodiazepine Use, R/o Disorder  Hx MDD  SIMD  R/o Adjustment Disorder w/mixed features  R/o Other unspecified anxiety disorder    PLAN:     · Continue patient on ABU protocol.  · Breathalyzer and urine toxicology daily - quantitative BZD consistent with abstinence  · Patient counseled on abstinence from alcohol, Xanax, and illicit drugs.  · Daily AA or other 12-step meetings  · Relapse prevention and motivational interviewing provided    Medications: The risks and benefits of medication were discussed with the patient.   · Increase Effexor XR to 225mg PO daily for mood and anxiety  · Cont gabapentin 300mg TID  · Cont Naltrexone 50mg qd (CMP reviewed) - pt wishes to continue taking despite mild apathy noted in  HPI  · Counseling provided on compliance w/above regimen    Status: Continue treatment on ABU     Patient's Intervention Response: accepting    Case discussed with: MD Dillon Rawls MD  [facility] Psychiatry PGY-4  02/17/2020 11:31 AM

## 2020-02-17 NOTE — PROGRESS NOTES
Group Psychotherapy (PhD/LCSW)    Site: [facility]    Clinical status of patient: Intensive Outpatient Program (IOP)    Date: 2/17/2020    Group Focus: Psychodynamic Group Psychotherapy    Length of service: 55866 - 45-50 minutes    Number of patients in attendance: 6    Referred by: Addictive Behavior Unit Treatment Team    Target symptoms: Alcohol Abuse    Patient's response to treatment: Active Listening    Progress toward goals: Progressing adequately    Interval History: Pt reports she had a good weekend, mostly staying at home with her . She initiated a successful conversation with him about her request to not ask her about work and feels good about this.    Diagnosis: Alcohol Use Disorder    Plan: Continue treatment on ABU

## 2020-02-18 ENCOUNTER — DOCUMENTATION ONLY (OUTPATIENT)
Dept: PSYCHIATRY | Facility: HOSPITAL | Age: 45
End: 2020-02-18

## 2020-02-18 LAB — ETHYL GLUCURONIDE: NEGATIVE NG/ML

## 2020-02-18 NOTE — PSYCH
"SW contacted pt spouse regarding pt absence. Spouse returned call approx 15 min later and reported that pt is home and safe. Spouse reported that he recommended pt call program regarding absence however was unaware whether pt had made call or not.     Spouse provided pt with phone where pt reported sxs of dizziness. Pt reported being safe at home. Pt reported that she has plans to make appt with PCP. SW inquired about pt presenting sxs where pt responded, " I need to lay down". Pt ended call. SW attempted to return call. No answer.   "

## 2020-02-18 NOTE — PSYCH
Sw contacted pt regarding absence from IOP. No answer. Sw left message to return call. Call made at 10:13am

## 2020-02-19 ENCOUNTER — HOSPITAL ENCOUNTER (OUTPATIENT)
Dept: PSYCHIATRY | Facility: HOSPITAL | Age: 45
Discharge: HOME | End: 2020-02-19
Attending: PSYCHIATRY & NEUROLOGY
Payer: COMMERCIAL

## 2020-02-19 VITALS — HEART RATE: 89 BPM | DIASTOLIC BLOOD PRESSURE: 64 MMHG | SYSTOLIC BLOOD PRESSURE: 110 MMHG

## 2020-02-19 DIAGNOSIS — F41.1 GAD (GENERALIZED ANXIETY DISORDER): Chronic | ICD-10-CM

## 2020-02-19 DIAGNOSIS — F33.0 MAJOR DEPRESSIVE DISORDER, RECURRENT, MILD: ICD-10-CM

## 2020-02-19 DIAGNOSIS — F10.20 ALCOHOL USE DISORDER, SEVERE, DEPENDENCE: Primary | ICD-10-CM

## 2020-02-19 DIAGNOSIS — F13.90 SEDATIVE, HYPNOTIC OR ANXIOLYTIC USE DISORDER, MODERATE, IN CONTROLLED ENVIRONMENT: ICD-10-CM

## 2020-02-19 PROCEDURE — 90853 PR GROUP PSYCHOTHERAPY: ICD-10-PCS | Mod: ,,, | Performed by: PSYCHOLOGIST

## 2020-02-19 PROCEDURE — 99232 PR SUBSEQUENT HOSPITAL CARE,LEVL II: ICD-10-PCS | Mod: ,,, | Performed by: PSYCHIATRY & NEUROLOGY

## 2020-02-19 PROCEDURE — 90853 GROUP PSYCHOTHERAPY: CPT | Mod: ,,, | Performed by: PSYCHOLOGIST

## 2020-02-19 PROCEDURE — 90853 GROUP PSYCHOTHERAPY: CPT | Performed by: SOCIAL WORKER

## 2020-02-19 PROCEDURE — 99232 SBSQ HOSP IP/OBS MODERATE 35: CPT | Mod: ,,, | Performed by: PSYCHIATRY & NEUROLOGY

## 2020-02-19 PROCEDURE — 80307 DRUG TEST PRSMV CHEM ANLYZR: CPT

## 2020-02-19 PROCEDURE — 90853 GROUP PSYCHOTHERAPY: CPT

## 2020-02-19 NOTE — PLAN OF CARE
02/19/20 1400   Activity/Group Therapy Checklist   Group Educational   Attendance Attended   Follows Direction Followed directions   Group Interactions/Observations Interacted appropriately   Affect/Mood Range Normal range   Affect/Mood Display Appropriate   Goal Progression Progressing

## 2020-02-19 NOTE — PROGRESS NOTES
Group Psychotherapy (PhD/LCSW)    Site: [facility]    Clinical status of patient: Intensive Outpatient Program (IOP)    Date: 2/19/2020    Group Focus: CBT Group Psychotherapy    Length of service: 72062 - 45-50 minutes    Number of patients in attendance: 10    Referred by: Addictive Behavior Unit Treatment Team    Target symptoms: Alcohol Abuse    Patient's response to treatment: Active Listening; Self-Disclosure    Progress toward goals: Progressing adequately    Interval History: Session focus was Cognitive Restructuring (Part 2):  Identifying unhelpful and helpful thoughts.  Patients were provided with a worksheet on unhelpful thinking styles and how to identify helpful thoughts.  Patients helped others with personal examples of unhelpful and helpful thoughts.      Diagnosis: Alcohol Use Disorder    Plan: Continue treatment on ABU

## 2020-02-19 NOTE — PROGRESS NOTES
Group Psychotherapy (PhD/LCSW)    Site: [facility]    Clinical status of patient: Intensive Outpatient Program (IOP)    Date: 2/19/2020    Group Focus: Psychodynamic Group Psychotherapy    Length of service: 29778 - 45-50 minutes    Number of patients in attendance: 7    Referred by: Addictive Behavior Unit Treatment Team    Target symptoms: Alcohol Abuse    Patient's response to treatment: Active Listening    Progress toward goals: Progressing adequately    Interval History: Pt reports she is feeling better after missing yesterday due to kidney infection. Pt expressed frustration with her  who again brought up her return to work plan after she asked him not to. Group gave feedback about how she can address this with him again.    Diagnosis: Alcohol Use Disorder    Plan: Continue treatment on ABU

## 2020-02-19 NOTE — PATIENT CARE CONFERENCE
ABU Staffing:    Alcohol Use Disorder, Severe  Benzodiazepine Use, R/o Disorder  Hx MDD  SIMD  R/o Adjustment Disorder w/mixed features  R/o Other unspecified anxiety disorder     1. Pt is attending all groups    2. Pt is attending all meetings  3. Pt 's has minimally supportive family  4. Pt has completed spiritual assessment    5. Pt will present life story    6. Pt will present Step One assignment    7. Pt is exploring issues related to relapse  prevention; spirituality; stress management; improved communication skills; assertiveness training; poor self-esteem; disease concepts; cross addictions; and, work related issues    8. D/C date: TBD         Staffing discussed that pt was a no/show-no/call to ABU yesterday. When staff contacted pt by phone,  answered and said she was ill. Pt then spoke with staff and reported symptoms of dizziness, then hung up. Staff discussed ongoing symptoms of depression, flat and near-catatonic affect at times, however pt reports sx of anxiety and no sx depression or sadness. Staff discussed placing pt on probation d/t no-call/no-show, possibility of pt having relapsed over weekend and options for referring pt to higher level of care including [place], pt would need dual-diagnosis-oriented treatment to address depression. Staff discussed psychosocial conditions including  substance misuse and  lack of emotional support. Staff discussed talking to pt about Antabuse.       Problem:  Alcohol Use Disorder, severe   Goal: Address in 12 step meetings and group and individual sessions    Objective Measure: participation in groups, self report, length of sobriety, and relapse prevention plan  Time: Prior to discharge    Progress: Pt is attending groups and sessions     Problem:Benzodiazepine Use, R/o Disorder  Goal: Address in 12 step meetings and group and individual sessions    Objective Measure: participation in groups, self report, length of sobriety, and relapse  prevention plan  Time: Prior to discharge    Progress: Pt is attending groups and sessions        Problem:  Hx of MDD  Goal: Address in 12 step meetings and group and individual sessions    Objective Measure: participation in groups, self report, length of sobriety, and relapse prevention plan  Time: Prior to discharge    Progress: Pt is attending groups and sessions    Problem:  SIMD  Goal: Address in 12 step meetings and group and individual sessions    Objective Measure: participation in groups, self report, length of sobriety, and relapse prevention plan  Time: Prior to discharge    Progress: Pt is attending groups and sessions    Problem: R/o Adjustment Disorder w/mixed features  Goal: Address in 12 step meetings and group and individual sessions    Objective Measure: participation in groups, self report, length of sobriety, and relapse prevention plan  Time: Prior to discharge    Progress: Pt is attending groups and sessions    Problem:  R/o Other unspecified anxiety disorder  Goal: Address in 12 step meetings and group and individual sessions    Objective Measure: participation in groups, self report, length of sobriety, and relapse prevention plan  Time: Prior to discharge    Progress: Pt is attending groups and sessions    Staff members present:    MD Dr. Michael Miranda MD Resident  Dr. Willams, PhD  Vinay Reyez, [facility]  Margie John, [facility]

## 2020-02-19 NOTE — PROGRESS NOTES
Group Psychotherapy (PhD/LCSW)    Site: [facility]    Clinical status of patient: Intensive Outpatient Program (IOP)    Date: 2/19/2020    Group Focus: The Dynamics of Relationship       Length of service: 36716 - 45-50 minutes    Number of patients in attendance: 11    Referred by: Addictive Behavior Unit Treatment Team    Target symptoms: Alcohol Abuse    Patient's response to treatment: Active Listening    Progress toward goals: Progressing adequately    Interval History:  Discussed strategies for overcoming trust issues. Noted the importance of empathy. Gave examples of how to use reflective listening and I-messages to enhance communication around trust issues   .    Diagnosis: Alcohol Use Disorder    Plan: Continue treatment on ABU

## 2020-02-19 NOTE — PROGRESS NOTES
"2020   Name: Brooke Schwartz  : 1975  Start Date: 20     ABU Intensive Outpatient Program   Progress Note    Status: Intensive Outpatient Program (IOP)    SUBJECTIVE:     Interval Hx:  Pt seen and chart reviewed. Pt missed yesterday and SW had some difficulty getting in touch with her and had a brief call that ended abruptly (see SW note). Pt today reports that she was having sx of orthostatic dizziness, did not feel well, ended up going to PCP, was dx with UTI and dehydration, was given 2L IVF and placed on antibiotics. Pt states that she was having dysuria which led to decreased fluid intake, which then led to the dehydration. Pt denies relapse yesterday. Attempted to elicit more information about her mood given team's concern for worsening depression - pt states that she subjectively does not feel depressed. She reports significant improvement in her mood since admission, and no longer feels "down and out" like she did before. Pt states that she has been depressed on and off for much of her life, so she knows what it would feel like. Pt initially states that things are fine at home, but later reveals that her  has been pressing her about returning to work and they are not in a good place right now. She subjectively feels "much better" this morning physically. She does not voice any significant s/e or AE of current meds. Pt denies any cravings/urges to drink.     Medication Side Effects: denies today  Cravings/triggers: denies   No other acute psychiatric issues reported at this time     Scheduled Meds:   Current Outpatient Medications on File Prior to Encounter   Medication Sig Dispense Refill    diazePAM (VALIUM) 5 MG tablet 1 tablet (5 mg total) 3 (three) times daily for 2 days, THEN 1 tablet (5 mg total) 2 (two) times daily for 1 day, THEN 1 tablet (5 mg total) once daily for 1 day. 9 tablet 0    gabapentin (NEURONTIN) 300 MG capsule Take 1 capsule (300 mg total) by mouth 3 (three) times " daily. 90 capsule 1    naltrexone (DEPADE) 50 mg tablet Take one tablet by mouth once daily 30 tablet 2    ranitidine (ZANTAC) 150 MG capsule Take 1 capsule (150 mg total) by mouth 2 (two) times daily. 60 capsule 2    rosuvastatin (CRESTOR) 10 MG tablet Take 10 mg by mouth once daily.      venlafaxine (EFFEXOR-XR) 75 MG 24 hr capsule Take 1 capsule (75 mg total) by mouth once daily. 30 capsule 0     No current facility-administered medications on file prior to encounter.      Allergies:  Patient has no known allergies.    Medical ROS:  CONSTITUTIONAL: +mild generalized fatigue   INTEGUMENTARY: No rashes or lacerations.  RESPIRATORY: No shortness of breath.  CARDIOVASCULAR: No tachycardia or chest pain.  GASTROINTESTINAL: No nausea, vomiting, pain, constipation or diarrhea.  GENITOURINARY: +dysuria    OBJECTIVE:     Vital Signs (Most Recent):  Vitals:    02/19/20 0900   BP: 110/64   Pulse: 89       Physical Exam:  Muscle strength and tone: wnl  Gait and station: wnl    Mental Status Exam:  Appearance: No apparent distress, age appropriate, appropriately dressed and groomed  Behavior: Cooperative, seems somewhat withdrawn  Orientation:  Grossly oriented to self, place, situation, time  Speech: normal volume and rate, decreased range of tone  Language:  Fluent english  Mood: reports fair mood, denies depression  Affect: constricted, decreased range, able to smile  Thought Process: Linear and well-organized  Thought Content: No SI, HI, delusions, or paranoia  Perceptions:  Pt denies AVH and no objective s/s of AVH  Associations:  Logical and intact  Memory:  Recent and remote intact  Attention/Concentration:  Grossly intact  Fund of Knowledge:  Above average, Appropriate for level of education  Cognition: Intact, follows commands, appropriate contributions to interview  Insight: Improving  Judgment: Appropriate for setting  Impulse control: fair    Laboratory:  Recent Results (from the past 168 hour(s))   Toxicology  screen, urine    Collection Time: 02/12/20 11:04 AM   Result Value Ref Range    Alcohol, Urine <10 <10 mg/dL    Benzodiazepines Presumptive Positive     Methadone metabolites Negative     Cocaine (Metab.) Negative     Opiate Scrn, Ur Negative     Barbiturate Screen, Ur Negative     Amphetamine Screen, Ur Negative     THC Negative     Phencyclidine Negative     Creatinine, Random Ur 155.0 15.0 - 325.0 mg/dL    Toxicology Information SEE COMMENT    POCT BREATH ALCOHOL TEST    Collection Time: 02/12/20 11:04 AM   Result Value Ref Range    Breath Alcohol 0.00    POCT BREATH ALCOHOL TEST    Collection Time: 02/13/20  4:00 PM   Result Value Ref Range    Breath Alcohol 0.00    Toxicology screen, urine    Collection Time: 02/14/20 10:29 AM   Result Value Ref Range    Alcohol, Urine <10 <10 mg/dL    Benzodiazepines Presumptive Positive     Methadone metabolites Negative     Cocaine (Metab.) Negative     Opiate Scrn, Ur Negative     Barbiturate Screen, Ur Negative     Amphetamine Screen, Ur Negative     THC Negative     Phencyclidine Negative     Creatinine, Random Ur 125.0 15.0 - 325.0 mg/dL    Toxicology Information SEE COMMENT    POCT BREATH ALCOHOL TEST    Collection Time: 02/14/20 10:29 AM   Result Value Ref Range    Breath Alcohol 0.00    POCT BREATH ALCOHOL TEST    Collection Time: 02/17/20 10:40 AM   Result Value Ref Range    Breath Alcohol 0.00    Toxicology screen, urine    Collection Time: 02/17/20  1:10 PM   Result Value Ref Range    Alcohol, Urine <10 <10 mg/dL    Benzodiazepines Negative     Methadone metabolites Negative     Cocaine (Metab.) Negative     Opiate Scrn, Ur Negative     Barbiturate Screen, Ur Negative     Amphetamine Screen, Ur Negative     THC Negative     Phencyclidine Negative     Creatinine, Random Ur 85.0 15.0 - 325.0 mg/dL    Toxicology Information SEE COMMENT    Alcohol Biomarkers, urine    Collection Time: 02/17/20  1:14 PM   Result Value Ref Range    Ethyl Glucuronide Negative Cutoff: 500  ng/mL     ASSESSMENT:     Alcohol Use Disorder, Severe  Benzodiazepine Use, R/o Disorder  Hx MDD  SIMD  R/o Adjustment Disorder w/mixed features  R/o Other unspecified anxiety disorder    PLAN:     · Continue patient on ABU protocol.  · Breathalyzer and urine toxicology daily - quantitative BZD consistent with abstinence  · Patient counseled on abstinence from alcohol, Xanax, and illicit drugs.  · Daily AA or other 12-step meetings  · Relapse prevention and motivational interviewing provided    Medications: The risks and benefits of medication were discussed with the patient.   · Continue Effexor XR 225mg PO daily for mood and anxiety  · Cont gabapentin 300mg TID   · Cont Naltrexone 50mg qd (CMP reviewed)  · Counseling provided on compliance w/above regimen    Status: Continue treatment on ABU     Patient's Intervention Response: accepting    Case discussed with: MD Dillon Rawls MD  [facility] Psychiat[facility] PGY-4  02/19/2020 11:08 AM

## 2020-02-20 ENCOUNTER — HOSPITAL ENCOUNTER (OUTPATIENT)
Dept: PSYCHIATRY | Facility: HOSPITAL | Age: 45
Discharge: HOME | End: 2020-02-20
Attending: PSYCHIATRY & NEUROLOGY
Payer: COMMERCIAL

## 2020-02-20 DIAGNOSIS — F33.0 MAJOR DEPRESSIVE DISORDER, RECURRENT, MILD: ICD-10-CM

## 2020-02-20 DIAGNOSIS — F10.20 ALCOHOL USE DISORDER, SEVERE, DEPENDENCE: Primary | ICD-10-CM

## 2020-02-20 LAB — BREATH ALCOHOL: 0

## 2020-02-20 PROCEDURE — 90853 GROUP PSYCHOTHERAPY: CPT | Performed by: SOCIAL WORKER

## 2020-02-20 PROCEDURE — 90853 GROUP PSYCHOTHERAPY: CPT | Mod: ,,, | Performed by: PSYCHOLOGIST

## 2020-02-20 PROCEDURE — 90853 PR GROUP PSYCHOTHERAPY: ICD-10-PCS | Mod: ,,, | Performed by: PSYCHOLOGIST

## 2020-02-20 PROCEDURE — 90853 GROUP PSYCHOTHERAPY: CPT

## 2020-02-20 NOTE — PLAN OF CARE
02/20/20 1400   Activity/Group Therapy Checklist   Group Goals/Reflection   Attendance Attended   Follows Direction Followed directions   Group Interactions/Observations Interacted appropriately   Affect/Mood Range Normal range   Affect/Mood Display Appropriate   Goal Progression Progressing

## 2020-02-20 NOTE — PLAN OF CARE
02/20/20 1300   Activity/Group Therapy Checklist   Group Cognitive Therapy  (Pt discussed stressors with  bringing up work and setting boundaries, also discussed AA meetings and the enjoyment of cooking dinner)   Attendance Attended   Follows Direction Followed directions   Group Interactions/Observations Interacted appropriately   Affect/Mood Range Normal range   Affect/Mood Display Appropriate   Goal Progression Progressing

## 2020-02-21 ENCOUNTER — HOSPITAL ENCOUNTER (OUTPATIENT)
Dept: PSYCHIATRY | Facility: HOSPITAL | Age: 45
Discharge: HOME | End: 2020-02-21
Attending: PSYCHIATRY & NEUROLOGY
Payer: COMMERCIAL

## 2020-02-21 VITALS — DIASTOLIC BLOOD PRESSURE: 56 MMHG | HEART RATE: 77 BPM | SYSTOLIC BLOOD PRESSURE: 91 MMHG

## 2020-02-21 DIAGNOSIS — F13.90 SEDATIVE, HYPNOTIC OR ANXIOLYTIC USE DISORDER, MODERATE, IN CONTROLLED ENVIRONMENT: ICD-10-CM

## 2020-02-21 DIAGNOSIS — F41.1 GAD (GENERALIZED ANXIETY DISORDER): Chronic | ICD-10-CM

## 2020-02-21 DIAGNOSIS — F33.0 MAJOR DEPRESSIVE DISORDER, RECURRENT, MILD: Chronic | ICD-10-CM

## 2020-02-21 DIAGNOSIS — F10.930 ALCOHOL WITHDRAWAL, UNCOMPLICATED: ICD-10-CM

## 2020-02-21 DIAGNOSIS — F10.20 ALCOHOL USE DISORDER, SEVERE, DEPENDENCE: Primary | ICD-10-CM

## 2020-02-21 LAB — ETHYL GLUCURONIDE: NEGATIVE NG/ML

## 2020-02-21 PROCEDURE — 90853 GROUP PSYCHOTHERAPY: CPT | Performed by: SOCIAL WORKER

## 2020-02-21 PROCEDURE — 90853 GROUP PSYCHOTHERAPY: CPT | Mod: 59,,, | Performed by: PSYCHOLOGIST

## 2020-02-21 PROCEDURE — 99232 PR SUBSEQUENT HOSPITAL CARE,LEVL II: ICD-10-PCS | Mod: ,,, | Performed by: PSYCHIATRY & NEUROLOGY

## 2020-02-21 PROCEDURE — 80307 DRUG TEST PRSMV CHEM ANLYZR: CPT

## 2020-02-21 PROCEDURE — 90853 PR GROUP PSYCHOTHERAPY: ICD-10-PCS | Mod: 59,,, | Performed by: PSYCHOLOGIST

## 2020-02-21 PROCEDURE — 99232 SBSQ HOSP IP/OBS MODERATE 35: CPT | Mod: ,,, | Performed by: PSYCHIATRY & NEUROLOGY

## 2020-02-21 NOTE — PROGRESS NOTES
Group Psychotherapy (PhD/LCSW)    Site: [facility]    Clinical status of patient: Intensive Outpatient Program (IOP)    Date: 2/20/2020    Group Focus: Conflict Resolution    Length of service: 12992 - 45-50 minutes    Number of patients in attendance: 9    Referred by: Addictive Behavior Unit Treatment Team    Target symptoms: Alcohol Abuse    Patient's response to treatment: Active Listening; Self-Disclosure    Progress toward goals: Progressing adequately    Interval History: Session focus was conflict resolution.    Diagnosis: Alcohol Use Disorder    Plan: Continue treatment on ABU

## 2020-02-21 NOTE — PROGRESS NOTES
Group Psychotherapy (PhD/LCSW)    Site: [facility]    Clinical status of patient: Intensive Outpatient Program (IOP)    Date: 2/20/2020    Group Focus: Stress Management      Length of service: 77576 - 45-50 minutes    Number of patients in attendance: 10    Referred by: Addictive Behavior Unit Treatment Team    Target symptoms: Alcohol Abuse    Patient's response to treatment: Active Listening    Progress toward goals: Progressing adequately    Interval History: Discussed strategies for identifying maladaptive beliefs that intensify stress and changing one's self-talk to mitigate stress..    Diagnosis: Alcohol Use Disorder    Plan: Continue treatment on ABU

## 2020-02-21 NOTE — PROGRESS NOTES
Group Psychotherapy (PhD/LCSW)    Site: [facility]    Clinical status of patient: Intensive Outpatient Program (IOP)    Date: 2/21/2020    Group Focus: Stress Management    Length of service: 54906 - 45-50 minutes    Number of patients in attendance: 10    Referred by: Addictive Behavior Unit Treatment Team    Target symptoms: Alcohol Abuse    Patient's response to treatment: Active Listening    Progress toward goals: Progressing adequately    Interval History: Group learned and practiced mindfulness exercises (sensory meditation) to improve present-moment awareness, impulsive behavior tendencies, and tolerance of various emotional states.     Diagnosis: Alcohol Use Disorder    Plan: Continue treatment on ABU

## 2020-02-21 NOTE — PROGRESS NOTES
Group Psychotherapy (PhD/LCSW)    Site: [facility]    Clinical status of patient: Intensive Outpatient Program (IOP)    Date: 2/21/2020    Group Focus: Psychodynamic Group Psychotherapy    Length of service: 61927 - 45-50 minutes    Number of patients in attendance: 7    Referred by: Addictive Behavior Unit Treatment Team    Target symptoms: Alcohol Abuse    Patient's response to treatment: Active Listening    Progress toward goals: Progressing adequately    Interval History: Pt was more engaged in group today. She engaged in a weekend planning discussion and gave good feedback to another group member.    Diagnosis: Alcohol Use Disorder    Plan: Continue treatment on ABU

## 2020-02-21 NOTE — PLAN OF CARE
02/21/20 1400   Activity/Group Therapy Checklist   Group Relapse Prevention  (Step Work )   Attendance Attended   Follows Direction Followed directions   Group Interactions/Observations Interacted appropriately   Affect/Mood Range Normal range   Affect/Mood Display Appropriate   Goal Progression Progressing

## 2020-02-21 NOTE — PROGRESS NOTES
2020   Name: Brooke Schwartz  : 1975  Start Date: 20     ABU Intensive Outpatient Program   Progress Note    Status: Intensive Outpatient Program (IOP)    SUBJECTIVE:     Interval Hx:  Pt seen and chart reviewed. Pt reports that she is doing well this morning. Her affect seems a bit brighter/more reactive today, she smiles several times during interview. Pt reports that most things unchanged since last time we met. Her urinary symptoms are improving, she has been trying to stay hydrated. She has been working through some issues with her , states that they are in a better place now and he is being less intrusive/demanding about when she is returning to work. She again denies feeling depressed or hopeless. Anxiety has continued to improve. Tolerating effexor without any reported s/e or AE. Feels fairly well physically. Sleeping well, appetite wnl, denies cravings.     Medication Side Effects: denies today  Cravings/triggers: denies   No other acute psychiatric issues reported at this time     Scheduled Meds:   Current Outpatient Medications on File Prior to Encounter   Medication Sig Dispense Refill    diazePAM (VALIUM) 5 MG tablet 1 tablet (5 mg total) 3 (three) times daily for 2 days, THEN 1 tablet (5 mg total) 2 (two) times daily for 1 day, THEN 1 tablet (5 mg total) once daily for 1 day. 9 tablet 0    gabapentin (NEURONTIN) 300 MG capsule Take 1 capsule (300 mg total) by mouth 3 (three) times daily. 90 capsule 1    naltrexone (DEPADE) 50 mg tablet Take one tablet by mouth once daily 30 tablet 2    ranitidine (ZANTAC) 150 MG capsule Take 1 capsule (150 mg total) by mouth 2 (two) times daily. 60 capsule 2    rosuvastatin (CRESTOR) 10 MG tablet Take 10 mg by mouth once daily.      venlafaxine (EFFEXOR-XR) 75 MG 24 hr capsule Take 1 capsule (75 mg total) by mouth once daily. 30 capsule 0     No current facility-administered medications on file prior to encounter.      Allergies:  Patient has  no known allergies.    Medical ROS:  CONSTITUTIONAL: No weight gain or loss.   INTEGUMENTARY: No rashes or lacerations.  RESPIRATORY: No shortness of breath.  CARDIOVASCULAR: No tachycardia or chest pain.  GASTROINTESTINAL: No nausea, vomiting, pain, constipation or diarrhea.  GENITOURINARY: dysuria improving    OBJECTIVE:     Vital Signs (Most Recent):  There were no vitals filed for this visit.    Physical Exam:  Muscle strength and tone: wnl  Gait and station: wnl    Mental Status Exam:  Appearance: No apparent distress, age appropriate, appropriately dressed and groomed  Behavior: Calm and cooperative   Orientation:  Grossly oriented to self, place, situation, time  Speech: normal volume and rate, decreased range of tone  Language:  Fluent english  Mood: reports fair mood, denies depression  Affect: constricted but a bit brighter today, more reactive  Thought Process: Linear and well-organized  Thought Content: No SI, HI, delusions, or paranoia  Perceptions:  Pt denies AVH and no objective s/s of AVH  Associations:  Logical and intact  Memory:  Recent and remote intact  Attention/Concentration:  Grossly intact  Fund of Knowledge:  Above average, Appropriate for level of education  Cognition: Intact, follows commands, appropriate contributions to interview  Insight: Improving  Judgment: Appropriate for setting  Impulse control: fair    Laboratory:  Recent Results (from the past 168 hour(s))   Toxicology screen, urine    Collection Time: 02/14/20 10:29 AM   Result Value Ref Range    Alcohol, Urine <10 <10 mg/dL    Benzodiazepines Presumptive Positive     Methadone metabolites Negative     Cocaine (Metab.) Negative     Opiate Scrn, Ur Negative     Barbiturate Screen, Ur Negative     Amphetamine Screen, Ur Negative     THC Negative     Phencyclidine Negative     Creatinine, Random Ur 125.0 15.0 - 325.0 mg/dL    Toxicology Information SEE COMMENT    POCT BREATH ALCOHOL TEST    Collection Time: 02/14/20 10:29 AM    Result Value Ref Range    Breath Alcohol 0.00    POCT BREATH ALCOHOL TEST    Collection Time: 02/17/20 10:40 AM   Result Value Ref Range    Breath Alcohol 0.00    Toxicology screen, urine    Collection Time: 02/17/20  1:10 PM   Result Value Ref Range    Alcohol, Urine <10 <10 mg/dL    Benzodiazepines Negative     Methadone metabolites Negative     Cocaine (Metab.) Negative     Opiate Scrn, Ur Negative     Barbiturate Screen, Ur Negative     Amphetamine Screen, Ur Negative     THC Negative     Phencyclidine Negative     Creatinine, Random Ur 85.0 15.0 - 325.0 mg/dL    Toxicology Information SEE COMMENT    Alcohol Biomarkers, urine    Collection Time: 02/17/20  1:14 PM   Result Value Ref Range    Ethyl Glucuronide Negative Cutoff: 500 ng/mL   POCT BREATH ALCOHOL TEST    Collection Time: 02/20/20  7:51 PM   Result Value Ref Range    Breath Alcohol 0.00      ASSESSMENT:     · Alcohol Use Disorder, Severe  · Benzodiazepine Use, R/o Disorder  · Hx MDD  · R/o Adjustment Disorder w/mixed features  · R/o Other unspecified anxiety disorder    PLAN:     · Continue patient on ABU protocol.  · Breathalyzer and urine toxicology daily - quantitative BZD consistent with abstinence  · Patient counseled on abstinence from alcohol, Xanax, and illicit drugs.  · Daily AA or other 12-step meetings  · Relapse prevention and motivational interviewing provided    Medications: The risks and benefits of medication were discussed with the patient.   · Continue Effexor XR 225mg PO daily for mood and anxiety  · Cont gabapentin 300mg TID   · Cont Naltrexone 50mg qd (CMP reviewed)  · Counseling provided on compliance w/above regimen    Status: Continue treatment on ABU     Patient's Intervention Response: accepting    Case discussed with: MD Dillon Rawls MD  [facility] PGY-4  02/21/2020 10:00 AM

## 2020-02-24 ENCOUNTER — DOCUMENTATION ONLY (OUTPATIENT)
Dept: PSYCHIATRY | Facility: HOSPITAL | Age: 45
End: 2020-02-24

## 2020-02-24 LAB
AMPHET+METHAMPHET UR QL: NEGATIVE
BARBITURATES UR QL SCN>200 NG/ML: NEGATIVE
BENZODIAZ UR QL SCN>200 NG/ML: NEGATIVE
BZE UR QL SCN: NEGATIVE
CANNABINOIDS UR QL SCN: NEGATIVE
CREAT UR-MCNC: 110.2 MG/DL (ref 15–325)
ETHANOL UR-MCNC: <10 MG/DL
METHADONE UR QL SCN>300 NG/ML: NEGATIVE
OPIATES UR QL SCN: NEGATIVE
PCP UR QL SCN>25 NG/ML: NEGATIVE
TOXICOLOGY INFORMATION: NORMAL

## 2020-02-24 NOTE — PROGRESS NOTES
"Sw received a call from pt's  regarding pt attending program today 2/24/2020. Pt's  informed this "sw that pt refuses to call and reports that she is not coming in today and to blame it on work stuff". Sw requested to speak with pt directly and pt refused. Pt's  reported "I don't know what to do with her. She isn't drinking but she doesn't want to go anywhere or leave the couch" Geni to inform tx team.   "

## 2020-02-26 ENCOUNTER — DOCUMENTATION ONLY (OUTPATIENT)
Dept: PSYCHIATRY | Facility: HOSPITAL | Age: 45
End: 2020-02-26

## 2020-02-26 ENCOUNTER — HOSPITAL ENCOUNTER (OUTPATIENT)
Dept: PSYCHIATRY | Facility: HOSPITAL | Age: 45
Discharge: HOME | End: 2020-02-26
Attending: PSYCHIATRY & NEUROLOGY
Payer: COMMERCIAL

## 2020-02-26 NOTE — PROGRESS NOTES
Wendy placed a call to pt to inquire about attending ABU program. No answer wendy unable to leave a vm due to mailbox being full.

## 2020-02-26 NOTE — PROGRESS NOTES
Geni placed a call to pt to follow up on returning to program. No answer sw unable to leave a vm due to mailbox being full.    Geni placed a call to pt's  Turner to follow up. Turner informed this sw that he spoke with pt while pt was in the background and pt reports she is not returning to tx. Sw requested to speak with pt and pt refused. Geni voiced verbal understanding and informed tx team.

## 2020-02-26 NOTE — PATIENT CARE CONFERENCE
ABU Staffing:    Alcohol Use Disorder, Severe  Benzodiazepine Use, R/o Disorder  Hx MDD  SIMD  R/o Adjustment Disorder w/mixed features  R/o Other unspecified anxiety disorder     1. Pt is attending all groups    2. Pt is attending all meetings  3. Pt 's has minimally supportive family  4. Pt has completed spiritual assessment    5. Pt will present life story    6. Pt will present Step One assignment    7. Pt is exploring issues related to relapse  prevention; spirituality; stress management; improved communication skills; assertiveness training; poor self-esteem; disease concepts; cross addictions; and, work related issues    8. D/C date: TBD        Staff discussed pt noncompliance with program, pt was a no-show on Monday and did not answer phone and pt's  answered phone but pt refused to speak with worker. Per  pt had not relapsed. Pt now on probation. Staff discussed referring pt out to [facility], pt is not processing in this program, pt is not processing how marital relationship may be a factor in substance use. Pt at risk of leaving program although pt has also expressed consistent desire to engage in aftercare.       Problem:  Alcohol Use Disorder, severe   Goal: Address in 12 step meetings and group and individual sessions    Objective Measure: participation in groups, self report, length of sobriety, and relapse prevention plan  Time: Prior to discharge    Progress: Pt is attending groups and sessions     Problem:Benzodiazepine Use, R/o Disorder  Goal: Address in 12 step meetings and group and individual sessions    Objective Measure: participation in groups, self report, length of sobriety, and relapse prevention plan  Time: Prior to discharge    Progress: Pt is attending groups and sessions        Problem:  Hx of MDD  Goal: Address in 12 step meetings and group and individual sessions    Objective Measure: participation in groups, self report, length of sobriety, and relapse prevention  plan  Time: Prior to discharge    Progress: Pt is attending groups and sessions    Problem:  SIMD  Goal: Address in 12 step meetings and group and individual sessions    Objective Measure: participation in groups, self report, length of sobriety, and relapse prevention plan  Time: Prior to discharge    Progress: Pt is attending groups and sessions    Problem: R/o Adjustment Disorder w/mixed features  Goal: Address in 12 step meetings and group and individual sessions    Objective Measure: participation in groups, self report, length of sobriety, and relapse prevention plan  Time: Prior to discharge    Progress: Pt is attending groups and sessions    Problem:  R/o Other unspecified anxiety disorder  Goal: Address in 12 step meetings and group and individual sessions    Objective Measure: participation in groups, self report, length of sobriety, and relapse prevention plan  Time: Prior to discharge    Progress: Pt is attending groups and sessions    Staff members present:    MD Dr. Jesse Miranda MD Resident  Dr. Michael MD Resident  Margie John, [facility]

## 2020-04-10 ENCOUNTER — HOSPITAL ENCOUNTER (EMERGENCY)
Facility: HOSPITAL | Age: 45
Discharge: HOME | End: 2020-04-10
Attending: INTERNAL MEDICINE
Payer: COMMERCIAL

## 2020-04-10 VITALS
TEMPERATURE: 98 F | HEART RATE: 72 BPM | HEIGHT: 63 IN | BODY MASS INDEX: 18.61 KG/M2 | SYSTOLIC BLOOD PRESSURE: 105 MMHG | OXYGEN SATURATION: 99 % | WEIGHT: 105 LBS | RESPIRATION RATE: 18 BRPM | DIASTOLIC BLOOD PRESSURE: 79 MMHG

## 2020-04-10 DIAGNOSIS — R11.2 NAUSEA AND VOMITING, INTRACTABILITY OF VOMITING NOT SPECIFIED, UNSPECIFIED VOMITING TYPE: Primary | ICD-10-CM

## 2020-04-10 DIAGNOSIS — F41.1 GAD (GENERALIZED ANXIETY DISORDER): Chronic | ICD-10-CM

## 2020-04-10 DIAGNOSIS — K21.9 GASTROESOPHAGEAL REFLUX DISEASE, ESOPHAGITIS PRESENCE NOT SPECIFIED: ICD-10-CM

## 2020-04-10 LAB
ALBUMIN SERPL-MCNC: 4.2 G/DL (ref 3.3–5.5)
ALBUMIN SERPL-MCNC: 4.3 G/DL (ref 3.3–5.5)
ALP SERPL-CCNC: 46 U/L (ref 42–141)
ALP SERPL-CCNC: 52 U/L (ref 42–141)
BILIRUB SERPL-MCNC: 0.7 MG/DL (ref 0.2–1.6)
BILIRUB SERPL-MCNC: 0.7 MG/DL (ref 0.2–1.6)
BUN SERPL-MCNC: 14 MG/DL (ref 7–22)
CALCIUM SERPL-MCNC: 10.2 MG/DL (ref 8–10.3)
CHLORIDE SERPL-SCNC: 99 MMOL/L (ref 98–108)
CREAT SERPL-MCNC: 0.6 MG/DL (ref 0.6–1.2)
GLUCOSE SERPL-MCNC: 113 MG/DL (ref 73–118)
POC ALT (SGPT): 13 U/L (ref 10–47)
POC ALT (SGPT): 21 U/L (ref 10–47)
POC AMYLASE: 50 U/L (ref 14–97)
POC AST (SGOT): 32 U/L (ref 11–38)
POC AST (SGOT): 45 U/L (ref 11–38)
POC GGT: 11 U/L (ref 5–65)
POC TCO2: 28 MMOL/L (ref 18–33)
POTASSIUM BLD-SCNC: 3.9 MMOL/L (ref 3.6–5.1)
PROTEIN, POC: 7.7 G/DL (ref 6.4–8.1)
PROTEIN, POC: 8.1 G/DL (ref 6.4–8.1)
SODIUM BLD-SCNC: 138 MMOL/L (ref 128–145)

## 2020-04-10 PROCEDURE — 80053 COMPREHEN METABOLIC PANEL: CPT | Mod: ER

## 2020-04-10 PROCEDURE — 96374 THER/PROPH/DIAG INJ IV PUSH: CPT | Mod: ER

## 2020-04-10 PROCEDURE — 96375 TX/PRO/DX INJ NEW DRUG ADDON: CPT | Mod: ER

## 2020-04-10 PROCEDURE — 99284 EMERGENCY DEPT VISIT MOD MDM: CPT | Mod: 25,ER

## 2020-04-10 PROCEDURE — 96361 HYDRATE IV INFUSION ADD-ON: CPT | Mod: ER

## 2020-04-10 PROCEDURE — 85025 COMPLETE CBC W/AUTO DIFF WBC: CPT | Mod: ER

## 2020-04-10 PROCEDURE — 96376 TX/PRO/DX INJ SAME DRUG ADON: CPT | Mod: ER

## 2020-04-10 PROCEDURE — S0028 INJECTION, FAMOTIDINE, 20 MG: HCPCS | Mod: ER | Performed by: INTERNAL MEDICINE

## 2020-04-10 PROCEDURE — 25000003 PHARM REV CODE 250: Mod: ER | Performed by: INTERNAL MEDICINE

## 2020-04-10 PROCEDURE — 82040 ASSAY OF SERUM ALBUMIN: CPT | Mod: ER,59

## 2020-04-10 PROCEDURE — 63600175 PHARM REV CODE 636 W HCPCS: Mod: ER | Performed by: INTERNAL MEDICINE

## 2020-04-10 RX ORDER — ONDANSETRON 4 MG/1
4 TABLET, ORALLY DISINTEGRATING ORAL EVERY 12 HOURS PRN
Qty: 10 TABLET | Refills: 0 | Status: ON HOLD | OUTPATIENT
Start: 2020-04-10

## 2020-04-10 RX ORDER — FAMOTIDINE 10 MG/ML
20 INJECTION INTRAVENOUS
Status: COMPLETED | OUTPATIENT
Start: 2020-04-10 | End: 2020-04-10

## 2020-04-10 RX ORDER — ONDANSETRON 2 MG/ML
4 INJECTION INTRAMUSCULAR; INTRAVENOUS
Status: COMPLETED | OUTPATIENT
Start: 2020-04-10 | End: 2020-04-10

## 2020-04-10 RX ORDER — PANTOPRAZOLE SODIUM 40 MG/1
40 TABLET, DELAYED RELEASE ORAL DAILY
Qty: 30 TABLET | Refills: 0 | Status: ON HOLD | OUTPATIENT
Start: 2020-04-10 | End: 2021-04-10

## 2020-04-10 RX ORDER — SODIUM CHLORIDE 9 MG/ML
1000 INJECTION, SOLUTION INTRAVENOUS ONCE
Status: COMPLETED | OUTPATIENT
Start: 2020-04-10 | End: 2020-04-10

## 2020-04-10 RX ORDER — ONDANSETRON 2 MG/ML
4 INJECTION INTRAMUSCULAR; INTRAVENOUS
Status: DISCONTINUED | OUTPATIENT
Start: 2020-04-10 | End: 2020-04-10

## 2020-04-10 RX ADMIN — ONDANSETRON 4 MG: 2 INJECTION, SOLUTION INTRAMUSCULAR; INTRAVENOUS at 11:04

## 2020-04-10 RX ADMIN — LIDOCAINE HYDROCHLORIDE: 20 SOLUTION ORAL; TOPICAL at 11:04

## 2020-04-10 RX ADMIN — ONDANSETRON 4 MG: 2 INJECTION, SOLUTION INTRAMUSCULAR; INTRAVENOUS at 10:04

## 2020-04-10 RX ADMIN — LIDOCAINE HYDROCHLORIDE: 20 SOLUTION ORAL; TOPICAL at 10:04

## 2020-04-10 RX ADMIN — FAMOTIDINE 20 MG: 10 INJECTION INTRAVENOUS at 10:04

## 2020-04-10 RX ADMIN — SODIUM CHLORIDE 1000 ML: 0.9 INJECTION, SOLUTION INTRAVENOUS at 10:04

## 2020-04-11 NOTE — ED PROVIDER NOTES
"Encounter Date: 4/10/2020    SCRIBE #1 NOTE: I, Robyn Cavazos, am scribing for, and in the presence of,  Dr. Dyllan Ahumada. I have scribed the following portions of the note - Other sections scribed: HPI, ROS, PE.       History     Chief Complaint   Patient presents with    Abdominal Pain     x 2 days. reports n/v with left uper quad pain.      Brooke Schwartz is a 44 y.o. female who presents to the ED complaining of acute "burning" epigastric abdominal pain x 2 days ago. Endorses nausea and vomiting. Denies fever, constipation, diarrhea and hematochezia. Denies urinary symptoms. Denies vaginal pain, discharge and bleeding. Denies fever, chills, nasal congestion, PND, rhinorrhea, cough and sore throat. Denies SOB and CP.     The history is provided by the patient. No  was used.     Review of patient's allergies indicates:  No Known Allergies  Past Medical History:   Diagnosis Date    Addiction to drug     Alcohol abuse     Anorexia nervosa     Anxiety     Depression     History of psychiatric hospitalization     Hx of psychiatric care     Selena in her 20s for depression    Psychiatric problem     Sleep difficulties     Therapy     Withdrawal symptoms, alcohol      History reviewed. No pertinent surgical history.  Family History   Problem Relation Age of Onset    Depression Mother      Social History     Tobacco Use    Smoking status: Former Smoker     Packs/day: 1.00     Years: 10.00     Pack years: 10.00     Types: Cigarettes, Vaping w/o nicotine     Last attempt to quit: 2019     Years since quittin.2    Smokeless tobacco: Current User    Tobacco comment: Currently vaping   Substance Use Topics    Alcohol use: Yes     Alcohol/week: 4.0 standard drinks     Types: 4 Glasses of wine per week     Comment: drinking half pint to a pint of Fireball whiskey or vodka daily    Drug use: No     Review of Systems   Constitutional: Negative for chills and fever.   HENT: Negative for " congestion, nosebleeds, postnasal drip and sore throat.    Respiratory: Negative for cough and shortness of breath.    Cardiovascular: Negative for chest pain.   Gastrointestinal: Positive for abdominal pain, nausea and vomiting. Negative for blood in stool, constipation and diarrhea.   Genitourinary: Negative for dysuria, frequency, hematuria, urgency, vaginal bleeding, vaginal discharge and vaginal pain.   Musculoskeletal: Negative for back pain.   Skin: Negative for rash.   Neurological: Negative for weakness.   Hematological: Negative for adenopathy.   Psychiatric/Behavioral: Negative for behavioral problems.   All other systems reviewed and are negative.      Physical Exam     Initial Vitals [04/10/20 2154]   BP Pulse Resp Temp SpO2   127/79 71 18 98.4 °F (36.9 °C) 100 %      MAP       --         Physical Exam    Nursing note and vitals reviewed.  Constitutional: She appears well-developed and well-nourished.   HENT:   Head: Normocephalic and atraumatic.   Eyes: Conjunctivae are normal.   Neck: Normal range of motion. Neck supple.   Cardiovascular: Normal rate, regular rhythm and normal heart sounds. Exam reveals no gallop and no friction rub.    No murmur heard.  Pulmonary/Chest: Breath sounds normal. No respiratory distress. She has no wheezes. She has no rhonchi. She has no rales.   Abdominal: Soft. Bowel sounds are normal. She exhibits no distension and no mass. There is no tenderness. There is no rigidity, no rebound, no guarding, no CVA tenderness, no tenderness at McBurney's point and negative Vincent's sign.   Generalized abdominal discomfort. Vomited x2 during exam.     Musculoskeletal: Normal range of motion. She exhibits no edema.   Neurological: She is alert and oriented to person, place, and time. GCS score is 15. GCS eye subscore is 4. GCS verbal subscore is 5. GCS motor subscore is 6.   Skin: Skin is warm and dry.   Psychiatric: She has a normal mood and affect.         ED Course  "  Procedures  Labs Reviewed   POCT LIVER PANEL - Abnormal; Notable for the following components:       Result Value    AST (SGOT), POC 45 (*)     All other components within normal limits   POCT CBC   POCT CMP   POCT LIVER PANEL   POCT CMP          Imaging Results    None          Medical Decision Making:   History:   Old Medical Records: I decided to obtain old medical records.  Initial Assessment:   Brooke Schwartz is a 44 y.o. female who presents to the ED complaining of acute "burning" epigastric abdominal pain x 2 days ago.  Clinical Tests:   Lab Tests: Ordered and Reviewed  Radiological Study: Ordered and Reviewed  ED Management:  Ultrasound was ordered and patient refused.  CBC/CMP revealed no acute abnormalities.  Patient was given GI cocktail Pepcid normal saline and Zofran in the emergency department she states she feels medications.  She was advised to follow up with her primary care physician within the next 3 days for re-evaluation/return to the emergency department if condition worsens.  She received a prescription for Protonix and was given instructions for vomiting/GERD.            Scribe Attestation:   Scribe #1: I performed the above scribed service and the documentation accurately describes the services I performed. I attest to the accuracy of the note.               This document was produced by a scribe under my direction and in my presence. I agree with the content of the note and have made any necessary edits.     Dr. Ahumada    04/11/2020 1:50 AM             Clinical Impression:     1. Nausea and vomiting, intractability of vomiting not specified, unspecified vomiting type    2. ANDI (generalized anxiety disorder)    3. Gastroesophageal reflux disease, esophagitis presence not specified            Disposition:   Disposition: Discharged  Condition: Stable     ED Disposition Condition    Discharge Stable        ED Prescriptions     Medication Sig Dispense Start Date End Date Auth. Provider    " ondansetron (ZOFRAN-ODT) 4 MG TbDL Take 1 tablet (4 mg total) by mouth every 12 (twelve) hours as needed. 10 tablet 4/10/2020  Dyllan Ahumada MD    pantoprazole (PROTONIX) 40 MG tablet Take 1 tablet (40 mg total) by mouth once daily. 30 tablet 4/10/2020 4/10/2021 Dyllan Ahumada MD        Follow-up Information     Follow up With Specialties Details Why Contact Info    Lefty Solis MD General Practice Schedule an appointment as soon as possible for a visit in 2 days For reevaluation [address] [place] [address]  [address]  [place]ED[place] [id number]  [phone]                                       Dyllan Ahumada MD  04/11/20 0151

## 2023-06-23 ENCOUNTER — HOSPITAL ENCOUNTER (EMERGENCY)
Facility: HOSPITAL | Age: 48
Discharge: HOME | End: 2023-06-23
Attending: EMERGENCY MEDICINE
Payer: COMMERCIAL

## 2023-06-23 VITALS
OXYGEN SATURATION: 100 % | HEART RATE: 75 BPM | HEIGHT: 63 IN | RESPIRATION RATE: 16 BRPM | BODY MASS INDEX: 18.6 KG/M2 | TEMPERATURE: 98 F | SYSTOLIC BLOOD PRESSURE: 103 MMHG | DIASTOLIC BLOOD PRESSURE: 52 MMHG

## 2023-06-23 DIAGNOSIS — F41.1 GAD (GENERALIZED ANXIETY DISORDER): ICD-10-CM

## 2023-06-23 DIAGNOSIS — F13.930 BENZODIAZEPINE WITHDRAWAL WITHOUT COMPLICATION: Primary | ICD-10-CM

## 2023-06-23 DIAGNOSIS — R07.89 CHEST TIGHTNESS: ICD-10-CM

## 2023-06-23 LAB
ALBUMIN SERPL-MCNC: 4.5 G/DL (ref 3.3–5.5)
ALP SERPL-CCNC: 49 U/L (ref 42–141)
B-HCG UR QL: NEGATIVE
BILIRUB SERPL-MCNC: 1.1 MG/DL (ref 0.2–1.6)
BILIRUBIN, POC UA: NEGATIVE
BLOOD, POC UA: ABNORMAL
BUN SERPL-MCNC: 15 MG/DL (ref 7–22)
CALCIUM SERPL-MCNC: 9.7 MG/DL (ref 8–10.3)
CHLORIDE SERPL-SCNC: 98 MMOL/L (ref 98–108)
CLARITY, POC UA: CLEAR
COLOR, POC UA: YELLOW
CREAT SERPL-MCNC: 0.5 MG/DL (ref 0.6–1.2)
CTP QC/QA: YES
GLUCOSE SERPL-MCNC: 106 MG/DL (ref 73–118)
GLUCOSE, POC UA: NEGATIVE
KETONES, POC UA: ABNORMAL
LEUKOCYTE EST, POC UA: NEGATIVE
NITRITE, POC UA: NEGATIVE
PH UR STRIP: 5 [PH]
POC ALT (SGPT): 18 U/L (ref 10–47)
POC AST (SGOT): 46 U/L (ref 11–38)
POC B-TYPE NATRIURETIC PEPTIDE: 32.5 PG/ML (ref 0–100)
POC CARDIAC TROPONIN I: 0 NG/ML (ref 0–0.08)
POC D-DI: 709 NG/ML (ref 0–450)
POC TCO2: 25 MMOL/L (ref 18–33)
POTASSIUM BLD-SCNC: 4.1 MMOL/L (ref 3.6–5.1)
PROTEIN, POC UA: ABNORMAL
PROTEIN, POC: 7.3 G/DL (ref 6.4–8.1)
SAMPLE: NORMAL
SODIUM BLD-SCNC: 138 MMOL/L (ref 128–145)
SPECIFIC GRAVITY, POC UA: >=1.03
UROBILINOGEN, POC UA: 0.2 E.U./DL

## 2023-06-23 PROCEDURE — 80053 COMPREHEN METABOLIC PANEL: CPT | Mod: ER

## 2023-06-23 PROCEDURE — 81025 URINE PREGNANCY TEST: CPT | Mod: ER

## 2023-06-23 PROCEDURE — 81003 URINALYSIS AUTO W/O SCOPE: CPT | Mod: ER

## 2023-06-23 PROCEDURE — 25000003 PHARM REV CODE 250: Mod: ER | Performed by: NURSE PRACTITIONER

## 2023-06-23 PROCEDURE — 81025 URINE PREGNANCY TEST: CPT | Mod: ER | Performed by: EMERGENCY MEDICINE

## 2023-06-23 PROCEDURE — 99285 EMERGENCY DEPT VISIT HI MDM: CPT | Mod: 25,ER

## 2023-06-23 PROCEDURE — 85379 FIBRIN DEGRADATION QUANT: CPT | Mod: ER

## 2023-06-23 PROCEDURE — 93010 EKG 12-LEAD: ICD-10-PCS | Mod: ,,, | Performed by: INTERNAL MEDICINE

## 2023-06-23 PROCEDURE — 96375 TX/PRO/DX INJ NEW DRUG ADDON: CPT | Mod: ER

## 2023-06-23 PROCEDURE — 85025 COMPLETE CBC W/AUTO DIFF WBC: CPT | Mod: ER

## 2023-06-23 PROCEDURE — 96361 HYDRATE IV INFUSION ADD-ON: CPT | Mod: ER

## 2023-06-23 PROCEDURE — 93005 ELECTROCARDIOGRAM TRACING: CPT | Mod: ER

## 2023-06-23 PROCEDURE — 93010 ELECTROCARDIOGRAM REPORT: CPT | Mod: ,,, | Performed by: INTERNAL MEDICINE

## 2023-06-23 PROCEDURE — 63600175 PHARM REV CODE 636 W HCPCS: Mod: ER | Performed by: EMERGENCY MEDICINE

## 2023-06-23 PROCEDURE — 25500020 PHARM REV CODE 255: Mod: ER | Performed by: EMERGENCY MEDICINE

## 2023-06-23 PROCEDURE — 96374 THER/PROPH/DIAG INJ IV PUSH: CPT | Mod: ER

## 2023-06-23 PROCEDURE — 83880 ASSAY OF NATRIURETIC PEPTIDE: CPT | Mod: ER

## 2023-06-23 PROCEDURE — 84484 ASSAY OF TROPONIN QUANT: CPT | Mod: ER

## 2023-06-23 RX ORDER — CHLORDIAZEPOXIDE HYDROCHLORIDE 25 MG/1
50 CAPSULE, GELATIN COATED ORAL 4 TIMES DAILY
Qty: 40 CAPSULE | Refills: 0 | Status: ON HOLD | OUTPATIENT
Start: 2023-06-23 | End: 2023-06-28

## 2023-06-23 RX ORDER — ALPRAZOLAM 1 MG/1
TABLET ORAL
Status: ON HOLD | COMMUNITY

## 2023-06-23 RX ORDER — DIAZEPAM 10 MG/2ML
10 INJECTION INTRAMUSCULAR
Status: COMPLETED | OUTPATIENT
Start: 2023-06-23 | End: 2023-06-23

## 2023-06-23 RX ADMIN — IOHEXOL 100 ML: 350 INJECTION, SOLUTION INTRAVENOUS at 11:06

## 2023-06-23 RX ADMIN — SODIUM CHLORIDE 1000 ML: 9 INJECTION, SOLUTION INTRAVENOUS at 08:06

## 2023-06-23 RX ADMIN — DIAZEPAM 10 MG: 5 INJECTION, SOLUTION INTRAMUSCULAR; INTRAVENOUS at 10:06

## 2023-06-23 NOTE — DISCHARGE INSTRUCTIONS
Call your doctor Monday morning for ER follow-up and to get a refill of your Alprazolam.     Thank you for coming to our Emergency Department today. It is important to remember that some problems or medical conditions are difficult to diagnose and may not be found during your Emergency Department visit.     Be sure to follow up with your primary care doctor and review all labs/imaging/tests that were performed during your ER visit with them. Some labs/tests may be outside of the normal range and require non-emergent follow-up and further investigation to help diagnose/exclude/prevent complications or other potentially serious medical conditions that were not addressed during your ER visit.    If you do not have a primary care doctor, you may contact the one listed on your discharge paperwork or you may also call the [facility] Appointment Desk at [phone] to schedule an appointment and establish care with one. It is important to your health that you have a primary care doctor.    Please take all medications as directed. All medications may potentially have side-effects and it is impossible to predict which medications may give you side-effects or what side-effects (if any) they will give you.. If you feel that you are having a negative effect or side-effect of any medication you should immediately stop taking them and seek medical attention. If you feel that you are having a life-threatening reaction call [phone].    Return to the ER with any questions/concerns, new/concerning symptoms, worsening or failure to improve.     Do not drive, swim, climb to height, take a bath, operate heavy machinery, drink alcohol or take potentially sedating medications, sign any legal documents or make any important decisions for 24 hours if you have received any pain medications, sedatives or mood altering drugs during your ER visit or within 24 hours of taking them if they have been prescribed to you.     You can find  additional resources for Dentists, hearing aids, durable medical equipment, low cost pharmacies and other resources at [email or web address]    BELOW THIS LINE ONLY APPLIES IF YOU HAVE A COVID TEST PENDING OR IF YOU HAVE BEEN DIAGNOSED WITH COVID:  Please access [facility] to review the results of your test. Until the results of your COVID test return, you should isolate yourself so as not to potentially spread illness to others.   If your COVID test returns positive, you should isolate yourself so as not to spread illness to others. After five full days, if you are feeling better and you have not had fever for 24 hours, you can return to your typical daily activities, but you must wear a mask around others for an additional 5 days.   If your COVID test returns negative and you are either unvaccinated or more than six months out from your two-dose vaccine and are not yet boosted, you should still quarantine for 5 full days followed by strict mask use for an additional 5 full days.   If your COVID test returns negative and you have received your 2-dose initial vaccine as well as a booster, you should continue strict mask use for 10 full days after the exposure.  For all those exposed, best practice includes a test at day 5 after the exposure. This can be a home test or a test through one of the many testing centers throughout our community.   Masking is always advised to limit the spread of COVID. Cdc.gov is an excellent site to obtain the latest up to date recommendations regarding COVID and COVID testing.     CDC Testing and Quarantine Guidelines for patients with exposure to a known-positive COVID-19 person:  A close exposure is defined as anyone who has had an exposure (masked or unmasked) to a known COVID -19 positive person within 6 feet of someone for a cumulative total of 15 minutes or more over a 24-hour period.   Vaccinated and/or if you recently had a positive covid test within 90 days do NOT need to  quarantine after contact with someone who had COVID-19 unless you develop symptoms.   Fully vaccinated people who have not had a positive test within 90 days, should get tested 3-5 days after their exposure, even if they don't have symptoms and wear a mask indoors in public for 14 days following exposure or until their test result is negative.      Unvaccinated and/or NOT had a positive test within 90 days and meet close exposure  You are required by CDC guidelines to quarantine for at least 5 days from time of exposure followed by 5 days of strict masking. It is recommended, but not required to test after 5 days, unless you develop symptoms, in which case you should test at that time.  If you get tested after 5 days and your test is positive, your 5 day period of isolation starts the day of the positive test.    If your exposure does not meet the above definition, you can return to your normal daily activities to include social distancing, wearing a mask and frequent handwashing.      Here is a link to guidance from the CDC:  https://www.cdc.gov/media/releases/2021/s1227-isolation-quarantine-guidance.html      [facility] Testing Sites:  https://ldh.la.gov/page/3934      [facility] website with testing locations and guidance:  [email or web address]

## 2023-06-23 NOTE — ED PROVIDER NOTES
"Encounter Date: 2023    SCRIBE #1 NOTE: I, Viki Rodriguez, am scribing for, and in the presence of,  Kalyn Hathaway NP. I have scribed the following portions of the note - Other sections scribed: HPI; ROS.     History     Chief Complaint   Patient presents with    Fatigue     C/o weakness starting yesterday. Pt reports she may be having a panic attack. Hx of anxiety. Denies chest pain/SOB.     Brooke Schwartz is a 48 y.o. female with Hx of Anxiety and Depression who presents to the ED for chief complaint of worsening anxiety onset 1 day ago. Patient reports she has a sense of impending doom. She states "everything feels tight and weak." Patient notes she usually takes Xanax but has been out of this medication for a few days.  She states her anxiety doesn't normally get this bad and her  usually talks her down, but he is out of town. Patient denies associated fever, chills, congestion, rhinorrhea, cough, abdominal pain, nausea, or vomiting. No further complaints at this time. Patient notes she quit drinking EtOH about 2 years ago.       The history is provided by the patient. No  was used.   Review of patient's allergies indicates:  No Known Allergies  Past Medical History:   Diagnosis Date    Addiction to drug     Alcohol abuse     Anorexia nervosa     Anxiety     Depression     History of psychiatric hospitalization     Hx of psychiatric care     Celexa in her 20s for depression    Psychiatric problem     Sleep difficulties     Therapy     Withdrawal symptoms, alcohol      History reviewed. No pertinent surgical history.  Family History   Problem Relation Age of Onset    Depression Mother      Social History     Tobacco Use    Smoking status: Former     Packs/day: 1.00     Years: 10.00     Pack years: 10.00     Types: Cigarettes, Vaping w/o nicotine     Quit date: 2019     Years since quittin.4    Smokeless tobacco: Current    Tobacco comments:     Currently vaping   Substance " Use Topics    Alcohol use: Yes     Alcohol/week: 4.0 standard drinks     Types: 4 Glasses of wine per week     Comment: drinking half pint to a pint of Fireball whiskey or vodka daily    Drug use: No     Review of Systems   Constitutional:  Negative for activity change, appetite change, chills, fatigue and fever.   HENT:  Negative for congestion, sore throat, trouble swallowing and voice change.    Eyes:  Negative for photophobia, pain, redness and visual disturbance.   Respiratory:  Negative for cough, chest tightness, shortness of breath and wheezing.    Cardiovascular:  Negative for chest pain, palpitations and leg swelling.   Gastrointestinal:  Negative for abdominal distention, abdominal pain, anal bleeding, constipation, diarrhea, nausea and vomiting.   Endocrine: Negative for polydipsia, polyphagia and polyuria.   Genitourinary:  Negative for difficulty urinating, dysuria, frequency, hematuria, urgency, vaginal bleeding and vaginal discharge.   Musculoskeletal:  Negative for arthralgias and myalgias.   Skin:  Negative for rash and wound.   Neurological:  Negative for dizziness, weakness, light-headedness and headaches.   Hematological:  Negative for adenopathy.   Psychiatric/Behavioral:  The patient is nervous/anxious.      Physical Exam     Initial Vitals   BP Pulse Resp Temp SpO2   06/23/23 0814 06/23/23 0814 06/23/23 0814 06/23/23 0816 06/23/23 0814   115/76 95 20 98 °F (36.7 °C) 99 %      MAP       --                Physical Exam    Constitutional: She appears well-developed and well-nourished. She is not diaphoretic. No distress.   Pleasant.  Nondistressed.   HENT:   Head: Normocephalic and atraumatic.   Neck:   Normal range of motion.  Cardiovascular:  Normal rate, regular rhythm, normal heart sounds and intact distal pulses.           Pulmonary/Chest: Breath sounds normal. No respiratory distress.   Abdominal: Abdomen is soft. Bowel sounds are normal. There is no abdominal tenderness.    Musculoskeletal:         General: Normal range of motion.      Cervical back: Normal range of motion.     Neurological: She is alert and oriented to person, place, and time.   Tremulous.  Tongue fasciculations.   Skin: Skin is warm and dry.   Psychiatric: Her behavior is normal. Her mood appears anxious.       ED Course   Procedures  Labs Reviewed   POCT URINALYSIS W/O SCOPE - Abnormal; Notable for the following components:       Result Value    Ketones, UA Trace (*)     Spec Grav UA >=1.030 (*)     Blood, UA 3+ (*)     Protein, UA 2+ (*)     All other components within normal limits   POCT CMP - Abnormal; Notable for the following components:    AST (SGOT), POC 46 (*)     POC Creatinine 0.5 (*)     All other components within normal limits   POCT D DIMER - Abnormal; Notable for the following components:    POC D- (*)     All other components within normal limits   TROPONIN ISTAT   POCT URINALYSIS W/O SCOPE   POCT URINE PREGNANCY   POCT CBC   POCT CMP   POCT TROPONIN   POCT B-TYPE NATRIURETIC PEPTIDE (BNP)   POCT D DIMER   POCT B-TYPE NATRIURETIC PEPTIDE (BNP)       EKG Readings: (Independently Interpreted)   Initial Reading: No STEMI. Rhythm: Normal Sinus Rhythm. Heart Rate: 99.   EKG with normal sinus rhythm with a ventricular rate of 99 beats per minute.  No STEMI.  EKG interpreted by Dr. Leonardo.     Imaging Results              CTA Chest Non-Coronary (PE Studies) (Final result)  Result time 06/23/23 12:55:33      Final result by Keri Webb MD (06/23/23 12:55:33)                   Impression:      No evidence of pulmonary thromboemboli or right ventricular strain.    Lungs appear clear.    No significant findings.      Electronically signed by: Keri Webb  Date:    06/23/2023  Time:    12:55               Narrative:    EXAMINATION:  CTA CHEST NON CORONARY (PE STUDIES)    CLINICAL HISTORY:  Pulmonary embolism (PE) suspected, positive D-dimer;    TECHNIQUE:  Low dose axial images, sagittal and  coronal reformations were obtained from the thoracic inlet to the lung bases following the IV administration of 100 mL of Omnipaque 350.    COMPARISON:  Chest x-ray 03/20/2018    FINDINGS:  Pulmonary vasculature: There are no convincing thromboemboli noting adequate contrast opacification.  Pulmonary arteries distribute normally.  There are four pulmonary veins.    Palmira/Mediastinum: No pathologic jeremy enlargement.    Airways: Patent.    Lungs/Pleura: Clear lungs. No pleural effusion or thickening.    Aorta: Left-sided aortic arch.  No aneurysm and no significant atherosclerosis    Heart: No right ventricular strain.  The heart is normal size. No pericardial effusion.    Base of Neck: No significant abnormality.    Thoracic soft tissues: Unremarkable.    Esophagus: Unremarkable.    Upper Abdomen: No abnormality of the partially imaged upper abdomen.    Bones: No acute fracture. No suspicious lytic or sclerotic lesions.                                       Medications   LORazepam (ATIVAN) injection 1 mg (1 mg Intravenous Given 6/23/23 0856)   sodium chloride 0.9% bolus 1,000 mL 1,000 mL (0 mLs Intravenous Stopped 6/23/23 1050)   diazePAM injection 10 mg (10 mg Intravenous Given 6/23/23 1003)   iohexoL (OMNIPAQUE 350) injection 100 mL (100 mLs Intravenous Given 6/23/23 1144)     Medical Decision Making:   History:   Old Records Summarized: records from clinic visits and other records.       <> Summary of Records: External documents reviewed  Independently Interpreted Test(s):   I have ordered and independently interpreted X-rays - see prior notes.  I have ordered and independently interpreted EKG Reading(s) - see prior notes  Clinical Tests:   Lab Tests: Reviewed and Ordered  Radiological Study: Ordered and Reviewed  Medical Tests: Ordered and Reviewed  ED Management:  48-year-old female with ANDI presenting to the ED for evaluation of anxiety, impending doom, chest tightness.  On my exam, she appears anxious but is  nondistressed.  Tremor noted.  No tachycardia or hypoxia.  Workup reassuring.  EKG with normal sinus rhythm without acute ischemia.  Troponin negative.  BNP negative.  No significant anemia or electrolyte abnormality on labs.  D-dimer is elevated.  CTA obtaining using shared decision-making which was negative for PE.  Patient was given IV fluids, lorazepam, Valium with improvement in symptoms.  Upon repeat assessment, tremor has improved.  There is no tachycardia.  She remains well-appearing and nontoxic.  She is tolerating oral intake.  She feels comfortable being discharged.  Will start her on Librium.  She will follow up with her PCP next week for medication refill.  Return precautions discussed with the patient verbalized understanding.  She is agreeable to the plan of care.    This case was discussed with my attending physician Dr. Leonardo who examined the patient and agrees with my plan of care.        Scribe Attestation:   Scribe #1: I performed the above scribed service and the documentation accurately describes the services I performed. I attest to the accuracy of the note.    Attending Attestation:   Physician Attestation Statement for Resident:  As the supervising MD   Physician Attestation Statement: I have personally seen and examined this patient.   I agree with the above history.  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 states that she just ran out of her xanax and won't be able to refill her meds for 1 week. she has had an ongoing anxiety attack for the last 3 days.   As the supervising MD I agree with the above PE.   -: Tremulous, +tongue fasciculations    As the supervising MD I agree with the above treatment, course, plan, and disposition.   -: Pt in benzo withdrawal. Will treat with valium. Dispo  pending response to treatment.  Symptoms have resolved with valium. Will start on librium and have her f/u with her doctor asap.      I have staffed the patient with the midlevel/resident provider and was available for consultation in the department. I have guided the treatment plan and agree with the care provided. I additionally agree with their exam and documentation except where mine differs.                   ED Course as of 06/23/23 1506   Fri Jun 23, 2023   1040 Patient reassessed.  Reports feeling improved.  No longer tremulous.  Heart rate 86 beats per minute.  Discussed positive D-dimer.  Using shared decision-making, will obtain CTA to rule out PE. [MT]      ED Course User Index  [MT] Kalyn Hathaway NP          Scribe attestation: IABIGAIL, personally performed the services described in this documentation.  All medical record entries made by the scribe were at my direction and in my presence.  I have reviewed the chart and agree that the record reflects my personal performance and is accurate and complete.         Clinical Impression:   Final diagnoses:  [R07.89] Chest tightness  [F41.1] ANDI (generalized anxiety disorder)  [F13.930] Benzodiazepine withdrawal without complication (Primary)        ED Disposition Condition    Discharge Stable          ED Prescriptions       Medication Sig Dispense Start Date End Date Auth. Provider    chlordiazepoxide (LIBRIUM) 25 MG Cap Take 2 capsules (50 mg total) by mouth 4 (four) times daily. for 5 days 40 capsule 6/23/2023 6/28/2023 Kalyn Hathaway NP          Follow-up Information       Follow up With Specialties Details Why Contact Info    Lefty Solis MD General Practice Schedule an appointment as soon as possible for a visit on 6/26/2023 For follow-up [address] [place] [address] [address]  [facility] [address]  [phone]      [facility] ED Emergency Medicine Go to  If symptoms worsen [address] [facility]  [address]  [phone]             Kalyn Hathaway, MAUREEN  06/23/23 1507       Kalyn Hathaway, MAUREEN  06/23/23 1507       Sea Leonardo MD  06/23/23 1215

## 2024-03-30 ENCOUNTER — HOSPITAL ENCOUNTER (EMERGENCY)
Facility: HOSPITAL | Age: 49
Discharge: PSYCHIATRIC OR SUBSTANCE USE FACILITY | End: 2024-03-30
Attending: EMERGENCY MEDICINE
Payer: COMMERCIAL

## 2024-03-30 VITALS
TEMPERATURE: 98 F | HEIGHT: 62 IN | BODY MASS INDEX: 18.4 KG/M2 | DIASTOLIC BLOOD PRESSURE: 77 MMHG | OXYGEN SATURATION: 98 % | SYSTOLIC BLOOD PRESSURE: 106 MMHG | RESPIRATION RATE: 16 BRPM | HEART RATE: 61 BPM | WEIGHT: 100 LBS

## 2024-03-30 DIAGNOSIS — F31.9 BIPOLAR 1 DISORDER: Primary | ICD-10-CM

## 2024-03-30 DIAGNOSIS — F29 PSYCHOSIS: ICD-10-CM

## 2024-03-30 LAB
ALBUMIN SERPL BCP-MCNC: 3.8 G/DL (ref 3.5–5.2)
ALP SERPL-CCNC: 49 U/L (ref 55–135)
ALT SERPL W/O P-5'-P-CCNC: 39 U/L (ref 10–44)
AMPHET+METHAMPHET UR QL: NEGATIVE
ANION GAP SERPL CALC-SCNC: 9 MMOL/L (ref 8–16)
APAP SERPL-MCNC: <3 UG/ML (ref 10–20)
AST SERPL-CCNC: 60 U/L (ref 10–40)
B-HCG UR QL: NEGATIVE
BARBITURATES UR QL SCN>200 NG/ML: NEGATIVE
BASOPHILS # BLD AUTO: 0.06 K/UL (ref 0–0.2)
BASOPHILS NFR BLD: 1.2 % (ref 0–1.9)
BENZODIAZ UR QL SCN>200 NG/ML: NEGATIVE
BILIRUB SERPL-MCNC: 0.5 MG/DL (ref 0.1–1)
BILIRUB UR QL STRIP: NEGATIVE
BUN SERPL-MCNC: 11 MG/DL (ref 6–20)
BZE UR QL SCN: NEGATIVE
CALCIUM SERPL-MCNC: 9.5 MG/DL (ref 8.7–10.5)
CANNABINOIDS UR QL SCN: ABNORMAL
CHLORIDE SERPL-SCNC: 109 MMOL/L (ref 95–110)
CLARITY UR: CLEAR
CO2 SERPL-SCNC: 21 MMOL/L (ref 23–29)
COLOR UR: YELLOW
CREAT SERPL-MCNC: 0.8 MG/DL (ref 0.5–1.4)
CREAT UR-MCNC: 57.9 MG/DL (ref 15–325)
CRP SERPL-MCNC: 0.3 MG/L (ref 0–8.2)
CTP QC/QA: YES
DIFFERENTIAL METHOD BLD: ABNORMAL
EOSINOPHIL # BLD AUTO: 0.2 K/UL (ref 0–0.5)
EOSINOPHIL NFR BLD: 3.1 % (ref 0–8)
ERYTHROCYTE [DISTWIDTH] IN BLOOD BY AUTOMATED COUNT: 14.9 % (ref 11.5–14.5)
ERYTHROCYTE [SEDIMENTATION RATE] IN BLOOD BY WESTERGREN METHOD: 5 MM/HR (ref 0–20)
EST. GFR  (NO RACE VARIABLE): >60 ML/MIN/1.73 M^2
ETHANOL SERPL-MCNC: <10 MG/DL
GLUCOSE SERPL-MCNC: 87 MG/DL (ref 70–110)
GLUCOSE UR QL STRIP: NEGATIVE
HCT VFR BLD AUTO: 37 % (ref 37–48.5)
HGB BLD-MCNC: 12.1 G/DL (ref 12–16)
HGB UR QL STRIP: NEGATIVE
IMM GRANULOCYTES # BLD AUTO: 0.01 K/UL (ref 0–0.04)
IMM GRANULOCYTES NFR BLD AUTO: 0.2 % (ref 0–0.5)
KETONES UR QL STRIP: NEGATIVE
LEUKOCYTE ESTERASE UR QL STRIP: NEGATIVE
LYMPHOCYTES # BLD AUTO: 1.5 K/UL (ref 1–4.8)
LYMPHOCYTES NFR BLD: 30.9 % (ref 18–48)
MCH RBC QN AUTO: 30.9 PG (ref 27–31)
MCHC RBC AUTO-ENTMCNC: 32.7 G/DL (ref 32–36)
MCV RBC AUTO: 94 FL (ref 82–98)
METHADONE UR QL SCN>300 NG/ML: NEGATIVE
MONOCYTES # BLD AUTO: 0.4 K/UL (ref 0.3–1)
MONOCYTES NFR BLD: 8.8 % (ref 4–15)
NEUTROPHILS # BLD AUTO: 2.7 K/UL (ref 1.8–7.7)
NEUTROPHILS NFR BLD: 55.8 % (ref 38–73)
NITRITE UR QL STRIP: NEGATIVE
NRBC BLD-RTO: 0 /100 WBC
OPIATES UR QL SCN: NEGATIVE
PCP UR QL SCN>25 NG/ML: NEGATIVE
PH UR STRIP: 7 [PH] (ref 5–8)
PLATELET # BLD AUTO: 239 K/UL (ref 150–450)
PMV BLD AUTO: 10.2 FL (ref 9.2–12.9)
POTASSIUM SERPL-SCNC: 4 MMOL/L (ref 3.5–5.1)
PROT SERPL-MCNC: 6.7 G/DL (ref 6–8.4)
PROT UR QL STRIP: NEGATIVE
RBC # BLD AUTO: 3.92 M/UL (ref 4–5.4)
SODIUM SERPL-SCNC: 139 MMOL/L (ref 136–145)
SP GR UR STRIP: 1.01 (ref 1–1.03)
TOXICOLOGY INFORMATION: ABNORMAL
TSH SERPL DL<=0.005 MIU/L-ACNC: 0.55 UIU/ML (ref 0.4–4)
URN SPEC COLLECT METH UR: NORMAL
UROBILINOGEN UR STRIP-ACNC: NEGATIVE EU/DL
WBC # BLD AUTO: 4.86 K/UL (ref 3.9–12.7)

## 2024-03-30 PROCEDURE — 96372 THER/PROPH/DIAG INJ SC/IM: CPT | Performed by: EMERGENCY MEDICINE

## 2024-03-30 PROCEDURE — 80307 DRUG TEST PRSMV CHEM ANLYZR: CPT | Performed by: EMERGENCY MEDICINE

## 2024-03-30 PROCEDURE — 85652 RBC SED RATE AUTOMATED: CPT | Performed by: EMERGENCY MEDICINE

## 2024-03-30 PROCEDURE — 82077 ASSAY SPEC XCP UR&BREATH IA: CPT | Performed by: EMERGENCY MEDICINE

## 2024-03-30 PROCEDURE — 86140 C-REACTIVE PROTEIN: CPT | Performed by: EMERGENCY MEDICINE

## 2024-03-30 PROCEDURE — 86592 SYPHILIS TEST NON-TREP QUAL: CPT | Performed by: EMERGENCY MEDICINE

## 2024-03-30 PROCEDURE — 63600175 PHARM REV CODE 636 W HCPCS: Performed by: EMERGENCY MEDICINE

## 2024-03-30 PROCEDURE — 85025 COMPLETE CBC W/AUTO DIFF WBC: CPT | Performed by: EMERGENCY MEDICINE

## 2024-03-30 PROCEDURE — 80053 COMPREHEN METABOLIC PANEL: CPT | Performed by: EMERGENCY MEDICINE

## 2024-03-30 PROCEDURE — 84443 ASSAY THYROID STIM HORMONE: CPT | Performed by: EMERGENCY MEDICINE

## 2024-03-30 PROCEDURE — 80143 DRUG ASSAY ACETAMINOPHEN: CPT | Performed by: EMERGENCY MEDICINE

## 2024-03-30 PROCEDURE — 81025 URINE PREGNANCY TEST: CPT | Performed by: EMERGENCY MEDICINE

## 2024-03-30 PROCEDURE — 81003 URINALYSIS AUTO W/O SCOPE: CPT | Performed by: EMERGENCY MEDICINE

## 2024-03-30 PROCEDURE — 99291 CRITICAL CARE FIRST HOUR: CPT

## 2024-03-30 RX ORDER — LITHIUM CARBONATE 300 MG/1
300 TABLET, FILM COATED, EXTENDED RELEASE ORAL 2 TIMES DAILY
COMMUNITY

## 2024-03-30 RX ORDER — HALOPERIDOL 5 MG/ML
10 INJECTION INTRAMUSCULAR
Status: COMPLETED | OUTPATIENT
Start: 2024-03-30 | End: 2024-03-30

## 2024-03-30 RX ORDER — PRAZOSIN HYDROCHLORIDE 1 MG/1
1 CAPSULE ORAL NIGHTLY
Status: ON HOLD | COMMUNITY
End: 2024-04-08 | Stop reason: HOSPADM

## 2024-03-30 RX ORDER — LORAZEPAM 2 MG/ML
2 INJECTION INTRAMUSCULAR
Status: COMPLETED | OUTPATIENT
Start: 2024-03-30 | End: 2024-03-30

## 2024-03-30 RX ORDER — ARIPIPRAZOLE 400 MG
KIT INTRAMUSCULAR
Status: ON HOLD | COMMUNITY
End: 2024-04-08 | Stop reason: HOSPADM

## 2024-03-30 RX ADMIN — LORAZEPAM 2 MG: 2 INJECTION INTRAMUSCULAR; INTRAVENOUS at 11:03

## 2024-03-30 RX ADMIN — HALOPERIDOL LACTATE 10 MG: 5 INJECTION, SOLUTION INTRAMUSCULAR at 11:03

## 2024-03-30 NOTE — ED PROVIDER NOTES
"Encounter Date: 3/30/2024    SCRIBE #1 NOTE: I, Jordan Brennan, am scribing for, and in the presence of,  Braydon Seals MD.       History     Chief Complaint   Patient presents with    Psychiatric Evaluation     Pt to the ED via EMS with reports of bizarre behavior. Per EMS, they were called by a bystander who reported pt was seen walking around the neighborhood pulling on door handles and "taking things that werent hers". Pt denies medical or psych hx, denies taking daily medications. Pt reports she did eat CBD gummies last night. Pt denies SI/HI/AH/VH. Per EMS, pt was seen at  ED yesterday for the same and was discharged.      48 y.o. female with PMHx of alcohol abuse, anxiety, depression, psychiatric hospitalization, presents to the ED for psychiatric evaluation. Patient reports that she took "gummies" last night. Assuming CBD/THC gummies? Patient states that she was brought to this facility for evaluation by her " neighborhood." Patient otherwise states that she feels "wonderful" and "happy." Further notes that she still feels high. Denies SI, HI, VH, AH, fever, chills, diaphoresis, headache, chest pain, shortness of breath, cough, abdominal pain, nausea/vomiting/diarrhea, dysuria, or any associated symptoms.    History provider by independent historian, EMS reports of bizarre behavior, stating that they were called by bystander, who reported that the patient was walking around the neighborhood pulling on doorhandles and stealing. Patient was reportedly evaluated at [facility] ED yesterday for the same presentation and was discharged.    Per chart review, patient was seen yesterday at [facility], emergency department for a psychiatric evaluation after taking some THC edibles/gummies, which causes exacerbation of her PTSD.    The history is provided by the patient, the EMS personnel and medical records. No  was used.     Review of patient's allergies indicates:  No Known Allergies  Past Medical " History:   Diagnosis Date    Addiction to drug     Alcohol abuse     Anorexia nervosa     Anxiety     Depression     History of psychiatric hospitalization     Hx of psychiatric care     Selena in her 20s for depression    Psychiatric problem     Sleep difficulties     Therapy     Withdrawal symptoms, alcohol      No past surgical history on file.  Family History   Problem Relation Age of Onset    Depression Mother      Social History     Tobacco Use    Smoking status: Former     Current packs/day: 0.00     Average packs/day: 1 pack/day for 10.0 years (10.0 ttl pk-yrs)     Types: Cigarettes, Vaping w/o nicotine     Start date: 2009     Quit date: 2019     Years since quittin.1    Smokeless tobacco: Current    Tobacco comments:     Currently vaping   Substance Use Topics    Alcohol use: Yes     Alcohol/week: 4.0 standard drinks of alcohol     Types: 4 Glasses of wine per week     Comment: drinking half pint to a pint of Fireball whiskey or vodka daily    Drug use: No     Review of Systems   Constitutional:  Negative for chills, diaphoresis and fever.   HENT:  Negative for congestion, ear pain and sore throat.    Eyes:  Negative for pain.   Respiratory:  Negative for cough and shortness of breath.    Cardiovascular:  Negative for chest pain.   Gastrointestinal:  Negative for abdominal pain, diarrhea, nausea and vomiting.   Genitourinary:  Negative for dysuria.   Musculoskeletal:  Negative for back pain.        (-) Arm or leg trouble.   Skin:  Negative for rash.   Neurological:  Negative for headaches.   Psychiatric/Behavioral:  Negative for confusion, hallucinations and suicidal ideas.         (-) HI.        Physical Exam     Initial Vitals [24 0939]   BP Pulse Resp Temp SpO2   (!) 105/53 61 18 98 °F (36.7 °C) 99 %      MAP       --         Physical Exam  The patient was examined specifically for the following:   General:No significant distress, Good color, Warm and dry. Head and neck:Scalp  atraumatic, Neck supple. Neurological:Appropriate conversation, Gross motor deficits. Eyes:Conjugate gaze, Clear corneas. ENT: No epistaxis. Cardiac: Regular rate and rhythm, Grossly normal heart tones. Pulmonary: Wheezing, Rales. Gastrointestinal: Abdominal tenderness, Abdominal distention. Musculoskeletal: Extremity deformity, Apparent pain with range of motion of the joints. Skin: Rash.   The findings on examination were normal except for the following:  The patient is inappropriately cheerful and silly.  She denies being suicidal homicidal or psychotic.  ED Course   Critical Care    Date/Time: 3/30/2024 2:27 PM    Performed by: Braydon Seals MD  Authorized by: Braydon Seals MD  Direct patient critical care time: 22 minutes  Additional history critical care time: 11 minutes  Ordering / reviewing critical care time: 11 minutes  Documentation critical care time: 11 minutes  Consulting other physicians critical care time: 11 minutes  Total critical care time (exclusive of procedural time) : 66 minutes  Critical care time was exclusive of separately billable procedures and treating other patients and teaching time.  Critical care was necessary to treat or prevent imminent or life-threatening deterioration of the following conditions: CNS failure or compromise.  Critical care was time spent personally by me on the following activities: development of treatment plan with patient or surrogate, discussions with consultants, evaluation of patient's response to treatment, examination of patient, obtaining history from patient or surrogate, ordering and performing treatments and interventions, ordering and review of laboratory studies, ordering and review of radiographic studies, pulse oximetry, re-evaluation of patient's condition and review of old charts.        Labs Reviewed   CBC W/ AUTO DIFFERENTIAL - Abnormal; Notable for the following components:       Result Value    RBC 3.92 (*)     RDW 14.9 (*)     All  other components within normal limits   COMPREHENSIVE METABOLIC PANEL - Abnormal; Notable for the following components:    CO2 21 (*)     Alkaline Phosphatase 49 (*)     AST 60 (*)     All other components within normal limits   DRUG SCREEN PANEL, URINE EMERGENCY - Abnormal; Notable for the following components:    THC Presumptive Positive (*)     All other components within normal limits    Narrative:     Specimen Source->Urine   ACETAMINOPHEN LEVEL - Abnormal; Notable for the following components:    Acetaminophen (Tylenol), Serum <3.0 (*)     All other components within normal limits   TSH   URINALYSIS, REFLEX TO URINE CULTURE    Narrative:     Specimen Source->Urine   ALCOHOL,MEDICAL (ETHANOL)   C-REACTIVE PROTEIN   SEDIMENTATION RATE   C-REACTIVE PROTEIN   SEDIMENTATION RATE   RPR   POCT URINE PREGNANCY          Imaging Results              CT Head Without Contrast (Final result)  Result time 03/30/24 14:03:42      Final result by Devyn Hightower MD (03/30/24 14:03:42)                   Impression:      1. Allowing for motion artifact, no convincing acute intracranial abnormalities.  2. Sinus disease as above.      Electronically signed by: Devyn Hightower MD  Date:    03/30/2024  Time:    14:03               Narrative:    EXAMINATION:  CT HEAD WITHOUT CONTRAST    CLINICAL HISTORY:  Mental status change, unknown cause; Unspecified psychosis not due to a substance or known physiological condition    TECHNIQUE:  Low dose axial images were obtained through the head.  Coronal and sagittal reformations were also performed. Contrast was not administered.    COMPARISON:  None.    FINDINGS:  There is motion artifact.    There is no evidence of acute major vascular territory infarct, hemorrhage, or mass.  There is no hydrocephalus.  There are no abnormal extra-axial fluid collections.  There is complete opacification of the right maxillary sinus and patchy opacification of the ethmoid sinuses.  There are aerated  secretions within the sphenoid sinuses and trace fluid in the inferior most bilateral maxillary sinuses.  No acute displaced calvarial fracture.  The visualized soft tissues are unremarkable.                                       Medications   LORazepam injection 2 mg (2 mg Intramuscular Given 3/30/24 1159)   haloperidol lactate injection 10 mg (10 mg Intramuscular Given 3/30/24 1158)     Medical Decision Making  Amount and/or Complexity of Data Reviewed  Independent Historian: EMS     Details: See HPI.   External Data Reviewed: notes.     Details: See HPI.   Labs: ordered. Decision-making details documented in ED Course.  Radiology: ordered.    Risk  Prescription drug management.    Given the above, this patient presents emergency room with psychotic features.  The patient has a history of late onset bipolar disease with psychotic features.  The patient has never had a CNS workup.  The patient's , who is a physician, called me and asked for a more thorough evaluation of the the patient is neurologic status.  I consulted Dr. Todd, the patient's psychiatrist, who call me.  An RPR,  CT of the brain and an ESR and CRP were performed.  The RPR will be a delayed result.  Sed rate and CRP are not significantly elevated.  CT of the head is unremarkable.  The balance of the clearance laboratory work is unremarkable.  There is no leukocytosis anemia, significant electrolyte abnormalities, hepatic or renal failure demonstrated on the CMP.  The patient is not pregnant.  The urinalysis is clean.  The  specifically asked that the patient not be sent to [facility].  They requests [facility].  I will put in for the transfer.  No significant medical emergencies other abdomen is abnormalities on examination.    This patient is medically clear for transfer to a psychiatric facility.    The patient did require sedation in the emergency with IM medicines because she was uncooperative.        Bruce  Attestation:   Scribe #1: I performed the above scribed service and the documentation accurately describes the services I performed. I attest to the accuracy of the note.           Medically cleared for psychiatry placement: 3/30/2024  2:30 PM                   Clinical Impression:  Final diagnoses:  [F29] Psychosis  [F31.9] Bipolar 1 disorder (Primary)          ED Disposition Condition    Transfer to Psych Facility Stable              Please note that the documentation on this chart was provided by the scribe above on the date of service noted above, and that the documentation in the chart accurately reflects the work and decisions made by me alone.  Signed, Dr. Seals            ED Prescriptions    None       Follow-up Information    None          Braydon Seals MD  03/30/24 [id number]

## 2024-03-30 NOTE — ED NOTES
"Pt up pacing around room, pt states "I want to go home, I can work." Tech states when attempted to do tele psych consult pt attempted to leave and grab at badge. Security at bedside. Pt unable to be redirected. Provider aware.   "

## 2024-03-30 NOTE — ED NOTES
Pt transported to behavioral health facility via SPD. Pt transported out in wheelchair with tech, security, and SPD. Calm and cooperative.

## 2024-03-30 NOTE — ED NOTES
Attempted to remove pts wedding band by multiple Rns, unable to remove. Provider states okay to keep ring on.

## 2024-03-30 NOTE — ED NOTES
Patient's  called for medication list update. Updated in home meds. Per , patient cannot go to [facility]. [phone]

## 2024-03-30 NOTE — ED NOTES
[facility] office was notified pt transferred spoke with dougie at [facility] .

## 2024-03-30 NOTE — ED TRIAGE NOTES
"Pt arrives to ED with c/o psychiatric evaluation. Ems reports bystander in neighborhood calling because pt was "pulling on doors and taking things out of neighbors yard." Pt reports taking "gummies" last night. Pt reports feeling "tired." Denies SI/HI/AH/VH. Pt calm and cooperative at this time. Pt seen yesterday at  for same s/s.   "

## 2024-03-30 NOTE — CONSULTS
"[facility]  Psychiatry  Telepsychiatry Consult Note    Please see previous notes:    Patient agreeable to consultation via telepsychiatry.    Tele-Consultation from Psychiatry started: 3/30/2024 at 11:16am  The chief complaint leading to psychiatric consultation is: Bizarre behavior  This consultation was requested by Dr. Seals, the Emergency Department attending physician.  The location of the consulting psychiatrist is  [place], [place] .  The patient location is  [facility] EMERGENCY DEPARTMENT   The patient arrived at the ED at: 0937 am    Also present with the patient at the time of the consultation: RN and security     Patient Identification:   Brooke Schwartz is a 48 y.o. female.    Patient information was obtained from past medical records, ER records, and primary team.  Patient presented involuntarily to the Emergency Department EMS    Inpatient consult to Telemedicine - Psyc  Consult performed by: Clarice Espinoza MD  Consult ordered by: Braydon Seals MD  Reason for consult: Bizarre behavior      Consult Start Time: 03/30/2024 11:16 CDT  Consult End Time: 03/30/2024 12:30 CDT      Subjective:     History of Present Illness:  Per ED attending note:  48 y.o. female with PMHx of alcohol abuse, anxiety, depression, psychiatric hospitalization, presents to the ED for psychiatric evaluation. Patient reports that she took "gummies" last night. Assuming CBD/THC gummies? Patient states that she was brought to this facility for evaluation by her " neighborhood." Patient otherwise states that she feels "wonderful" and "happy." Further notes that she still feels high. Denies SI, HI, VH, AH, fever, chills, diaphoresis, headache, chest pain, shortness of breath, cough, abdominal pain, nausea/vomiting/diarrhea, dysuria, or any associated symptoms.     History provider by independent historian, EMS reports of bizarre behavior, stating that they were called by bystander, who reported that the patient was walking " around the neighborhood pulling on doorhandles and stealing. Patient was reportedly evaluated at [facility] ED yesterday for the same presentation and was discharged.     Per chart review, patient was seen yesterday at [facility], emergency department for a psychiatric evaluation after taking some THC edibles/gummies, which causes exacerbation of her PTSD.    Psychiatry Interview:    Attempted to interview patient. She was observed pacing in room prior to tele-cart being brought into the room. Upon attempt to interview, pt briefly sits on the bed as requested, but then stands, reaches at computer, attempts to push out of room, asks RN for her cell phone and badge. Security required to maintain patient in room. Interview terminated given inability to participate.     Reviewed labs: ETOH Neg, UDS +THC  Psychiatric History: Obtained from chart review as patient unable to participate in interview.   Previous Psychiatric Hospitalizations: Yes Nov 2023, Jan 2024 for psychotic symptoms, SI. 2018 for SI, depression in context of heavy ETOH use  Previous Medication Trials: Yes Celexa, Effexor, naltrexone, gabapentin  Previous Suicide Attempts: no   History of Violence: No  History of Depression: Yes  History of Viki: Deferred  History of Auditory/Visual Hallucination Yes  History of Delusions: Yes  Outpatient psychiatrist (current & past): Yes    Substance Abuse History:  Tobacco:Yes  Alcohol: Yes: Per chart review, hx of ETOH use d/o, attended [facility] IOP x 2, most recently 2020.   Illicit Substances:Yes THC  Detox/Rehab: [facility]    Legal History: Past charges/incarcerations: Deferred     Family Psychiatric History: Per chart review, mother w hx of depression.       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     Psychiatric Mental Status Exam:  Arousal: alert  Sensorium/Orientation: oriented to  Unable to assess 2/2 agitation  Behavior/Cooperation: uncooperative, psychomotor agitation, restless and fidgety    Speech:  non-spontaneous  Language: not tested  Mood: Unable to assess  Affect: inappropriate, labile, irritable, and angry  Thought Process:  Unable to assess  Thought Content:   Auditory hallucinations: Unable to assess  Visual hallucinations: Unable to assess  Paranoia: YES: Appears paranoid     Delusions:  Unable to assess  Suicidal ideation: Unable to assess  Homicidal ideation: Unable to assess  Attention/Concentration:  Unable to assess  Insight: poor awareness of illness  Judgment: impaired due to mental illness and substance use      Past Medical History:   Past Medical History:   Diagnosis Date    Addiction to drug     Alcohol abuse     Anorexia nervosa     Anxiety     Depression     History of psychiatric hospitalization     Hx of psychiatric care     Selena in her 20s for depression    Psychiatric problem     Sleep difficulties     Therapy     Withdrawal symptoms, alcohol       Laboratory Data:   Labs Reviewed   CBC W/ AUTO DIFFERENTIAL - Abnormal; Notable for the following components:       Result Value    RBC 3.92 (*)     RDW 14.9 (*)     All other components within normal limits   COMPREHENSIVE METABOLIC PANEL - Abnormal; Notable for the following components:    CO2 21 (*)     Alkaline Phosphatase 49 (*)     AST 60 (*)     All other components within normal limits   DRUG SCREEN PANEL, URINE EMERGENCY - Abnormal; Notable for the following components:    THC Presumptive Positive (*)     All other components within normal limits    Narrative:     Specimen Source->Urine   URINALYSIS, REFLEX TO URINE CULTURE    Narrative:     Specimen Source->Urine   ALCOHOL,MEDICAL (ETHANOL)   TSH   ACETAMINOPHEN LEVEL   POCT URINE PREGNANCY       Allergies:   Review of patient's allergies indicates:  No Known Allergies    Medications in ER: Medications - No data to display    Medications at home:   No current facility-administered medications on file prior to encounter.     Current Outpatient Medications on File Prior  to Encounter   Medication Sig Dispense Refill    ALPRAZolam (XANAX) 1 MG tablet alprazolam 1 mg tablet   TAKE 1 TABLET BY MOUTH THREE TIMES DAILY      chlordiazepoxide (LIBRIUM) 25 MG Cap Take 2 capsules (50 mg total) by mouth 4 (four) times daily. for 5 days 40 capsule 0    diazePAM (VALIUM) 5 MG tablet 1 tablet (5 mg total) 3 (three) times daily for 2 days, THEN 1 tablet (5 mg total) 2 (two) times daily for 1 day, THEN 1 tablet (5 mg total) once daily for 1 day. 9 tablet 0    gabapentin (NEURONTIN) 300 MG capsule Take 1 capsule (300 mg total) by mouth 3 (three) times daily. 90 capsule 1    naltrexone (DEPADE) 50 mg tablet Take one tablet by mouth once daily 30 tablet 2    ondansetron (ZOFRAN-ODT) 4 MG TbDL Take 1 tablet (4 mg total) by mouth every 12 (twelve) hours as needed. 10 tablet 0    pantoprazole (PROTONIX) 40 MG tablet Take 1 tablet (40 mg total) by mouth once daily. 30 tablet 0    ranitidine (ZANTAC) 150 MG capsule Take 1 capsule (150 mg total) by mouth 2 (two) times daily. 60 capsule 2    rosuvastatin (CRESTOR) 10 MG tablet Take 10 mg by mouth once daily.      venlafaxine (EFFEXOR-XR) 75 MG 24 hr capsule Take 1 capsule (75 mg total) by mouth once daily. 30 capsule 0         No new subjective & objective note has been filed under this hospital service since the last note was generated.      Assessment - Diagnosis - Goals:     Diagnosis/Impression: 49 yo with hx of ETOH use d/o, psychotic d/o (schizoaffective per chart), depression, PTSD, previous hospitalizations for bizarre behavior, presenting to ED with disorganized bizarre behavior. Seen at [facility] for similar yesterday, discharged home. On attempt to interview, pt grossly disorganized, agitated, unable to participate. Presents as acutely at risk to herself.   Diagnosis:  Psychosis, unspecified  THC use  Hx of ETOH use d/o    Rec:   Agree with [facility] as patient presents danger to self and grave disability due to acute psychotic symptoms.  Recommend seeking acute psychiatric inpatient placement.   For acute agitation, can use olanzapine 10mg PO/IM q6hr prn for agitation that is not responding to non-pharmacological interventions.   Defer initiation of scheduled medications to inpatient team.        Time with patient: 5 min  Additional time: Chart review, documentation, communication with primary team: 35 min      More than 50% of the time was spent counseling/coordinating care    Consulting clinician was informed of the encounter and consult note.    Consultation ended: 3/30/2024 at 12:30 PM    Clarice Espinoza MD   Psychiatry  [facility]

## 2024-03-30 NOTE — ED NOTES
Copy of pec was given to registration and was emailed to [facility] office

## 2024-03-31 PROBLEM — R46.2 BIZARRE BEHAVIOR: Status: ACTIVE | Noted: 2024-03-31

## 2024-03-31 PROBLEM — E78.5 HLD (HYPERLIPIDEMIA): Status: ACTIVE | Noted: 2024-03-31

## 2024-03-31 PROBLEM — F19.10 POLYSUBSTANCE ABUSE: Status: ACTIVE | Noted: 2024-03-31

## 2024-03-31 PROBLEM — R79.89 ELEVATED LFTS: Status: ACTIVE | Noted: 2024-03-31

## 2024-04-01 LAB — RPR SER QL: NORMAL

## 2024-10-22 ENCOUNTER — PATIENT MESSAGE (OUTPATIENT)
Dept: RESEARCH | Facility: HOSPITAL | Age: 49
End: 2024-10-22
Payer: COMMERCIAL

## 2024-11-01 ENCOUNTER — HOSPITAL ENCOUNTER (EMERGENCY)
Facility: HOSPITAL | Age: 49
Discharge: PSYCHIATRIC OR SUBSTANCE USE FACILITY | End: 2024-11-02
Attending: EMERGENCY MEDICINE
Payer: COMMERCIAL

## 2024-11-01 DIAGNOSIS — F30.9 MANIA: ICD-10-CM

## 2024-11-01 DIAGNOSIS — R46.2 BIZARRE BEHAVIOR: Primary | ICD-10-CM

## 2024-11-01 LAB
ALBUMIN SERPL BCP-MCNC: 3.9 G/DL (ref 3.5–5.2)
ALP SERPL-CCNC: 70 U/L (ref 40–150)
ALT SERPL W/O P-5'-P-CCNC: 18 U/L (ref 10–44)
AMPHET+METHAMPHET UR QL: NEGATIVE
ANION GAP SERPL CALC-SCNC: 12 MMOL/L (ref 8–16)
APAP SERPL-MCNC: <3 UG/ML (ref 10–20)
AST SERPL-CCNC: 32 U/L (ref 10–40)
BARBITURATES UR QL SCN>200 NG/ML: NEGATIVE
BASOPHILS # BLD AUTO: 0.09 K/UL (ref 0–0.2)
BASOPHILS NFR BLD: 1.3 % (ref 0–1.9)
BENZODIAZ UR QL SCN>200 NG/ML: NEGATIVE
BILIRUB SERPL-MCNC: 0.4 MG/DL (ref 0.1–1)
BILIRUB UR QL STRIP: NEGATIVE
BUN SERPL-MCNC: 12 MG/DL (ref 6–20)
BZE UR QL SCN: NEGATIVE
CALCIUM SERPL-MCNC: 9.7 MG/DL (ref 8.7–10.5)
CANNABINOIDS UR QL SCN: ABNORMAL
CHLORIDE SERPL-SCNC: 105 MMOL/L (ref 95–110)
CLARITY UR: CLEAR
CO2 SERPL-SCNC: 20 MMOL/L (ref 23–29)
COLOR UR: YELLOW
CREAT SERPL-MCNC: 0.8 MG/DL (ref 0.5–1.4)
CREAT UR-MCNC: 63.7 MG/DL (ref 15–325)
CTP QC/QA: YES
DIFFERENTIAL METHOD BLD: ABNORMAL
EOSINOPHIL # BLD AUTO: 0 K/UL (ref 0–0.5)
EOSINOPHIL NFR BLD: 0.4 % (ref 0–8)
ERYTHROCYTE [DISTWIDTH] IN BLOOD BY AUTOMATED COUNT: 13.2 % (ref 11.5–14.5)
EST. GFR  (NO RACE VARIABLE): >60 ML/MIN/1.73 M^2
ETHANOL SERPL-MCNC: <10 MG/DL
GLUCOSE SERPL-MCNC: 83 MG/DL (ref 70–110)
GLUCOSE UR QL STRIP: NEGATIVE
HCT VFR BLD AUTO: 37.1 % (ref 37–48.5)
HGB BLD-MCNC: 12.5 G/DL (ref 12–16)
HGB UR QL STRIP: NEGATIVE
IMM GRANULOCYTES # BLD AUTO: 0.01 K/UL (ref 0–0.04)
IMM GRANULOCYTES NFR BLD AUTO: 0.1 % (ref 0–0.5)
KETONES UR QL STRIP: NEGATIVE
LEUKOCYTE ESTERASE UR QL STRIP: NEGATIVE
LYMPHOCYTES # BLD AUTO: 2.8 K/UL (ref 1–4.8)
LYMPHOCYTES NFR BLD: 39.6 % (ref 18–48)
MCH RBC QN AUTO: 31.9 PG (ref 27–31)
MCHC RBC AUTO-ENTMCNC: 33.7 G/DL (ref 32–36)
MCV RBC AUTO: 95 FL (ref 82–98)
METHADONE UR QL SCN>300 NG/ML: NEGATIVE
MONOCYTES # BLD AUTO: 0.6 K/UL (ref 0.3–1)
MONOCYTES NFR BLD: 8.9 % (ref 4–15)
NEUTROPHILS # BLD AUTO: 3.5 K/UL (ref 1.8–7.7)
NEUTROPHILS NFR BLD: 49.7 % (ref 38–73)
NITRITE UR QL STRIP: NEGATIVE
NRBC BLD-RTO: 0 /100 WBC
OPIATES UR QL SCN: NEGATIVE
PCP UR QL SCN>25 NG/ML: NEGATIVE
PH UR STRIP: 6 [PH] (ref 5–8)
PLATELET # BLD AUTO: 316 K/UL (ref 150–450)
PMV BLD AUTO: 9.6 FL (ref 9.2–12.9)
POTASSIUM SERPL-SCNC: 3.8 MMOL/L (ref 3.5–5.1)
PROT SERPL-MCNC: 7.5 G/DL (ref 6–8.4)
PROT UR QL STRIP: NEGATIVE
RBC # BLD AUTO: 3.92 M/UL (ref 4–5.4)
SARS-COV-2 RDRP RESP QL NAA+PROBE: NEGATIVE
SODIUM SERPL-SCNC: 137 MMOL/L (ref 136–145)
SP GR UR STRIP: 1.01 (ref 1–1.03)
TOXICOLOGY INFORMATION: ABNORMAL
URN SPEC COLLECT METH UR: NORMAL
UROBILINOGEN UR STRIP-ACNC: NEGATIVE EU/DL
WBC # BLD AUTO: 7 K/UL (ref 3.9–12.7)

## 2024-11-01 PROCEDURE — 82077 ASSAY SPEC XCP UR&BREATH IA: CPT | Performed by: EMERGENCY MEDICINE

## 2024-11-01 PROCEDURE — 87635 SARS-COV-2 COVID-19 AMP PRB: CPT | Performed by: EMERGENCY MEDICINE

## 2024-11-01 PROCEDURE — 84443 ASSAY THYROID STIM HORMONE: CPT | Performed by: EMERGENCY MEDICINE

## 2024-11-01 PROCEDURE — 80143 DRUG ASSAY ACETAMINOPHEN: CPT | Performed by: EMERGENCY MEDICINE

## 2024-11-01 PROCEDURE — 85025 COMPLETE CBC W/AUTO DIFF WBC: CPT | Performed by: EMERGENCY MEDICINE

## 2024-11-01 PROCEDURE — 25000003 PHARM REV CODE 250: Performed by: EMERGENCY MEDICINE

## 2024-11-01 PROCEDURE — 80053 COMPREHEN METABOLIC PANEL: CPT | Performed by: EMERGENCY MEDICINE

## 2024-11-01 PROCEDURE — 99285 EMERGENCY DEPT VISIT HI MDM: CPT

## 2024-11-01 PROCEDURE — 81003 URINALYSIS AUTO W/O SCOPE: CPT | Mod: 59 | Performed by: EMERGENCY MEDICINE

## 2024-11-01 PROCEDURE — 80307 DRUG TEST PRSMV CHEM ANLYZR: CPT | Performed by: EMERGENCY MEDICINE

## 2024-11-01 RX ORDER — LIDOCAINE HYDROCHLORIDE 20 MG/ML
15 SOLUTION OROPHARYNGEAL ONCE
Status: COMPLETED | OUTPATIENT
Start: 2024-11-02 | End: 2024-11-01

## 2024-11-01 RX ORDER — LORAZEPAM 0.5 MG/1
1 TABLET ORAL
Status: COMPLETED | OUTPATIENT
Start: 2024-11-01 | End: 2024-11-01

## 2024-11-01 RX ADMIN — LORAZEPAM 1 MG: 0.5 TABLET ORAL at 11:11

## 2024-11-01 RX ADMIN — LIDOCAINE HYDROCHLORIDE 15 ML: 20 SOLUTION ORAL at 11:11

## 2024-11-02 VITALS
TEMPERATURE: 97 F | DIASTOLIC BLOOD PRESSURE: 67 MMHG | OXYGEN SATURATION: 98 % | BODY MASS INDEX: 20.24 KG/M2 | SYSTOLIC BLOOD PRESSURE: 130 MMHG | RESPIRATION RATE: 20 BRPM | HEIGHT: 62 IN | WEIGHT: 110 LBS | HEART RATE: 87 BPM

## 2024-11-02 PROBLEM — K59.00 CONSTIPATION: Status: ACTIVE | Noted: 2024-11-02

## 2024-11-02 LAB — TSH SERPL DL<=0.005 MIU/L-ACNC: 0.55 UIU/ML (ref 0.4–4)

## 2024-11-02 PROCEDURE — 25000003 PHARM REV CODE 250: Performed by: STUDENT IN AN ORGANIZED HEALTH CARE EDUCATION/TRAINING PROGRAM

## 2024-11-02 PROCEDURE — 25000003 PHARM REV CODE 250: Performed by: EMERGENCY MEDICINE

## 2024-11-02 PROCEDURE — G0425 INPT/ED TELECONSULT30: HCPCS | Mod: GT,,, | Performed by: PSYCHIATRY & NEUROLOGY

## 2024-11-02 RX ORDER — LORAZEPAM 0.5 MG/1
0.5 TABLET ORAL
Status: COMPLETED | OUTPATIENT
Start: 2024-11-02 | End: 2024-11-02

## 2024-11-02 RX ORDER — LIDOCAINE HYDROCHLORIDE 20 MG/ML
15 SOLUTION OROPHARYNGEAL
Status: COMPLETED | OUTPATIENT
Start: 2024-11-02 | End: 2024-11-02

## 2024-11-02 RX ADMIN — LORAZEPAM 0.5 MG: 0.5 TABLET ORAL at 03:11

## 2024-11-02 RX ADMIN — LIDOCAINE HYDROCHLORIDE 15 ML: 20 SOLUTION ORAL at 03:11

## 2024-11-02 NOTE — CONSULTS
"[facility]  Psychiatry  Telepsychiatry Consult Note    Please see previous notes:    Patient agreeable to consultation via telepsychiatry.    Tele-Consultation from Psychiatry started: 11/2/2024 at 12:39am  The chief complaint leading to psychiatric consultation is: chente  This consultation was requested by Dr Portillo, the Emergency Department attending physician.  The location of the consulting psychiatrist is  [place] .  The patient location is  [facility] EMERGENCY DEPARTMENT   The patient arrived at the ED at: [facility]    Also present with the patient at the time of the consultation: nobody    Patient Identification:   Brooke Schwartz is a 49 y.o. female.    Patient information was obtained from patient and past medical records.  Patient presented involuntarily to the Emergency Department     Consults  Teleconsult Time Documentation  Subjective:     History of Present Illness:  48yo F with hx of PTSD, Bipolar brought into ED for manic behavior.    Per ED note-  "  Pt with a history of BiPolar, PTSD, anxiety.  EMS called by  for bizarre behavior and running in out of traffic.  Pt is oriented x 3, denies SI, HI or A/V hallucinations.  Denies ETOH or drug use.        49-year-old female history of bipolar disorder presenting with a bizarre behavior.  History obtained from EMS and the patient's has been.  The patient was reportedly acting abnormally tonight.  The patient was running in the traffic.  The patient was difficult to reorient.  She was pointing into random cars and attempting to talk to the individuals in the cars.  In more recent history the patient has been taking photos of the neighbors and running into the ERs in the middle of the night.     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 She kept falling asleep. Would not answer most questions. Reported she was at home prior to coming to the hospital. She then fell asleep and refused " "to answer further questions.  This is the extent of patients complaints at this time  Unable to obtain ros due to inability to participate.      Per chart hx and updated where indicated-  "Past Psychiatric History:  Previous Medication Trials: yes, seroquel, Abilify Maintenna, lithium  Previous Psychiatric Hospitalizations: Yes, see above  Previous Suicide Attempts: no   History of Violence: yes - per  and has required PRNs in past for belligerence, agitation   Outpatient psychiatrist: [facility] [place]    Social History:  Marital Status:   Children: 0  Source of Income: Worker's comp  Education: MD and residency  Housing Status: With significant other  History of phys/sexual abuse: no  Easy access to gun: no    Substance Use (with emphasis over the last 12 months)  Recreational Drugs: endorses alcohol use and "weed gummies", has had benzodiazepines on urine in past  Use of Alcohol: heavy  Tobacco Use: no  Rehab History: yes  H/O Complicated Withdrawal: no    Legal History:  Past Charges/Incarcerations: no  Pending charges: no    Family Psychiatric History:  no "        Psychiatric Mental Status Exam:  Arousal: lethargic  Sensorium/Orientation: oriented to unable to assess due to inability to participate  Behavior/Cooperation: unable to assess due to inability to participate  Speech: mumbled  Language: not tested  Mood: unable to assess due to inability to participate  Affect: unable to assess due to inability to participate  Thought Process: unable to assess due to inability to participate  Thought Content:   Auditory hallucinations: unable to assess due to inability to participate  Visual hallucinations: unable to assess due to inability to participate  Paranoia: unable to assess due to inability to participate  Delusions:  unable to assess due to inability to participate  Suicidal ideation: unable to assess due to inability to participate  Homicidal ideation: unable to assess due to inability to " participate  Attention/Concentration:  unable to assess due to inability to participate  Memory:    Recent:  unable to assess due to inability to participate   Remote: unable to assess due to inability to participate     Fund of Knowledge: unable to assess due to inability to participate  Abstract reasoning: unable to assess due to inability to participate  Insight: unable to assess due to inability to participate  Judgment: unable to assess due to inability to participate      Past Medical History:   Past Medical History:   Diagnosis Date    Addiction to drug     Alcohol abuse     Anorexia nervosa     Anxiety     Depression     History of psychiatric hospitalization     Hx of psychiatric care     Selena in her 20s for depression    Psychiatric problem     Sleep difficulties     Therapy     Withdrawal symptoms, alcohol       Laboratory Data:   Labs Reviewed   CBC W/ AUTO DIFFERENTIAL - Abnormal       Result Value    WBC 7.00      RBC 3.92 (*)     Hemoglobin 12.5      Hematocrit 37.1      MCV 95      MCH 31.9 (*)     MCHC 33.7      RDW 13.2      Platelets 316      MPV 9.6      Immature Granulocytes 0.1      Gran # (ANC) 3.5      Immature Grans (Abs) 0.01      Lymph # 2.8      Mono # 0.6      Eos # 0.0      Baso # 0.09      nRBC 0      Gran % 49.7      Lymph % 39.6      Mono % 8.9      Eosinophil % 0.4      Basophil % 1.3      Differential Method Automated     COMPREHENSIVE METABOLIC PANEL - Abnormal    Sodium 137      Potassium 3.8      Chloride 105      CO2 20 (*)     Glucose 83      BUN 12      Creatinine 0.8      Calcium 9.7      Total Protein 7.5      Albumin 3.9      Total Bilirubin 0.4      Alkaline Phosphatase 70      AST 32      ALT 18      eGFR >60      Anion Gap 12     DRUG SCREEN PANEL, URINE EMERGENCY - Abnormal    Benzodiazepines Negative      Methadone metabolites Negative      Cocaine (Metab.) Negative      Opiate Scrn, Ur Negative      Barbiturate Screen, Ur Negative      Amphetamine Screen, Ur  Negative      THC Presumptive Positive (*)     Phencyclidine Negative      Creatinine, Urine 63.7      Toxicology Information SEE COMMENT      Narrative:     Specimen Source->Urine   ACETAMINOPHEN LEVEL - Abnormal    Acetaminophen (Tylenol), Serum <3.0 (*)    TSH    TSH 0.553     URINALYSIS, REFLEX TO URINE CULTURE    Specimen UA Urine, Clean Catch      Color, UA Yellow      Appearance, UA Clear      pH, UA 6.0      Specific Gravity, UA 1.010      Protein, UA Negative      Glucose, UA Negative      Ketones, UA Negative      Bilirubin (UA) Negative      Occult Blood UA Negative      Nitrite, UA Negative      Urobilinogen, UA Negative      Leukocytes, UA Negative      Narrative:     Specimen Source->Urine   ALCOHOL,MEDICAL (ETHANOL)    Alcohol, Serum <10     SARS-COV-2 RDRP GENE    POC Rapid COVID Negative       Acceptable Yes           Allergies:   Review of patient's allergies indicates:  No Known Allergies    Medications in ER:   Medications   LORazepam tablet 1 mg (1 mg Oral Given 11/1/24 2305)   LIDOcaine viscous HCl 2% oral solution 15 mL (15 mLs Oral Given 11/1/24 2323)       Medications at home: reviewed in MAR    No new subjective & objective note has been filed under this hospital service since the last note was generated.      Assessment - Diagnosis - Goals:     Diagnosis/Impression:   Viki (probable)    Rec:   Continue PEC for risk of harm to self/grave disability. Inpatient psychiatric tx once medically cleared.  Ativan 1-2mg IV/IM q 4hours prn severe non redirectable agitation   1:1 sitter  Will defer to inpatient psychiatric team to start/modify scheduled medications.    Plan of Care communicated to: ED provider    Time with patient, coordinating care: 18min      More than 50% of the time was spent counseling/coordinating care    Consulting clinician was informed of the encounter and consult note.    Consultation ended: 11/2/2024 at 1:12am    Juan Talamantes MD  Psychiatry  [facility]

## 2024-11-02 NOTE — ED PROVIDER NOTES
Encounter Date: 2024       History     Chief Complaint   Patient presents with    Psychiatric Evaluation     Pt with a history of BiPolar, PTSD, anxiety.  EMS called by  for bizarre behavior and running in out of traffic.  Pt is oriented x 3, denies SI, HI or A/V hallucinations.  Denies ETOH or drug use.       49-year-old female history of bipolar disorder presenting with a bizarre behavior.  History obtained from EMS and the patient's has been.  The patient was reportedly acting abnormally tonight.  The patient was running in the traffic.  The patient was difficult to reorient.  She was pointing into random cars and attempting to talk to the individuals in the cars.  In more recent history the patient has been taking photos of the neighbors and running into the ERs in the middle of the night.    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      Review of patient's allergies indicates:  No Known Allergies  Past Medical History:   Diagnosis Date    Addiction to drug     Alcohol abuse     Anorexia nervosa     Anxiety     Depression     History of psychiatric hospitalization     Hx of psychiatric care     Celexa in her 20s for depression    Psychiatric problem     Sleep difficulties     Therapy     Withdrawal symptoms, alcohol      No past surgical history on file.  Family History   Problem Relation Name Age of Onset    Depression Mother       Social History     Tobacco Use    Smoking status: Former     Current packs/day: 0.00     Average packs/day: 1 pack/day for 10.0 years (10.0 ttl pk-yrs)     Types: Cigarettes, Vaping w/o nicotine     Start date: 2009     Quit date: 2019     Years since quittin.7    Smokeless tobacco: Current    Tobacco comments:     Currently vaping   Substance Use Topics    Alcohol use: Yes     Alcohol/week: 4.0 standard drinks of alcohol     Types: 4 Glasses of wine per week     Comment: drinking half pint to a pint of Fireball whiskey or vodka daily     Drug use: No     Review of Systems   Respiratory:  Negative for shortness of breath.    Cardiovascular:  Negative for chest pain.   Gastrointestinal:  Negative for abdominal pain and nausea.   Musculoskeletal:  Negative for back pain.   Skin:  Negative for rash.   Neurological:  Negative for headaches.   Psychiatric/Behavioral:  Positive for confusion and sleep disturbance.        Physical Exam     Initial Vitals [11/01/24 2221]   BP Pulse Resp Temp SpO2   120/60 107 18 98.6 °F (37 °C) 98 %      MAP       --         Physical Exam    Nursing note and vitals reviewed.  Constitutional: She appears well-developed and well-nourished. She does not appear ill. No distress.   HENT:   Head: Normocephalic.   Small white lesions noted to the lateral aspect of the tongue.   Eyes: Conjunctivae and EOM are normal.   Neck:   Normal range of motion.  Cardiovascular:  Normal rate, regular rhythm and normal heart sounds.           No murmur heard.  Pulmonary/Chest: Breath sounds normal. No respiratory distress. She has no wheezes.   Abdominal: Abdomen is soft. There is no abdominal tenderness.   Musculoskeletal:         General: No edema.      Cervical back: Normal range of motion.     Neurological: She is alert. GCS eye subscore is 4. GCS verbal subscore is 5. GCS motor subscore is 6.   Skin: Skin is warm.   Psychiatric: Her speech is not slurred. She is actively hallucinating. Cognition and memory are impaired.   Abnormal movements and pointing at what appears to be other people that are not present.  Talking to individuals that are not present. She is inattentive.         ED Course   Procedures  Labs Reviewed   CBC W/ AUTO DIFFERENTIAL - Abnormal       Result Value    WBC 7.00      RBC 3.92 (*)     Hemoglobin 12.5      Hematocrit 37.1      MCV 95      MCH 31.9 (*)     MCHC 33.7      RDW 13.2      Platelets 316      MPV 9.6      Immature Granulocytes 0.1      Gran # (ANC) 3.5      Immature Grans (Abs) 0.01      Lymph # 2.8       Mono # 0.6      Eos # 0.0      Baso # 0.09      nRBC 0      Gran % 49.7      Lymph % 39.6      Mono % 8.9      Eosinophil % 0.4      Basophil % 1.3      Differential Method Automated     COMPREHENSIVE METABOLIC PANEL - Abnormal    Sodium 137      Potassium 3.8      Chloride 105      CO2 20 (*)     Glucose 83      BUN 12      Creatinine 0.8      Calcium 9.7      Total Protein 7.5      Albumin 3.9      Total Bilirubin 0.4      Alkaline Phosphatase 70      AST 32      ALT 18      eGFR >60      Anion Gap 12     DRUG SCREEN PANEL, URINE EMERGENCY - Abnormal    Benzodiazepines Negative      Methadone metabolites Negative      Cocaine (Metab.) Negative      Opiate Scrn, Ur Negative      Barbiturate Screen, Ur Negative      Amphetamine Screen, Ur Negative      THC Presumptive Positive (*)     Phencyclidine Negative      Creatinine, Urine 63.7      Toxicology Information SEE COMMENT      Narrative:     Specimen Source->Urine   ACETAMINOPHEN LEVEL - Abnormal    Acetaminophen (Tylenol), Serum <3.0 (*)    TSH    TSH 0.553     URINALYSIS, REFLEX TO URINE CULTURE    Specimen UA Urine, Clean Catch      Color, UA Yellow      Appearance, UA Clear      pH, UA 6.0      Specific Gravity, UA 1.010      Protein, UA Negative      Glucose, UA Negative      Ketones, UA Negative      Bilirubin (UA) Negative      Occult Blood UA Negative      Nitrite, UA Negative      Urobilinogen, UA Negative      Leukocytes, UA Negative      Narrative:     Specimen Source->Urine   ALCOHOL,MEDICAL (ETHANOL)    Alcohol, Serum <10     SARS-COV-2 RDRP GENE    POC Rapid COVID Negative       Acceptable Yes            Imaging Results    None          Medications   LORazepam tablet 1 mg (1 mg Oral Given 11/1/24 2305)   LIDOcaine viscous HCl 2% oral solution 15 mL (15 mLs Oral Given 11/1/24 6243)     Medical Decision Making    49-year-old female presenting with bizarre behavior.  Patient acting erratically.  History was obtained from the patient's  .  Reportedly the patient has been walking into the patient's yards at night and taking photos.  The patient appears to be hallucinating during my evaluation.  Patient was recently diagnosed with bipolar disorder.  Patient was placed on a pec for bizarre behavior and hallucinations.        Consultation with Psychiatry, Dr. Talamantes.  Continuation of the pec recommended.    Medical Decision Making:     A. Problem List:  1. Viki  2. Hallucinating     B. Differential diagnosis:    Manic episode, schizophrenia, illicit substance use     C. Independent historians:   Patient has has been to provide further history regarding the event    Part of the note was done using electronic dictation services.           Amount and/or Complexity of Data Reviewed  Labs: ordered.    Risk  Prescription drug management.               ED Course as of 11/02/24 0119 Fri Nov 01, 2024   2256 Call to Turner, spouse.     October 10-21st went to [facility] due to running into traffic. Diagnosed with bipolar d/o. Staying up all night in neighbors yards. Was doing well after discharge but has been acting abnormally and wandering. Going through neighbors mailboxes and taking photos.     Tonight she was walking up to random cars and pointing. Started running across [place] in traffic. After catching up to her she wouldn't sit still. Was acting normally yesterday.     No alcohol or illicit substance abuse reported.     Requesting no [facility]. Preference for [facility].  [JM]   8730 Discussed the purpose of the HIV test with the patient.  The patient is refusing HIV testing stating that this is only an injury from her tongue. [JM]      ED Course User Index  [JM] Braydon Portillo MD       Medically cleared for psychiatry placement: 11/2/2024 12:27 AM                   Clinical Impression:  Final diagnoses:  [R46.2] Bizarre behavior (Primary)  [F30.9] Viki          ED Disposition Condition    Transfer to Psych Facility Stable          ED Prescriptions     None       Follow-up Information    None          Braydon Portillo MD  11/02/24 0029       Braydon Portillo MD  11/02/24 0119

## 2024-11-02 NOTE — ED NOTES
"Brooke Schwartz, an 49 y.o. female presents to the ED via EMS reporting manic behavior. EMS personnel reports that  expressed concern for patient being manic and going in traffic and demonstrating bizarre behavior. Pt axo3. Pt states "ask my neighbors, husbands and inge officers what's going on with me  because I can't tell you why I'm here".       Chief Complaint   Patient presents with    Psychiatric Evaluation     Pt with a history of BiPolar, PTSD, anxiety.  EMS called by  for bizarre behavior and running in out of traffic.  Pt is oriented x 3, denies SI, HI or A/V hallucinations.  Denies ETOH or drug use.       Review of patient's allergies indicates:  No Known Allergies  Past Medical History:   Diagnosis Date    Addiction to drug     Alcohol abuse     Anorexia nervosa     Anxiety     Depression     History of psychiatric hospitalization     Hx of psychiatric care     Celexa in her 20s for depression    Psychiatric problem     Sleep difficulties     Therapy     Withdrawal symptoms, alcohol        "

## 2024-11-02 NOTE — ED NOTES
Attempted to call  to let him know of placement and relay the message patient request. Unsuccessful attempt.

## 2024-11-04 PROBLEM — R63.8 INADEQUATE ORAL INTAKE: Status: ACTIVE | Noted: 2024-11-04

## 2024-11-21 ENCOUNTER — HOSPITAL ENCOUNTER (EMERGENCY)
Facility: HOSPITAL | Age: 49
Discharge: PSYCHIATRIC OR SUBSTANCE USE FACILITY | End: 2024-11-22
Attending: EMERGENCY MEDICINE
Payer: COMMERCIAL

## 2024-11-21 DIAGNOSIS — Z91.89 AT RISK FOR MEDICATION NONADHERENCE: ICD-10-CM

## 2024-11-21 DIAGNOSIS — Z00.8 MEDICAL CLEARANCE FOR PSYCHIATRIC ADMISSION: Primary | ICD-10-CM

## 2024-11-21 DIAGNOSIS — F12.90 MARIJUANA USE: ICD-10-CM

## 2024-11-21 DIAGNOSIS — Z78.9 ALCOHOL USE: ICD-10-CM

## 2024-11-21 DIAGNOSIS — F30.10 MANIC BEHAVIOR: ICD-10-CM

## 2024-11-21 LAB
ALBUMIN SERPL BCP-MCNC: 3.7 G/DL (ref 3.5–5.2)
ALP SERPL-CCNC: 88 U/L (ref 40–150)
ALT SERPL W/O P-5'-P-CCNC: 27 U/L (ref 10–44)
AMPHET+METHAMPHET UR QL: NEGATIVE
ANION GAP SERPL CALC-SCNC: 10 MMOL/L (ref 8–16)
APAP SERPL-MCNC: <3 UG/ML (ref 10–20)
AST SERPL-CCNC: 22 U/L (ref 10–40)
B-HCG UR QL: NEGATIVE
BARBITURATES UR QL SCN>200 NG/ML: NEGATIVE
BASOPHILS # BLD AUTO: 0.08 K/UL (ref 0–0.2)
BASOPHILS NFR BLD: 0.9 % (ref 0–1.9)
BENZODIAZ UR QL SCN>200 NG/ML: NEGATIVE
BILIRUB SERPL-MCNC: 0.2 MG/DL (ref 0.1–1)
BILIRUB UR QL STRIP: NEGATIVE
BUN SERPL-MCNC: 8 MG/DL (ref 6–20)
BZE UR QL SCN: NEGATIVE
CALCIUM SERPL-MCNC: 9.4 MG/DL (ref 8.7–10.5)
CANNABINOIDS UR QL SCN: ABNORMAL
CHLORIDE SERPL-SCNC: 106 MMOL/L (ref 95–110)
CLARITY UR: CLEAR
CO2 SERPL-SCNC: 23 MMOL/L (ref 23–29)
COLOR UR: COLORLESS
CREAT SERPL-MCNC: 0.8 MG/DL (ref 0.5–1.4)
CREAT UR-MCNC: 15.7 MG/DL (ref 15–325)
CTP QC/QA: YES
DIFFERENTIAL METHOD BLD: ABNORMAL
EOSINOPHIL # BLD AUTO: 0.2 K/UL (ref 0–0.5)
EOSINOPHIL NFR BLD: 1.7 % (ref 0–8)
ERYTHROCYTE [DISTWIDTH] IN BLOOD BY AUTOMATED COUNT: 13.3 % (ref 11.5–14.5)
EST. GFR  (NO RACE VARIABLE): >60 ML/MIN/1.73 M^2
ETHANOL SERPL-MCNC: 192 MG/DL
ETHANOL SERPL-MCNC: 38 MG/DL
GLUCOSE SERPL-MCNC: 76 MG/DL (ref 70–110)
GLUCOSE UR QL STRIP: NEGATIVE
HCT VFR BLD AUTO: 35.5 % (ref 37–48.5)
HGB BLD-MCNC: 11.9 G/DL (ref 12–16)
HGB UR QL STRIP: NEGATIVE
IMM GRANULOCYTES # BLD AUTO: 0.03 K/UL (ref 0–0.04)
IMM GRANULOCYTES NFR BLD AUTO: 0.3 % (ref 0–0.5)
KETONES UR QL STRIP: NEGATIVE
LEUKOCYTE ESTERASE UR QL STRIP: NEGATIVE
LYMPHOCYTES # BLD AUTO: 3.1 K/UL (ref 1–4.8)
LYMPHOCYTES NFR BLD: 35 % (ref 18–48)
MCH RBC QN AUTO: 30.7 PG (ref 27–31)
MCHC RBC AUTO-ENTMCNC: 33.5 G/DL (ref 32–36)
MCV RBC AUTO: 92 FL (ref 82–98)
METHADONE UR QL SCN>300 NG/ML: NEGATIVE
MONOCYTES # BLD AUTO: 0.7 K/UL (ref 0.3–1)
MONOCYTES NFR BLD: 7.5 % (ref 4–15)
NEUTROPHILS # BLD AUTO: 4.8 K/UL (ref 1.8–7.7)
NEUTROPHILS NFR BLD: 54.6 % (ref 38–73)
NITRITE UR QL STRIP: NEGATIVE
NRBC BLD-RTO: 0 /100 WBC
OPIATES UR QL SCN: NEGATIVE
PCP UR QL SCN>25 NG/ML: NEGATIVE
PH UR STRIP: 6 [PH] (ref 5–8)
PLATELET # BLD AUTO: 266 K/UL (ref 150–450)
PMV BLD AUTO: 9.9 FL (ref 9.2–12.9)
POTASSIUM SERPL-SCNC: 3.3 MMOL/L (ref 3.5–5.1)
PROT SERPL-MCNC: 7.5 G/DL (ref 6–8.4)
PROT UR QL STRIP: NEGATIVE
RBC # BLD AUTO: 3.87 M/UL (ref 4–5.4)
SODIUM SERPL-SCNC: 139 MMOL/L (ref 136–145)
SP GR UR STRIP: 1 (ref 1–1.03)
TOXICOLOGY INFORMATION: ABNORMAL
TSH SERPL DL<=0.005 MIU/L-ACNC: 0.53 UIU/ML (ref 0.4–4)
URN SPEC COLLECT METH UR: ABNORMAL
UROBILINOGEN UR STRIP-ACNC: NEGATIVE EU/DL
VALPROATE SERPL-MCNC: <12.5 UG/ML (ref 50–100)
WBC # BLD AUTO: 8.78 K/UL (ref 3.9–12.7)

## 2024-11-21 PROCEDURE — 96372 THER/PROPH/DIAG INJ SC/IM: CPT | Performed by: STUDENT IN AN ORGANIZED HEALTH CARE EDUCATION/TRAINING PROGRAM

## 2024-11-21 PROCEDURE — 80143 DRUG ASSAY ACETAMINOPHEN: CPT | Performed by: EMERGENCY MEDICINE

## 2024-11-21 PROCEDURE — 80307 DRUG TEST PRSMV CHEM ANLYZR: CPT | Performed by: EMERGENCY MEDICINE

## 2024-11-21 PROCEDURE — 63600175 PHARM REV CODE 636 W HCPCS: Performed by: STUDENT IN AN ORGANIZED HEALTH CARE EDUCATION/TRAINING PROGRAM

## 2024-11-21 PROCEDURE — 80164 ASSAY DIPROPYLACETIC ACD TOT: CPT | Performed by: EMERGENCY MEDICINE

## 2024-11-21 PROCEDURE — 85025 COMPLETE CBC W/AUTO DIFF WBC: CPT | Performed by: EMERGENCY MEDICINE

## 2024-11-21 PROCEDURE — 93010 ELECTROCARDIOGRAM REPORT: CPT | Mod: ,,, | Performed by: INTERNAL MEDICINE

## 2024-11-21 PROCEDURE — 84443 ASSAY THYROID STIM HORMONE: CPT | Performed by: EMERGENCY MEDICINE

## 2024-11-21 PROCEDURE — 82077 ASSAY SPEC XCP UR&BREATH IA: CPT | Mod: 91 | Performed by: STUDENT IN AN ORGANIZED HEALTH CARE EDUCATION/TRAINING PROGRAM

## 2024-11-21 PROCEDURE — 63600175 PHARM REV CODE 636 W HCPCS: Performed by: EMERGENCY MEDICINE

## 2024-11-21 PROCEDURE — 96372 THER/PROPH/DIAG INJ SC/IM: CPT | Performed by: EMERGENCY MEDICINE

## 2024-11-21 PROCEDURE — 81003 URINALYSIS AUTO W/O SCOPE: CPT | Performed by: EMERGENCY MEDICINE

## 2024-11-21 PROCEDURE — 81025 URINE PREGNANCY TEST: CPT | Performed by: EMERGENCY MEDICINE

## 2024-11-21 PROCEDURE — 99285 EMERGENCY DEPT VISIT HI MDM: CPT | Mod: 25

## 2024-11-21 PROCEDURE — 82077 ASSAY SPEC XCP UR&BREATH IA: CPT | Performed by: EMERGENCY MEDICINE

## 2024-11-21 PROCEDURE — 80053 COMPREHEN METABOLIC PANEL: CPT | Performed by: EMERGENCY MEDICINE

## 2024-11-21 PROCEDURE — 93005 ELECTROCARDIOGRAM TRACING: CPT

## 2024-11-21 RX ORDER — HALOPERIDOL 5 MG/ML
5 INJECTION INTRAMUSCULAR
Status: COMPLETED | OUTPATIENT
Start: 2024-11-21 | End: 2024-11-21

## 2024-11-21 RX ORDER — LORAZEPAM 2 MG/ML
1 INJECTION INTRAMUSCULAR
Status: COMPLETED | OUTPATIENT
Start: 2024-11-21 | End: 2024-11-21

## 2024-11-21 RX ORDER — DIPHENHYDRAMINE HYDROCHLORIDE 50 MG/ML
25 INJECTION INTRAMUSCULAR; INTRAVENOUS
Status: COMPLETED | OUTPATIENT
Start: 2024-11-21 | End: 2024-11-21

## 2024-11-21 RX ORDER — LORAZEPAM 2 MG/ML
2 INJECTION INTRAMUSCULAR
Status: COMPLETED | OUTPATIENT
Start: 2024-11-21 | End: 2024-11-21

## 2024-11-21 RX ADMIN — DIPHENHYDRAMINE HYDROCHLORIDE 25 MG: 50 INJECTION INTRAMUSCULAR; INTRAVENOUS at 03:11

## 2024-11-21 RX ADMIN — LORAZEPAM 2 MG: 2 INJECTION INTRAMUSCULAR; INTRAVENOUS at 08:11

## 2024-11-21 RX ADMIN — HALOPERIDOL LACTATE 5 MG: 5 INJECTION, SOLUTION INTRAMUSCULAR at 08:11

## 2024-11-21 RX ADMIN — HALOPERIDOL LACTATE 5 MG: 5 INJECTION, SOLUTION INTRAMUSCULAR at 03:11

## 2024-11-21 RX ADMIN — LORAZEPAM 1 MG: 2 INJECTION INTRAMUSCULAR; INTRAVENOUS at 03:11

## 2024-11-21 NOTE — ED PROVIDER NOTES
Encounter Date: 11/21/2024       History     Chief Complaint   Patient presents with    Psychiatric Evaluation     Pt chief complaint is psychiatric evaluation. Pt is delusional thinks she is the rapper Virgil George, pt appears to be in no distress and not complaining of any SI/HI.       49-year-old female with history of bipolar disorder, generalized anxiety disorder, depression with psychotic features presents to the emergency department with manic symptoms.  History primarily obtained by the patient's spouse, he reports patient was discharged from [facility] on Friday.  She was to follow-up at an outpatient facility, however, she would not participate in care.  He notes since discharge, she has been pocketing medications, putting them and gum, spitting them out in the bottom of Coke drinks.  He states he does not believe she has use drugs or drink alcohol but can not be sure.  He reports the patient has been wandering up and down the streets, calling neighbor's repeatedly, hypersexual behavior making comments about other men.  She has been hospitalized multiple times before for psychiatric reasons.  She was recently discharged on Depakote, Risperdal, Ativan.    The history is provided by medical records and the spouse. The history is limited by the condition of the patient.     Review of patient's allergies indicates:  No Known Allergies  Past Medical History:   Diagnosis Date    Addiction to drug     Alcohol abuse     Anorexia nervosa     Anxiety     Depression     Encounter for medical screening examination     History of psychiatric hospitalization     Hx of psychiatric care     Celexa in her 20s for depression    Psychiatric problem     Sleep difficulties     Therapy     Withdrawal symptoms, alcohol      History reviewed. No pertinent surgical history.  Family History   Problem Relation Name Age of Onset    Depression Mother       Social History     Tobacco Use    Smoking status: Former     Current packs/day:  0.00     Average packs/day: 1 pack/day for 10.0 years (10.0 ttl pk-yrs)     Types: Cigarettes, Vaping w/o nicotine     Start date: 2009     Quit date: 2019     Years since quittin.8    Smokeless tobacco: Current    Tobacco comments:     Currently vaping   Substance Use Topics    Alcohol use: Yes     Alcohol/week: 4.0 standard drinks of alcohol     Types: 4 Glasses of wine per week     Comment: drinking half pint to a pint of Fireball whiskey or vodka daily    Drug use: Yes     Types: Marijuana     Comment: Unknown last use     Review of Systems   Unable to perform ROS: Psychiatric disorder       Physical Exam     Initial Vitals [24 1455]   BP Pulse Resp Temp SpO2   (!) 109/58 90 18 98.5 °F (36.9 °C) 98 %      MAP       --         Physical Exam    Constitutional: She appears well-developed and well-nourished. She is not diaphoretic. No distress.   HENT:   Head: Normocephalic and atraumatic.   Eyes: Conjunctivae are normal.   Cardiovascular:  Normal rate and regular rhythm.           Pulmonary/Chest: No respiratory distress.   Abdominal: She exhibits no distension.     Neurological: She is alert. GCS score is 15. GCS eye subscore is 4. GCS verbal subscore is 5. GCS motor subscore is 6.   Ambulatory with a steady gait   Psychiatric: Her affect is labile and inappropriate. Her speech is rapid and/or pressured. She is hyperactive. Thought content is paranoid and delusional. She expresses impulsivity and inappropriate judgment. She is inattentive.     Patient states she is Virgil George, she has removed her clothing, she is making hypersexual comments.    ED Course   Procedures  Labs Reviewed   CBC W/ AUTO DIFFERENTIAL - Abnormal       Result Value    WBC 8.78      RBC 3.87 (*)     Hemoglobin 11.9 (*)     Hematocrit 35.5 (*)     MCV 92      MCH 30.7      MCHC 33.5      RDW 13.3      Platelets 266      MPV 9.9      Immature Granulocytes 0.3      Gran # (ANC) 4.8      Immature Grans (Abs) 0.03      Lymph #  3.1      Mono # 0.7      Eos # 0.2      Baso # 0.08      nRBC 0      Gran % 54.6      Lymph % 35.0      Mono % 7.5      Eosinophil % 1.7      Basophil % 0.9      Differential Method Automated     COMPREHENSIVE METABOLIC PANEL - Abnormal    Sodium 139      Potassium 3.3 (*)     Chloride 106      CO2 23      Glucose 76      BUN 8      Creatinine 0.8      Calcium 9.4      Total Protein 7.5      Albumin 3.7      Total Bilirubin 0.2      Alkaline Phosphatase 88      AST 22      ALT 27      eGFR >60      Anion Gap 10     URINALYSIS, REFLEX TO URINE CULTURE - Abnormal    Specimen UA Urine, Clean Catch      Color, UA Colorless (*)     Appearance, UA Clear      pH, UA 6.0      Specific Gravity, UA 1.005      Protein, UA Negative      Glucose, UA Negative      Ketones, UA Negative      Bilirubin (UA) Negative      Occult Blood UA Negative      Nitrite, UA Negative      Urobilinogen, UA Negative      Leukocytes, UA Negative      Narrative:     Specimen Source->Urine   DRUG SCREEN PANEL, URINE EMERGENCY - Abnormal    Benzodiazepines Negative      Methadone metabolites Negative      Cocaine (Metab.) Negative      Opiate Scrn, Ur Negative      Barbiturate Screen, Ur Negative      Amphetamine Screen, Ur Negative      THC Presumptive Positive (*)     Phencyclidine Negative      Creatinine, Urine 15.7      Toxicology Information SEE COMMENT      Narrative:     Specimen Source->Urine   ALCOHOL,MEDICAL (ETHANOL) - Abnormal    Alcohol, Serum 192 (*)    ACETAMINOPHEN LEVEL - Abnormal    Acetaminophen (Tylenol), Serum <3.0 (*)    VALPROIC ACID - Abnormal    Valproic Acid Level <12.5 (*)    ALCOHOL,MEDICAL (ETHANOL) - Abnormal    Alcohol, Serum 38 (*)    TSH    TSH 0.528     POCT URINE PREGNANCY    POC Preg Test, Ur Negative       Acceptable Yes          ECG Results              EKG 12-lead (Preliminary result)  Result time 11/21/24 15:49:53      Wet Read by Angelica Bonilla MD (11/21/24 15:49:53, [facility] - Emergency  Dept, Emergency Medicine)    Normal sinus rhythm, rate 70 beats per minute, normal [place] interval,  milliseconds, no STEMI.                                  Imaging Results    None          Medications   LORazepam injection 1 mg (1 mg Intramuscular Given 11/21/24 1520)   diphenhydrAMINE injection 25 mg (25 mg Intramuscular Given 11/21/24 1520)   haloperidol lactate injection 5 mg (5 mg Intramuscular Given 11/21/24 1520)   haloperidol lactate injection 5 mg (5 mg Intramuscular Given 11/21/24 2038)   LORazepam injection 2 mg (2 mg Intramuscular Given 11/21/24 2038)     Medical Decision Making  49-year-old female with history bipolar disorder, major depression with psychotic features, anxiety disorder presents to the emergency department with manic behavior.  Recently discharged from [facility] on Friday on Risperdal, Depakote, Ativan.  Patient's  provides history, she was not participating in outpatient therapy and has been non adherent with medications.  She has been hospitalized multiple times before for psychiatric reasons.  On exam, the patient has manic behavior, she is hypersexual, she has delusions.  I will pec the patient, I believe she is gravely disabled, I also believe she is at risk of harming herself due to her behavior.  Will obtain medical clearance labs.    Amount and/or Complexity of Data Reviewed  Labs: ordered.     Details: Labs reviewed, UPT negative, urine drug screen positive for THC, urinalysis negative for blood, nitrites, leukocytes, CBC without leukocytosis, hemoglobin 11.9, hematocrit 35.5, normal platelet count.  CMP with a potassium of 3.3, otherwise within acceptable limits.  TSH normal.  Ethanol level 192.  Valproate level negative.    Risk  Prescription drug management.    Care signed out to Dr. Talamantes pending repeat ethanol and medical clearance.     Angelica Bonilla MD   6:28 PM             ED Course as of 11/21/24 2301   Thu Nov 21, 2024 1811 Patient reassessed, sleeping  in bed, no distress noted.  [LH]      ED Course User Index  [LH] Angelica Bonilla MD       Medically cleared for psychiatry placement: 11/21/2024 10:21 PM                   Clinical Impression:  Final diagnoses:  [Z00.8] Medical clearance for psychiatric admission (Primary)  [Z78.9] Alcohol use  [F30.10] Manic behavior  [F12.90] Marijuana use  [Z91.89] At risk for medication nonadherence       This dictation has been generated using M-Modal Fluency Direct dictation; some phonetic errors may occur.      ED Disposition Condition    Transfer to Psych Facility Stable          ED Prescriptions    None       Follow-up Information    None          Kevin Talamantes MD  11/21/24 1580

## 2024-11-21 NOTE — ED NOTES
Pt asleep in bed. Pt in no distress. Resting comfortably. RR even and unlabored. Tech at bedside for continuous monitoring and q15 min checks. Will continue to monitor.

## 2024-11-21 NOTE — ED TRIAGE NOTES
"Pt BIB Spouse to ED for Manic behavior. Pt recently discharged from [facility] on Friday. Per spouse. Pt was roaming neighborhood, knocking on doors. Pt calling police officers multiple times a day. Pt walking in the street. Per spouse pt awake at least 20 hours a day. Hx of alcohol and substance abuse per Spouse. Unknown if pt had any alcohol or marijuana use today. Pt with constant rambling upon entry to ED. Pt asking for conjugal visits. Pt extremely inappropriate with staff. Pt took off all clothing, got on the bed in room on hands and knees with buttocks facing staff stating "I am not putting that on. I want to fuck. Please call my ." Pt trying to continuously be redirected. Pt making inappropriate gestures to security guards. Pt appears manic at present. Pacing the room. Pt states "All of my niggas are TTG like Virgil George." Per spouse pt with hallucinations and delusions while at home. Pt awake and alert, not answer any questions appropriately for MD. Pt just continuing to make obscene statements. Sitter at bedside for safety and continuous monitoring.   "

## 2024-11-21 NOTE — ED NOTES
Called and faxed [facility] patient to [facility] spoke to Dior.  PEC scanned into patient's chart by registration.

## 2024-11-22 VITALS
DIASTOLIC BLOOD PRESSURE: 55 MMHG | SYSTOLIC BLOOD PRESSURE: 93 MMHG | HEART RATE: 66 BPM | TEMPERATURE: 98 F | HEIGHT: 62 IN | OXYGEN SATURATION: 98 % | WEIGHT: 110 LBS | BODY MASS INDEX: 20.24 KG/M2 | RESPIRATION RATE: 16 BRPM

## 2024-11-22 NOTE — PROVIDER PROGRESS NOTES - EMERGENCY DEPT.
Patient signed out to me at the change of shift. I refer you to the prior provider's history and physical examination for comprehensive evaluation. Remainder of my involvement in this patient's case will be dictated below.     DISCLAIMER: This note was prepared with deCarta voice recognition transcription software. Garbled syntax, mangled pronouns, and other bizarre constructions may be attributed to that software system.      ED Course as of 11/21/24 2221   Thu Nov 21, 2024   1811 Patient reassessed, sleeping in bed, no distress noted.  [LH]      ED Course User Index  [LH] Angelica Bonilla MD     Patient care signed out to me pending repeat alcohol level to ensure it is below 100 so patient can be medical cleared for placement to psychiatric facility    10:20 PM   Repeat alcohol of 38.  Patient at this time medically cleared for psychiatric placement.    Kevin Talamantes MD            Clinical Impression:   Final diagnoses:  [Z00.8] Medical clearance for psychiatric admission (Primary)  [Z78.9] Alcohol use  [F30.10] Manic behavior  [F12.90] Marijuana use  [Z91.89] At risk for medication nonadherence          ED Disposition Condition    Transfer to Psych Facility Stable          ED Prescriptions    None       Follow-up Information    None

## 2024-11-22 NOTE — ED NOTES
Report received from NEY Bautista. Care taken over. Pt resting quietly on stretcher. Sitter at bedside documenting q15min checks.

## 2024-11-23 LAB
OHS QRS DURATION: 72 MS
OHS QTC CALCULATION: 396 MS

## 2024-11-27 PROBLEM — F31.9 BIPOLAR 1 DISORDER: Status: ACTIVE | Noted: 2024-11-27

## 2024-12-09 NOTE — PROGRESS NOTES
Wound Care Center Progress Visit      Mark Bourgeois  AGE: 70 y.o.   GENDER: male  : 1954  EPISODE DATE:  2024   Referred by: Hyacinth Garcia MD     Subjective:     CHIEF COMPLAINT  WOUND   Problem List Items Addressed This Visit          Other    Nonhealing surgical wound, sequela    Skin ulcer of left lower leg with fat layer exposed (HCC) - Primary     Chief Complaint   Patient presents with    Wound Check        HISTORY of PRESENT ILLNESS      Mark Bourgeois is a 70 y.o. male who presents to the Wound Clinic for an initial visit for evaluation and treatment of Chronic non-healing surgical  ulcer(s) of  left lower leg. The condition is of moderate severity. The ulcer has been present since .  The underlying cause is thought to be nonhealing surgical post CABG 24. Most recently  s/p LEFT LOWER EXTREMITY EXPLORATION WITH WASHOUT; WOUND VAC PLACEMENT  on 24. The patients care to date has included evaluation per CV surgeon with referral to the wound clinic. The patient has significant underlying medical conditions as below. Hyacinth Garcia MD . Advanced wound care modalities established with initiation of care at the wound clinic.The patient has significant underlying medical conditions as below. Hyacinth Garcia MD .    Living Situation  [x] Home [] SNF []  Assisted living  [] Other (ie rehab, etc.) [] Home Health  []  Transportation    Wound Pain Timing/Severity: waxing and waning, mild  Quality of pain: dull, aching, tender  Severity of pain:  3 / 10   Modifying Factors: edema, obesity, and prediabetes  Associated Signs/Symptoms: edema, drainage, and pain    Wound status:  2024       Regimen discussed and established with patient/family as below. The patients records were reviewed and discussed.  Time was given for questions. All questions were answered to the patients satisfaction.      [x] Stable [] Worse [] Reopened [] Improved [] Initial visit []  Revisit []  Healed    [x]  Group Psychotherapy (PhD/LCSW)    Site: [facility]    Clinical status of patient: Intensive Outpatient Program (IOP)    Date: 4/24/2018    Group Focus: Disease Model of Addiction      Length of service: 45585 - 45-50 minutes    Number of patients in attendance: 6    Referred by: Addictive Behavior Unit Treatment Team    Target symptoms: Alcohol Abuse    Patient's response to treatment: Active Listening and Self-disclosure    Progress toward goals: Progressing adequately    Interval History:  Discussed the basic neuropsychological concepts of the Disease Model of Addiction and their relevance to the phenomena of substance abuse and recovery. Noted the way the Disease Model comports with 12-step principles & activities and its value in sustaining long-term sobriety.     Diagnosis: alcohol use disorder, severe, dependence    Plan: Continue treatment on ABU

## 2025-05-20 ENCOUNTER — HOSPITAL ENCOUNTER (EMERGENCY)
Facility: HOSPITAL | Age: 50
Discharge: HOME | End: 2025-05-20
Attending: EMERGENCY MEDICINE
Payer: COMMERCIAL

## 2025-05-20 VITALS
SYSTOLIC BLOOD PRESSURE: 101 MMHG | DIASTOLIC BLOOD PRESSURE: 69 MMHG | RESPIRATION RATE: 18 BRPM | HEART RATE: 67 BPM | OXYGEN SATURATION: 100 % | HEIGHT: 62 IN | BODY MASS INDEX: 21.16 KG/M2 | TEMPERATURE: 98 F | WEIGHT: 115 LBS

## 2025-05-20 DIAGNOSIS — K62.89 PROCTITIS: ICD-10-CM

## 2025-05-20 DIAGNOSIS — R10.32 LLQ ABDOMINAL PAIN: ICD-10-CM

## 2025-05-20 DIAGNOSIS — K52.9 COLITIS: Primary | ICD-10-CM

## 2025-05-20 PROBLEM — R10.9 ABDOMINAL PAIN: Status: ACTIVE | Noted: 2025-05-20

## 2025-05-20 LAB
ALBUMIN SERPL-MCNC: 4.1 G/DL (ref 3.3–5.5)
ALBUMIN SERPL-MCNC: 4.3 G/DL (ref 3.3–5.5)
ALP SERPL-CCNC: 53 U/L (ref 42–141)
ALP SERPL-CCNC: 54 U/L (ref 42–141)
BILIRUB SERPL-MCNC: 1.3 MG/DL (ref 0.2–1.6)
BILIRUB SERPL-MCNC: 1.3 MG/DL (ref 0.2–1.6)
BILIRUBIN, POC UA: ABNORMAL
BLOOD, POC UA: NEGATIVE
BUN SERPL-MCNC: 9 MG/DL (ref 7–22)
CALCIUM SERPL-MCNC: 10.1 MG/DL (ref 8–10.3)
CHLORIDE SERPL-SCNC: 106 MMOL/L (ref 98–108)
CLARITY, UA: CLEAR
COLOR, UA: YELLOW
CREAT SERPL-MCNC: 0.5 MG/DL (ref 0.6–1.2)
GLUCOSE SERPL-MCNC: 106 MG/DL (ref 73–118)
GLUCOSE, POC UA: NEGATIVE
HCT, POC: NORMAL
HGB, POC: NORMAL (ref 14–18)
KETONES, POC UA: ABNORMAL
LEUKOCYTE EST, POC UA: NEGATIVE
MCH, POC: NORMAL
MCHC, POC: NORMAL
MCV, POC: NORMAL
MPV, POC: NORMAL
NITRITE, POC UA: NEGATIVE
OHS QRS DURATION: 64 MS
OHS QTC CALCULATION: 417 MS
PH UR STRIP: 5 [PH] (ref 5–8)
POC ALT (SGPT): 18 U/L (ref 10–47)
POC ALT (SGPT): 19 U/L (ref 10–47)
POC AMYLASE: 54 U/L (ref 14–97)
POC AST (SGOT): 28 U/L (ref 11–38)
POC AST (SGOT): 34 U/L (ref 11–38)
POC B-TYPE NATRIURETIC PEPTIDE: 15.8 PG/ML (ref 0–100)
POC CARDIAC TROPONIN I: 0 NG/ML (ref 0–0.08)
POC GGT: 11 U/L (ref 5–65)
POC PLATELET COUNT: NORMAL
POC PTINR: 1.2 (ref 0.9–1.2)
POC PTWBT: 11.9 SEC (ref 9.7–14.3)
POC TCO2: 28 MMOL/L (ref 18–33)
POTASSIUM BLD-SCNC: 4.6 MMOL/L (ref 3.6–5.1)
PROTEIN, POC UA: NEGATIVE
PROTEIN, POC: 8 G/DL (ref 6.4–8.1)
PROTEIN, POC: 8.2 G/DL (ref 6.4–8.1)
RBC, POC: NORMAL
RDW, POC: NORMAL
SAMPLE: NORMAL
SAMPLE: NORMAL
SODIUM BLD-SCNC: 140 MMOL/L (ref 128–145)
SPECIFIC GRAVITY, POC UA: >=1.03 (ref 1–1.03)
UROBILINOGEN, POC UA: 0.2 E.U./DL
WBC, POC: NORMAL

## 2025-05-20 PROCEDURE — 83880 ASSAY OF NATRIURETIC PEPTIDE: CPT | Mod: ER

## 2025-05-20 PROCEDURE — 82150 ASSAY OF AMYLASE: CPT | Mod: ER

## 2025-05-20 PROCEDURE — 80053 COMPREHEN METABOLIC PANEL: CPT | Mod: ER

## 2025-05-20 PROCEDURE — 25000003 PHARM REV CODE 250: Mod: ER | Performed by: EMERGENCY MEDICINE

## 2025-05-20 PROCEDURE — 96361 HYDRATE IV INFUSION ADD-ON: CPT | Mod: ER

## 2025-05-20 PROCEDURE — 84484 ASSAY OF TROPONIN QUANT: CPT | Mod: ER

## 2025-05-20 PROCEDURE — 93005 ELECTROCARDIOGRAM TRACING: CPT | Mod: ER

## 2025-05-20 PROCEDURE — 25500020 PHARM REV CODE 255: Mod: ER | Performed by: EMERGENCY MEDICINE

## 2025-05-20 PROCEDURE — 82040 ASSAY OF SERUM ALBUMIN: CPT | Mod: 59,ER

## 2025-05-20 PROCEDURE — 96374 THER/PROPH/DIAG INJ IV PUSH: CPT | Mod: 59,ER

## 2025-05-20 PROCEDURE — 63600175 PHARM REV CODE 636 W HCPCS: Mod: ER | Performed by: EMERGENCY MEDICINE

## 2025-05-20 PROCEDURE — 99285 EMERGENCY DEPT VISIT HI MDM: CPT | Mod: 25,ER

## 2025-05-20 PROCEDURE — 85025 COMPLETE CBC W/AUTO DIFF WBC: CPT | Mod: ER

## 2025-05-20 PROCEDURE — 96372 THER/PROPH/DIAG INJ SC/IM: CPT | Performed by: EMERGENCY MEDICINE

## 2025-05-20 PROCEDURE — 93010 ELECTROCARDIOGRAM REPORT: CPT | Mod: ,,, | Performed by: INTERNAL MEDICINE

## 2025-05-20 RX ORDER — CIPROFLOXACIN 500 MG/1
500 TABLET, FILM COATED ORAL
Status: COMPLETED | OUTPATIENT
Start: 2025-05-20 | End: 2025-05-20

## 2025-05-20 RX ORDER — METRONIDAZOLE 500 MG/1
500 TABLET ORAL 3 TIMES DAILY
Qty: 30 TABLET | Refills: 0 | Status: SHIPPED | OUTPATIENT
Start: 2025-05-20 | End: 2025-05-30

## 2025-05-20 RX ORDER — CIPROFLOXACIN 500 MG/1
500 TABLET, FILM COATED ORAL 2 TIMES DAILY
Qty: 20 TABLET | Refills: 0 | Status: SHIPPED | OUTPATIENT
Start: 2025-05-20 | End: 2025-05-30

## 2025-05-20 RX ORDER — HYDROMORPHONE HYDROCHLORIDE 2 MG/ML
0.5 INJECTION, SOLUTION INTRAMUSCULAR; INTRAVENOUS; SUBCUTANEOUS
Refills: 0 | Status: COMPLETED | OUTPATIENT
Start: 2025-05-20 | End: 2025-05-20

## 2025-05-20 RX ORDER — KETOROLAC TROMETHAMINE 30 MG/ML
15 INJECTION, SOLUTION INTRAMUSCULAR; INTRAVENOUS
Status: COMPLETED | OUTPATIENT
Start: 2025-05-20 | End: 2025-05-20

## 2025-05-20 RX ORDER — OXYCODONE AND ACETAMINOPHEN 5; 325 MG/1; MG/1
1 TABLET ORAL EVERY 6 HOURS PRN
Qty: 12 TABLET | Refills: 0 | Status: SHIPPED | OUTPATIENT
Start: 2025-05-20

## 2025-05-20 RX ORDER — OXYCODONE AND ACETAMINOPHEN 5; 325 MG/1; MG/1
1 TABLET ORAL
Refills: 0 | Status: COMPLETED | OUTPATIENT
Start: 2025-05-20 | End: 2025-05-20

## 2025-05-20 RX ORDER — METRONIDAZOLE 500 MG/1
500 TABLET ORAL
Status: COMPLETED | OUTPATIENT
Start: 2025-05-20 | End: 2025-05-20

## 2025-05-20 RX ORDER — ALUMINUM HYDROXIDE, MAGNESIUM HYDROXIDE, AND SIMETHICONE 1200; 120; 1200 MG/30ML; MG/30ML; MG/30ML
30 SUSPENSION ORAL ONCE
Status: COMPLETED | OUTPATIENT
Start: 2025-05-20 | End: 2025-05-20

## 2025-05-20 RX ORDER — ONDANSETRON 8 MG/1
8 TABLET, ORALLY DISINTEGRATING ORAL EVERY 8 HOURS PRN
Qty: 20 TABLET | Refills: 0 | Status: SHIPPED | OUTPATIENT
Start: 2025-05-20

## 2025-05-20 RX ORDER — LIDOCAINE HYDROCHLORIDE 20 MG/ML
15 SOLUTION OROPHARYNGEAL ONCE
Status: COMPLETED | OUTPATIENT
Start: 2025-05-20 | End: 2025-05-20

## 2025-05-20 RX ORDER — ONDANSETRON HYDROCHLORIDE 2 MG/ML
4 INJECTION, SOLUTION INTRAVENOUS
Status: COMPLETED | OUTPATIENT
Start: 2025-05-20 | End: 2025-05-20

## 2025-05-20 RX ADMIN — LIDOCAINE HYDROCHLORIDE 15 ML: 20 SOLUTION ORAL at 09:05

## 2025-05-20 RX ADMIN — KETOROLAC TROMETHAMINE 15 MG: 30 INJECTION, SOLUTION INTRAMUSCULAR; INTRAVENOUS at 10:05

## 2025-05-20 RX ADMIN — OXYCODONE HYDROCHLORIDE AND ACETAMINOPHEN 1 TABLET: 5; 325 TABLET ORAL at 10:05

## 2025-05-20 RX ADMIN — ALUMINUM HYDROXIDE, MAGNESIUM HYDROXIDE, AND SIMETHICONE 30 ML: 200; 200; 20 SUSPENSION ORAL at 09:05

## 2025-05-20 RX ADMIN — ONDANSETRON 4 MG: 2 INJECTION INTRAMUSCULAR; INTRAVENOUS at 09:05

## 2025-05-20 RX ADMIN — METRONIDAZOLE 500 MG: 500 TABLET ORAL at 10:05

## 2025-05-20 RX ADMIN — HYDROMORPHONE HYDROCHLORIDE 0.5 MG: 2 INJECTION, SOLUTION INTRAMUSCULAR; INTRAVENOUS; SUBCUTANEOUS at 09:05

## 2025-05-20 RX ADMIN — IOHEXOL 75 ML: 350 INJECTION, SOLUTION INTRAVENOUS at 09:05

## 2025-05-20 RX ADMIN — CIPROFLOXACIN 500 MG: 500 TABLET ORAL at 10:05

## 2025-05-20 RX ADMIN — SODIUM CHLORIDE 1000 ML: 0.9 INJECTION, SOLUTION INTRAVENOUS at 09:05

## 2025-05-20 RX ADMIN — POTASSIUM BICARBONATE 40 MEQ: 391 TABLET, EFFERVESCENT ORAL at 09:05

## 2025-05-20 NOTE — ED PROVIDER NOTES
"Encounter Date: 5/20/2025    SCRIBE #1 NOTE: I, Stephenie Garzon, am scribing for, and in the presence of,  Prashant Bermudez MD. I have scribed the following portions of the note - Other sections scribed: HPI, ROS, PE, MDM.       History     Chief Complaint   Patient presents with    Abdominal Pain     A 51 y/o female presents to the ER c/o diffused abdominal pain more profound near LUQ x 3 days.  Denies N/V/D  Pain is constant and dull.  +Constipation.  Last BM 4 days prior.  Pt took OTC medication without relief.     Brooke Schwartz is a 50 y.o. female, with a PMHx of HLD, GERD, who presents to the ED with a CC of diffuse abdominal pain onset X 3 days . Patient reports to ED with her  Turner. PT reports pain is exacerbated on the left side of her abdomen and exacerbates with P/O intake. She states "I can't eat or sleep". Pt also reports vomiting and nausea onset this morning. Pt reports her last bowel movement was 4 days ago, but states has been passing gas. Pt also reports she has a colonoscopy recently and it showed no reason for concern. No other exacerbating or alleviating factors. Denies fever, cough ,rhinorrhea, dysuria, frequency, diarrhea or other associated symptoms. Pt reports taking Xanax, and aripiprazole daily. Pt admits to vaping daily. Patient denies EtOH, tobacco, or illicit drug use.     The history is provided by the patient, medical records and a relative. No  was used.     Review of patient's allergies indicates:  No Known Allergies  Past Medical History:   Diagnosis Date    Addiction to drug     Alcohol abuse     Anorexia nervosa     Anxiety     Bipolar 1 disorder 11/27/2024    Depression     Encounter for medical screening examination     History of psychiatric hospitalization     Hx of psychiatric care     Celexa in her 20s for depression    Psychiatric problem     Sleep difficulties     Therapy     Withdrawal symptoms, alcohol      No past surgical history on " file.  Family History   Problem Relation Name Age of Onset    Depression Mother       Social History[1]  Review of Systems   Constitutional:  Negative for fever.   HENT:  Negative for rhinorrhea and sore throat.    Eyes:  Negative for visual disturbance.   Respiratory:  Negative for cough and shortness of breath.    Cardiovascular:  Negative for chest pain.   Gastrointestinal:  Positive for abdominal pain, nausea and vomiting. Negative for diarrhea.   Genitourinary:  Negative for difficulty urinating, dysuria and frequency.   Musculoskeletal:  Negative for back pain.   Skin:  Negative for rash.   Neurological:  Negative for headaches.       Physical Exam     Initial Vitals [05/20/25 0808]   BP Pulse Resp Temp SpO2   102/71 96 18 98.3 °F (36.8 °C) 100 %      MAP       --         Physical Exam    Nursing note and vitals reviewed.  Constitutional: She appears well-developed and well-nourished.   Eyes: EOM are normal. Pupils are equal, round, and reactive to light.   Neck: Neck supple. No thyromegaly present. No JVD present.   Normal range of motion.  Cardiovascular:  Normal rate, regular rhythm, normal heart sounds, intact distal pulses and normal pulses.     Exam reveals no gallop and no friction rub.       No murmur heard.  Pulmonary/Chest: No respiratory distress. She has no decreased breath sounds. She has no wheezes. She has no rhonchi. She has no rales.   Abdominal: Abdomen is soft. Bowel sounds are normal. There is abdominal tenderness in the left lower quadrant.   Musculoskeletal:         General: No tenderness or edema. Normal range of motion.      Cervical back: Normal range of motion and neck supple.     Neurological: She is alert and oriented to person, place, and time. She has normal strength.   Skin: Skin is warm and dry.         ED Course   Procedures  Labs Reviewed   POCT URINALYSIS W/O SCOPE - Abnormal       Result Value    Glucose, UA Negative      Bilirubin, UA 1+ (*)     Ketones, UA 2+ (*)     Spec  Grav UA >=1.030 (*)     Blood, UA Negative      PH, UA 5.0      Protein, UA Negative      Urobilinogen, UA 0.2      Nitrite, UA Negative      Leukocytes, UA Negative      Color, UA POC Yellow      Clarity, UA, POC Clear     POCT CMP - Abnormal    Albumin, POC 4.1      Alkaline Phosphatase, POC 53      ALT (SGPT), POC 19      AST (SGOT), POC 34      POC BUN 9      Calcium, POC 10.1      POC Chloride 106      POC Creatinine 0.5 (*)     POC Glucose 106      POC Potassium 4.6      POC Sodium 140      Bilirubin, POC 1.3      POC TCO2 28      Protein, POC 8.2 (*)    TROPONIN ISTAT    POC Cardiac Troponin I 0.00      Sample unknown     POCT CBC    Hematocrit        Hemoglobin        RBC        WBC        MCV        MCH, POC        MCHC        RDW-CV        Platelet Count, POC        MPV       POCT URINALYSIS W/O SCOPE   POCT CMP   POCT LIVER PANEL   POCT TROPONIN   POCT B-TYPE NATRIURETIC PEPTIDE (BNP)   POCT PROTIME-INR   ISTAT PROCEDURE    POC PTWBT 11.9      POC PTINR 1.2      Sample unknown     POCT LIVER PANEL    Albumin, POC 4.3      Alkaline Phosphatase, POC 54      ALT (SGPT), POC 18      Amylase, POC 54      AST (SGOT), POC 28      POC GGT 11      Bilirubin, POC 1.3      Protein, POC 8.0     POCT B-TYPE NATRIURETIC PEPTIDE (BNP)    POC B-Type Natriuretic Peptide 15.8       EKG Readings: (Independently Interpreted)   Initial Reading: No STEMI.   Normal sinus rhythm rate of 74, low voltage, nonspecific T-wave abnormalities.     ECG Results              EKG 12-lead (Final result)        Collection Time Result Time QRS Duration OHS QTC Calculation    05/20/25 08:59:14 05/20/25 17:10:42 64 417                     Final result by Interface, Lab In [facility] (05/20/25 17:10:47)                   Narrative:    Test Reason : R10.9,    Vent. Rate :  74 BPM     Atrial Rate :  74 BPM     P-R Int : 124 ms          QRS Dur :  64 ms      QT Int : 376 ms       P-R-T Axes :  53  53  87 degrees    QTcB Int : 417 ms    Normal sinus  rhythm  Low voltage QRS  Nonspecific T wave abnormality  Abnormal ECG  When compared with ECG of 21-Nov-2024 15:35,  Nonspecific T wave abnormality, worse in Lateral leads  Confirmed by Stevie Vasques ([id number]) on 5/20/2025 5:10:39 PM    Referred By:            Confirmed By: Stevie Vasques                                  Imaging Results              CT Abdomen Pelvis With IV Contrast NO Oral Contrast (Final result)  Result time 05/20/25 09:53:19      Final result by Turner Moreno Jr., MD (05/20/25 09:53:19)                   Impression:      Abnormal appearance of the rectosigmoid colon suggesting infectious or inflammatory proctocolitis.      Electronically signed by: Turner Ortiz Jr  Date:    05/20/2025  Time:    09:53               Narrative:    EXAMINATION:  CT ABDOMEN PELVIS WITH IV CONTRAST    CLINICAL HISTORY:  LLQ abdominal pain;    TECHNIQUE:  Low dose axial images, sagittal and coronal reformations were obtained from the lung bases to the pubic symphysis following the IV administration of 75 mL of Omnipaque 350    COMPARISON:  None.    FINDINGS:  Image quality degraded by motion artifact.    Abdomen:    - Lung bases: Clear.    - Liver: Normal.    - Gallbladder and bile ducts: Unremarkable.    - Spleen: Unremarkable.    - Pancreas: Normal.    - Kidneys: No mass or hydronephrosis.    - Adrenals: Unremarkable.    - Retroperitoneum:  No significant adenopathy.    - Vascular: Aortic atherosclerosis.    - Bowel/mesentery: There is wall thickening of the rectosigmoid colon with some effacement of the sigmoid haustra markings.  The remainder of the large and small bowel loops have a benign appearance.    Pelvis:    No pelvic mass, adenopathy, or free fluid.    Bones:  Unremarkable.                                       Medications   sodium chloride 0.9% bolus 1,000 mL 1,000 mL (0 mLs Intravenous Stopped 5/20/25 1011)   ondansetron injection 4 mg (4 mg Intravenous Given 5/20/25 0905)    aluminum-magnesium hydroxide-simethicone 200-200-20 mg/5 mL suspension 30 mL (30 mLs Oral Given 5/20/25 0905)     And   LIDOcaine viscous HCl 2% oral solution 15 mL (15 mLs Oral Given 5/20/25 0907)   HYDROmorphone (PF) injection 0.5 mg (0.5 mg Intramuscular Given 5/20/25 0905)   potassium bicarbonate disintegrating tablet 40 mEq (40 mEq Oral Given 5/20/25 0906)   iohexoL (OMNIPAQUE 350) injection 75 mL (75 mLs Intravenous Given 5/20/25 0942)   ciprofloxacin HCl tablet 500 mg (500 mg Oral Given 5/20/25 1012)   metroNIDAZOLE tablet 500 mg (500 mg Oral Given 5/20/25 1012)   ketorolac injection 15 mg (15 mg Intramuscular Given 5/20/25 1011)   oxyCODONE-acetaminophen 5-325 mg per tablet 1 tablet (1 tablet Oral Given 5/20/25 1012)     Medical Decision Making  This is an emergent evaluation of a 50 y.o. female who presents with to the ED with a CC of diffuse abdominal pain onset X 3 days . The patient was seen and examined. The history and physical exam was obtained. The nursing notes and vital signs were reviewed. Secondary to symptoms and examination findings, I ordered POCT B-TYPE natriuretic peptide, troponin ISAT,  ISTAT procedure, POCT CMP, POCT liver panel, POCT urinalysis w/o scope, POCT  protime-INR, POCT CMP, POCT CBC, and CT abdomen pelvis. Will treat IVFS, Pain and nausea as needed.      Amount and/or Complexity of Data Reviewed  External Data Reviewed: notes.     Details: See HPI  Labs: ordered. Decision-making details documented in ED Course.  Radiology: ordered. Decision-making details documented in ED Course.    Risk  OTC drugs.  Prescription drug management.    Patient presents complaining of abdominal pain.  Workup shows sigmoid colitis/proctitis.  Will treat with Cipro Flagyl.  Will treat with Proctofoam.  Will refer to GI for colonoscopy.  Patient tolerating p.o..  Vital signs are stable.  She appears comfortable.  Stable for discharge.  Return for fever or new or worsening symptoms.        Scribe  Attestation:   Scribe #1: I performed the above scribed service and the documentation accurately describes the services I performed. I attest to the accuracy of the note.                               Clinical Impression:  Final diagnoses:  [K62.89] Proctitis  [K52.9] Colitis (Primary)  [R10.32] LLQ abdominal pain     I, Prashant Bermudez, personally performed the services described in this documentation. All medical record entries made by the scribe were at my direction and in my presence. I have reviewed the chart and agree that the record reflects my personal performance and is accurate and complete.      DISCLAIMER: This note was prepared with "[employer]" voice recognition transcription software. Garbled syntax, mangled pronouns, and other bizarre constructions may be attributed to that software system.      ED Disposition Condition    Discharge Stable          ED Prescriptions       Medication Sig Dispense Start Date End Date Auth. Provider    ciprofloxacin HCl (CIPRO) 500 MG tablet Take 1 tablet (500 mg total) by mouth 2 (two) times daily. for 10 days 20 tablet 5/20/2025 5/30/2025 Prashant Bermudez MD    metroNIDAZOLE (FLAGYL) 500 MG tablet Take 1 tablet (500 mg total) by mouth 3 (three) times daily. for 10 days 30 tablet 5/20/2025 5/30/2025 Prashant Bermudez MD    hydrocortisone-pramoxine (PROCTOFOAM-HS) rectal foam Place 1 applicator rectally 2 (two) times daily. for 7 days 14 applicator 5/20/2025 5/27/2025 Prashant Bermudez MD    oxyCODONE-acetaminophen (PERCOCET) 5-325 mg per tablet Take 1 tablet by mouth every 6 (six) hours as needed for Pain. 12 tablet 5/20/2025 -- Prashant Bermudez MD    ondansetron (ZOFRAN-ODT) 8 MG TbDL Take 1 tablet (8 mg total) by mouth every 8 (eight) hours as needed (Nausea). 20 tablet 5/20/2025 -- Prashant Bermudez MD          Follow-up Information       Follow up With Specialties Details Why Contact Info    Lefty Solis MD General Practice, Internal  Medicine   [address]  [address]  [facility] [address]  [phone]      Joanna Martinez MD Gastroenterology Schedule an appointment as soon as possible for a visit  for GI follow up. [address]  [address]  [facility] [address]  [phone]                     [1]   Social History  Tobacco Use    Smoking status: Former     Current packs/day: 0.00     Average packs/day: 1 pack/day for 10.0 years (10.0 ttl pk-yrs)     Types: Cigarettes, Vaping w/o nicotine     Start date: 2009     Quit date: 2019     Years since quittin.3    Smokeless tobacco: Current    Tobacco comments:     Currently vaping   Substance Use Topics    Alcohol use: Yes     Alcohol/week: 4.0 standard drinks of alcohol     Types: 4 Glasses of wine per week     Comment: drinking half pint to a pint of [employer] whiskey or vodka daily    Drug use: Yes     Types: Marijuana     Comment: Unknown last use        Prashant Bermudez MD  25 [id number]

## 2025-05-30 ENCOUNTER — HOSPITAL ENCOUNTER (EMERGENCY)
Facility: HOSPITAL | Age: 50
Discharge: HOME | End: 2025-05-30
Attending: STUDENT IN AN ORGANIZED HEALTH CARE EDUCATION/TRAINING PROGRAM
Payer: COMMERCIAL

## 2025-05-30 VITALS
DIASTOLIC BLOOD PRESSURE: 78 MMHG | RESPIRATION RATE: 18 BRPM | WEIGHT: 115 LBS | OXYGEN SATURATION: 100 % | TEMPERATURE: 98 F | HEIGHT: 62 IN | BODY MASS INDEX: 21.16 KG/M2 | HEART RATE: 73 BPM | SYSTOLIC BLOOD PRESSURE: 113 MMHG

## 2025-05-30 DIAGNOSIS — F31.9 BIPOLAR AFFECTIVE DISORDER, REMISSION STATUS UNSPECIFIED: ICD-10-CM

## 2025-05-30 DIAGNOSIS — F41.9 ANXIETY: ICD-10-CM

## 2025-05-30 DIAGNOSIS — F30.8 HYPOMANIA: Primary | ICD-10-CM

## 2025-05-30 PROCEDURE — 96372 THER/PROPH/DIAG INJ SC/IM: CPT | Performed by: STUDENT IN AN ORGANIZED HEALTH CARE EDUCATION/TRAINING PROGRAM

## 2025-05-30 PROCEDURE — 63600175 PHARM REV CODE 636 W HCPCS: Mod: ER | Performed by: STUDENT IN AN ORGANIZED HEALTH CARE EDUCATION/TRAINING PROGRAM

## 2025-05-30 PROCEDURE — 99284 EMERGENCY DEPT VISIT MOD MDM: CPT | Mod: 25,ER

## 2025-05-30 PROCEDURE — 25000003 PHARM REV CODE 250: Mod: ER | Performed by: STUDENT IN AN ORGANIZED HEALTH CARE EDUCATION/TRAINING PROGRAM

## 2025-05-30 RX ORDER — OLANZAPINE 5 MG/1
10 TABLET, ORALLY DISINTEGRATING ORAL
Status: COMPLETED | OUTPATIENT
Start: 2025-05-30 | End: 2025-05-30

## 2025-05-30 RX ADMIN — OLANZAPINE 10 MG: 5 TABLET, ORALLY DISINTEGRATING ORAL at 01:05

## 2025-05-30 RX ADMIN — LORAZEPAM 1 MG: 2 INJECTION INTRAMUSCULAR; INTRAVENOUS at 01:05
